# Patient Record
Sex: MALE | Race: WHITE | NOT HISPANIC OR LATINO | URBAN - METROPOLITAN AREA
[De-identification: names, ages, dates, MRNs, and addresses within clinical notes are randomized per-mention and may not be internally consistent; named-entity substitution may affect disease eponyms.]

---

## 2018-05-08 ENCOUNTER — OFFICE VISIT CONVERTED (OUTPATIENT)
Dept: SURGERY | Facility: CLINIC | Age: 58
End: 2018-05-08
Attending: UROLOGY

## 2018-09-19 ENCOUNTER — OFFICE VISIT CONVERTED (OUTPATIENT)
Dept: GASTROENTEROLOGY | Facility: CLINIC | Age: 58
End: 2018-09-19
Attending: INTERNAL MEDICINE

## 2019-02-14 ENCOUNTER — OFFICE VISIT CONVERTED (OUTPATIENT)
Dept: FAMILY MEDICINE CLINIC | Facility: CLINIC | Age: 59
End: 2019-02-14
Attending: FAMILY MEDICINE

## 2019-02-14 ENCOUNTER — CONVERSION ENCOUNTER (OUTPATIENT)
Dept: FAMILY MEDICINE CLINIC | Facility: CLINIC | Age: 59
End: 2019-02-14

## 2019-05-14 ENCOUNTER — OFFICE VISIT CONVERTED (OUTPATIENT)
Dept: FAMILY MEDICINE CLINIC | Facility: CLINIC | Age: 59
End: 2019-05-14
Attending: FAMILY MEDICINE

## 2019-11-14 ENCOUNTER — CONVERSION ENCOUNTER (OUTPATIENT)
Dept: FAMILY MEDICINE CLINIC | Facility: CLINIC | Age: 59
End: 2019-11-14

## 2019-11-14 ENCOUNTER — OFFICE VISIT CONVERTED (OUTPATIENT)
Dept: FAMILY MEDICINE CLINIC | Facility: CLINIC | Age: 59
End: 2019-11-14
Attending: FAMILY MEDICINE

## 2020-05-14 ENCOUNTER — TELEMEDICINE CONVERTED (OUTPATIENT)
Dept: FAMILY MEDICINE CLINIC | Facility: CLINIC | Age: 60
End: 2020-05-14
Attending: FAMILY MEDICINE

## 2020-06-15 ENCOUNTER — HOSPITAL ENCOUNTER (OUTPATIENT)
Dept: OTHER | Facility: HOSPITAL | Age: 60
Discharge: HOME | End: 2020-06-15
Attending: INTERNAL MEDICINE

## 2020-06-15 LAB
ANION GAP SERPL CALC-SCNC: 15 MMOL/L (ref 8–19)
BASOPHILS # BLD AUTO: 0.02 10*3/UL (ref 0–0.2)
BASOPHILS NFR BLD AUTO: 0.3 % (ref 0–3)
BUN SERPL-MCNC: 12 MG/DL (ref 5–25)
BUN/CREAT SERPL: 9 {RATIO} (ref 6–20)
CALCIUM SERPL-MCNC: 9.7 MG/DL (ref 8.7–10.4)
CHLORIDE SERPL-SCNC: 99 MMOL/L (ref 99–111)
CONV ABS IMM GRAN: 0.02 10*3/UL (ref 0–0.2)
CONV CO2: 29 MMOL/L (ref 22–32)
CONV IMMATURE GRAN: 0.3 % (ref 0–1.8)
CREAT UR-MCNC: 1.32 MG/DL (ref 0.7–1.2)
DEPRECATED RDW RBC AUTO: 42.1 FL (ref 35.1–43.9)
EOSINOPHIL # BLD AUTO: 0.22 10*3/UL (ref 0–0.7)
EOSINOPHIL # BLD AUTO: 3.7 % (ref 0–7)
ERYTHROCYTE [DISTWIDTH] IN BLOOD BY AUTOMATED COUNT: 12.8 % (ref 11.6–14.4)
GFR SERPLBLD BASED ON 1.73 SQ M-ARVRAT: 57 ML/MIN/{1.73_M2}
GLUCOSE SERPL-MCNC: 94 MG/DL (ref 70–99)
HCT VFR BLD AUTO: 42.3 % (ref 42–52)
HGB BLD-MCNC: 13.7 G/DL (ref 14–18)
INR PPP: 0.98 (ref 2–3)
LYMPHOCYTES # BLD AUTO: 2.62 10*3/UL (ref 1–5)
LYMPHOCYTES NFR BLD AUTO: 44.3 % (ref 20–45)
MCH RBC QN AUTO: 29.1 PG (ref 27–31)
MCHC RBC AUTO-ENTMCNC: 32.4 G/DL (ref 33–37)
MCV RBC AUTO: 89.8 FL (ref 80–96)
MONOCYTES # BLD AUTO: 0.5 10*3/UL (ref 0.2–1.2)
MONOCYTES NFR BLD AUTO: 8.5 % (ref 3–10)
NEUTROPHILS # BLD AUTO: 2.53 10*3/UL (ref 2–8)
NEUTROPHILS NFR BLD AUTO: 42.9 % (ref 30–85)
NRBC CBCN: 0 % (ref 0–0.7)
OSMOLALITY SERPL CALC.SUM OF ELEC: 290 MOSM/KG (ref 273–304)
PLATELET # BLD AUTO: 251 10*3/UL (ref 130–400)
PMV BLD AUTO: 9.3 FL (ref 9.4–12.4)
POTASSIUM SERPL-SCNC: 3.4 MMOL/L (ref 3.5–5.3)
PROTHROMBIN TIME: 10.6 S (ref 9.4–12)
RBC # BLD AUTO: 4.71 10*6/UL (ref 4.7–6.1)
SODIUM SERPL-SCNC: 140 MMOL/L (ref 135–147)
WBC # BLD AUTO: 5.91 10*3/UL (ref 4.8–10.8)

## 2020-06-16 LAB — SARS-COV-2 RNA SPEC QL NAA+PROBE: NOT DETECTED

## 2020-06-17 ENCOUNTER — HOSPITAL ENCOUNTER (OUTPATIENT)
Dept: INFUSION THERAPY | Facility: HOSPITAL | Age: 60
Discharge: HOME | End: 2020-06-17
Attending: INTERNAL MEDICINE

## 2020-11-12 ENCOUNTER — OFFICE VISIT CONVERTED (OUTPATIENT)
Dept: FAMILY MEDICINE CLINIC | Facility: CLINIC | Age: 60
End: 2020-11-12
Attending: FAMILY MEDICINE

## 2021-04-06 ENCOUNTER — OFFICE VISIT CONVERTED (OUTPATIENT)
Dept: FAMILY MEDICINE CLINIC | Facility: CLINIC | Age: 61
End: 2021-04-06
Attending: FAMILY MEDICINE

## 2021-05-13 NOTE — PROGRESS NOTES
Progress Note      Patient Name: Rico Armstrong   Patient ID: [id number]   Sex: Male   YOB: 1960    Primary Care Provider: Minh Shay DO   Referring Provider: Minh Shay DO    Visit Date: November 12, 2020    Provider: Minh Shay DO   Location: [facility]   Location Address: [address] [place] [address], [address]  [id number]   Location Phone: [phone]          Chief Complaint  · 6 month follow up       History Of Present Illness  Rico Armstrong is a 60 year old /White male who presents for evaluation and treatment of:      Patient presents today for 6-month follow-up.  Last time I saw him was back in May.  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  He will no longer be seeing Dr. Luz Marina Pineda as well as Dr. Jl Escobar.  He will continue to see Dr. Teresita Mak for Botox injections for muscle spasms on the left side of his neck.  His potassium level was previously checked and noted to be low.  I suspect it is due to hydrochlorothiazide.  He did take potassium supplementation, 10 mEq for 2 months.  He is no longer taking it now.  We will need to recheck his potassium as well as his lipid panel.  I will see him back in 6 months.       Past Medical History  Allergies; HLD (hyperlipidemia); HTN (hypertension); Hypothyroid; Need for hepatitis C screening test; CHERRY (obstructive sleep apnea); Prostate cancer screening; Reflux; Throat cancer; Tonsil cancer         Past Surgical History  Appendectomy; Cartilage graft, autogenous, to nose from rib; Colonoscopy; Feeding gastrostomy; Knee surgery; Port Placement; Skin Cancer Excision         Medication List  aspirin 81 mg oral tablet,chewable; cholecalciferol (vitamin D3) 1,000 unit oral capsule; esomeprazole magnesium 40 mg oral capsule,delayed release(DR/EC); fexofenadine 180 mg oral tablet; Fish Oil 1,000 mg (120 mg-180 mg) oral capsule;  "hydrochlorothiazide 25 mg oral tablet; Multivitamin 50 Plus oral tablet; potassium chloride 10 mEq oral tablet extended release; Prevident 5000 Enamel Protect 1.1-5 % dental paste; Synthroid 75 mcg oral tablet; Vitamin B-12 oral; Zocor 20 mg oral tablet         Allergy List  NO KNOWN DRUG ALLERGIES       Allergies Reconciled  Family Medical History  Stroke; Alzheimer's Disease; Heart Disease; Diabetes; No family history of colorectal cancer         Social History  Alcohol (Former); Tobacco (Former)         Immunizations  Name Date Admin   Influenza 11/14/2019         Review of Systems     Gen: Denies any fever, chills, or weight changes  HEENT: Denies any changes in vision or hearing, denies nasal congestion, denies any neck tenderness or lymphadenopathy  Extremities: Denies edema  Psychiatric: Denies any changes in mood or affect  Neurologic: Denies any deficits  Skin: Denies any rashes       Vitals  Date Time BP Position Site L\R Cuff Size HR RR TEMP (F) WT  HT  BMI kg/m2 BSA m2 O2 Sat FR L/min FiO2 HC       11/12/2020 08:54 /78 Sitting    82 - R  98.2 205lbs 4oz 5'  9\" 30.31 2.13 95 %            Physical Examination     General: AAO 3, no acute distress, pleasant  HEENT: Normocephalic, atraumatic  Cardiovascular: Regular rate and rhythm without appreciable murmur  Respiratory: Clear to auscultation bilaterally no RRW  Gastrointestinal: Soft nontender nondistended with bowel sounds present  extremities: No clubbing, cyanosis or edema  Neurologic: CN II through XII grossly intact   Psychiatric: Normal mood and affect           Assessment  · Hypertension     401.9/I10  · Medication monitoring encounter     V58.83/Z51.81  · Hypokalemia     276.8/E87.6  · Tonsillar cancer     146.0/C09.9  · Hypothyroid     244.9/E03.9  · HLD (hyperlipidemia)     272.4/E78.5  Patient overall doing well. Plan to repeat his labs. I will see him back in 6 months or sooner if needed. Patient will call with any questions or " concerns.      Plan  · Orders  o Lipid Panel [facility] ([address]) - 272.4/E78.5 - 11/12/2020  o ACO-39: Current medications updated and reviewed (, 1159F) - - 11/12/2020  o ACO-14: Influenza immunization administered or previously received [facility] () - - 11/12/2020  · Medications  o Medications have been Reconciled  o Transition of Care or Provider Policy  · Instructions  o Patient was educated/instructed on their diagnosis, treatment and medications prior to discharge from the clinic today.  o Patient instructed to seek medical attention urgently for new or worsening symptoms.  o Call the office with any concerns or questions.  · Disposition  o Follow Up in 6 months.            Electronically Signed by: Minh Shay DO - on November 12, 2020 09:29:32 AM

## 2021-05-13 NOTE — PROGRESS NOTES
Quick Note      Patient Name: Rico Armstrong   Patient ID: [id number]   Sex: Male   YOB: 1960    Primary Care Provider: Minh Shay DO    Visit Date: May 14, 2020    Provider: Minh Shay DO   Location: [facility]   Location Address: [address] [place] [address], [address] [place] [address]  [id number]   Location Phone: [phone]          History Of Present Illness  Video Conferencing Visit  Rico Armstrong is a 59 year old /White male who is presenting for evaluation via video conferencing. Verbal consent obtained before beginning visit.   The following staff were present during this visit: provider only      Patient presents for a video conference visit today.  This is done through the [employer] don[employer].  He is accompanied by his wife who is listening in on the conversation.  Patient provides verbal consent.  I spent 15 minutes on medical discussion with patient.  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  There is no need for any more follow-up.  He was seeing Dr. Luz Marina Pineda as well as Dr. Jl Escobar.  He will continue to see Dr. Teresita Mak for Botox injections for muscle spasms on the left side of his neck.  He reports feeling well.  He has not had recent labs done since I last saw him in November.  He has had some issues with hypokalemia however his last potassium check was within normal limits back in November.  I suspect the hypokalemia is caused by hydrochlorothiazide.  He is not requiring supplementation at this time.  Discussed having patient come in and  a copy of the lab orders.  He is requesting to get them done on [place].  He will bring in a copy of the labs for my review.  His LDL is elevated at 139.  He reports being more compliant with Zocor now.  He reports overall feeling well.  He did have scans recently done at [facility] for the [facility]  cancer center.  I will request these.  He is not requesting any medications to be refilled at this time.       Physical Examination     General appearance: AAO x3, pleasant, no acute distress  HEENT: Normocephalic, atraumatic  Respiratory: No dyspnea/respiratory distress           Assessment  · Muscle spasm     728.85/M62.838  · Tonsillar cancer     146.0/C09.9  · HLD (hyperlipidemia)     272.4/E78.5  · Hypertension     401.9/I10  · Hypokalemia     276.8/E87.6  · Hypothyroid     244.9/E03.9  · Medication monitoring encounter     V58.83/Z51.81    Problems Reconciled  Plan  · Orders  o CBC with Auto Diff [facility] ([address]) - 401.9/I10, V58.83/Z51.81 - 05/14/2020  o Thyroid Profile (TSH, FT4) (35366, 43508, THYII) - 401.9/I10, 244.9/E03.9, V58.83/Z51.81 - 05/14/2020  o CMP [facility] (51943) - 401.9/I10, V58.83/Z51.81 - 05/14/2020  o Lipid Panel [facility] ([id number]) - 272.4/E78.5 - 05/14/2020  · Instructions  o Patient overall doing well. Discussed getting routine labs and seeing him back in 6 months. I will request records from [facility] cancer center. Patient to call with any questions or concerns.   · Disposition  o Follow Up in 6 months.            Electronically Signed by: Minh Shay DO - on May 14, 2020 11:01:29 AM

## 2021-05-14 VITALS
HEIGHT: 69 IN | OXYGEN SATURATION: 95 % | DIASTOLIC BLOOD PRESSURE: 78 MMHG | HEART RATE: 82 BPM | WEIGHT: 205.25 LBS | TEMPERATURE: 98.2 F | SYSTOLIC BLOOD PRESSURE: 122 MMHG | BODY MASS INDEX: 30.4 KG/M2

## 2021-05-14 VITALS
OXYGEN SATURATION: 97 % | WEIGHT: 208.31 LBS | SYSTOLIC BLOOD PRESSURE: 158 MMHG | HEART RATE: 78 BPM | DIASTOLIC BLOOD PRESSURE: 88 MMHG | HEIGHT: 69 IN | TEMPERATURE: 97.9 F | BODY MASS INDEX: 30.85 KG/M2

## 2021-05-14 NOTE — PROGRESS NOTES
Progress Note      Patient Name: Rico Armstrong   Patient ID: [id number]   Sex: Male   YOB: 1960    Primary Care Provider: Minh Shay DO   Referring Provider: Minh Shay DO    Visit Date: April 6, 2021    Provider: Minh Shay DO   Location: [facility]   Location Address: [address] [place] [address], [address]  [place], [place]  [id number]   Location Phone: [phone]          Chief Complaint  · requesting a letter      History Of Present Illness  Rico Armstrong is a 60 year old /White male who presents for evaluation and treatment of:      Presents today requesting exemption from jury duty.  He has had tonsillar cancer.  This was in 2015.  He is status post chemo and radiation.  Unfortunately due to this condition he has been left with issues regarding dry mouth and dry throat.  He does drink liquids all the time.  He will not be allowed to do this likely while on the jury stand.  He is requesting a letter today for exemption which I find to be reasonable.  He will also have frequent bathroom breaks which he does urinate frequently due to all the liquids he takes and throughout the day.       Past Medical History  Allergies; HLD (hyperlipidemia); HTN (hypertension); Hypothyroid; Need for hepatitis C screening test; CHERRY (obstructive sleep apnea); Prostate cancer screening; Reflux; Throat cancer; Tonsil cancer         Past Surgical History  Appendectomy; Cartilage graft, autogenous, to nose from rib; Colonoscopy; Feeding gastrostomy; Knee surgery; Port Placement; Skin Cancer Excision         Medication List  aspirin 81 mg oral tablet,chewable; cholecalciferol (vitamin D3) 1,000 unit oral capsule; esomeprazole magnesium 40 mg oral capsule,delayed release(DR/EC); fexofenadine 180 mg oral tablet; Fish Oil 1,000 mg (120 mg-180 mg) oral capsule; hydrochlorothiazide 25 mg oral tablet; Multivitamin 50 Plus oral tablet; potassium chloride 10 mEq oral tablet extended release;  "Prevident 5000 Enamel Protect 1.1-5 % dental paste; Synthroid 75 mcg oral tablet; Vitamin B-12 oral; Zocor 20 mg oral tablet         Allergy List  NO KNOWN DRUG ALLERGIES       Allergies Reconciled  Family Medical History  Stroke; Alzheimer's Disease; Heart Disease; Diabetes; No family history of colorectal cancer         Social History  Alcohol (Former); Tobacco (Former)         Immunizations  Name Date Admin   COVID Moderna 04/05/2021   COVID Moderna 03/08/2021   Influenza 09/07/2020   Influenza 11/14/2019         Review of Systems     Gen: Denies any fever, chills  HEENT: As discussed above       Vitals  Date Time BP Position Site L\R Cuff Size HR RR TEMP (F) WT  HT  BMI kg/m2 BSA m2 O2 Sat FR L/min FiO2 HC       04/06/2021 01:07 /88 Sitting    78 - R  97.9 208lbs 5oz 5'  9\" 30.76 2.14 97 %            Physical Examination     General: AAO 3, no acute distress, pleasant  HEENT: Normocephalic, atraumatic             Assessment  · Tonsillar cancer     146.0/C09.9      Plan  · Orders  o ACO-14: Influenza immunization administered or previously received [facility] () - - 04/06/2021  o ACO-39: Current medications updated and reviewed (1159F, ) - - 04/06/2021  · Instructions  o Patient was educated/instructed on their diagnosis, treatment and medications prior to discharge from the clinic today.  o Patient instructed to seek medical attention urgently for new or worsening symptoms.  o Call the office with any concerns or questions.  o Plan as documented above. Patient instructed to call with any questions or concerns. Plan on seeing him back for his next regular scheduled appointment or sooner if needed.  · Disposition  o Keep next appointment            Electronically Signed by: Minh Shay DO -Author on April 6, 2021 01:25:23 PM  "

## 2021-05-15 VITALS
DIASTOLIC BLOOD PRESSURE: 74 MMHG | BODY MASS INDEX: 31.03 KG/M2 | HEIGHT: 69 IN | SYSTOLIC BLOOD PRESSURE: 124 MMHG | HEART RATE: 77 BPM | TEMPERATURE: 98.2 F | OXYGEN SATURATION: 96 % | WEIGHT: 209.5 LBS

## 2021-05-15 VITALS
HEART RATE: 70 BPM | BODY MASS INDEX: 29.67 KG/M2 | HEIGHT: 69 IN | TEMPERATURE: 98.2 F | SYSTOLIC BLOOD PRESSURE: 140 MMHG | OXYGEN SATURATION: 96 % | DIASTOLIC BLOOD PRESSURE: 76 MMHG | WEIGHT: 200.31 LBS

## 2021-05-15 VITALS
OXYGEN SATURATION: 96 % | BODY MASS INDEX: 29.33 KG/M2 | WEIGHT: 198 LBS | DIASTOLIC BLOOD PRESSURE: 70 MMHG | SYSTOLIC BLOOD PRESSURE: 130 MMHG | HEART RATE: 69 BPM | TEMPERATURE: 97.8 F | HEIGHT: 69 IN

## 2021-05-16 VITALS — BODY MASS INDEX: 29.62 KG/M2 | RESPIRATION RATE: 16 BRPM | HEIGHT: 69 IN | WEIGHT: 200 LBS

## 2021-05-16 VITALS
DIASTOLIC BLOOD PRESSURE: 83 MMHG | WEIGHT: 200 LBS | HEIGHT: 69 IN | SYSTOLIC BLOOD PRESSURE: 154 MMHG | BODY MASS INDEX: 29.62 KG/M2

## 2021-06-22 ENCOUNTER — TELEPHONE (OUTPATIENT)
Dept: FAMILY MEDICINE CLINIC | Facility: CLINIC | Age: 61
End: 2021-06-22

## 2021-06-22 RX ORDER — FLUTICASONE PROPIONATE 50 MCG
2 SPRAY, SUSPENSION (ML) NASAL DAILY
Qty: 16 G | Refills: 3 | Status: SHIPPED | OUTPATIENT
Start: 2021-06-22

## 2021-06-22 NOTE — TELEPHONE ENCOUNTER
Please contact patient.  I am not sure what the request is here is that states new medication but I do not see medication listed.  Please inform patient that prescription has been sent to [facility].  If he continues to have issues please have him make an appointment to be seen.

## 2021-06-22 NOTE — TELEPHONE ENCOUNTER
Caller: Rico Armstrong    Relationship: Self    Best call back number:[phone]    What medication are you requesting: FLONASE IS HAVING BAD ALLERGIES THIS YEAR         If a prescription is needed, what is your preferred pharmacy and phone number:  [facility]

## 2021-08-17 ENCOUNTER — OFFICE VISIT (OUTPATIENT)
Dept: FAMILY MEDICINE CLINIC | Facility: CLINIC | Age: 61
End: 2021-08-17

## 2021-08-17 VITALS
WEIGHT: 198 LBS | SYSTOLIC BLOOD PRESSURE: 126 MMHG | RESPIRATION RATE: 18 BRPM | TEMPERATURE: 98 F | HEART RATE: 63 BPM | BODY MASS INDEX: 29.33 KG/M2 | HEIGHT: 69 IN | DIASTOLIC BLOOD PRESSURE: 76 MMHG | OXYGEN SATURATION: 97 %

## 2021-08-17 DIAGNOSIS — Z85.828 PERSONAL HISTORY OF SKIN CANCER: ICD-10-CM

## 2021-08-17 DIAGNOSIS — Z12.5 SCREENING FOR PROSTATE CANCER: ICD-10-CM

## 2021-08-17 DIAGNOSIS — K21.9 GASTROESOPHAGEAL REFLUX DISEASE, UNSPECIFIED WHETHER ESOPHAGITIS PRESENT: ICD-10-CM

## 2021-08-17 DIAGNOSIS — Z51.81 MEDICATION MONITORING ENCOUNTER: ICD-10-CM

## 2021-08-17 DIAGNOSIS — I10 ESSENTIAL HYPERTENSION: Primary | ICD-10-CM

## 2021-08-17 DIAGNOSIS — C44.90 SKIN CANCER: ICD-10-CM

## 2021-08-17 DIAGNOSIS — C09.9 TONSILLAR CANCER (HCC): ICD-10-CM

## 2021-08-17 DIAGNOSIS — E03.9 HYPOTHYROIDISM, UNSPECIFIED TYPE: ICD-10-CM

## 2021-08-17 DIAGNOSIS — E55.9 VITAMIN D DEFICIENCY: ICD-10-CM

## 2021-08-17 DIAGNOSIS — M62.838 MUSCLE SPASMS OF NECK: ICD-10-CM

## 2021-08-17 PROBLEM — I47.1 PAROXYSMAL SUPRAVENTRICULAR TACHYCARDIA: Status: ACTIVE | Noted: 2018-10-05

## 2021-08-17 PROBLEM — I47.10 PAROXYSMAL SUPRAVENTRICULAR TACHYCARDIA: Status: ACTIVE | Noted: 2018-10-05

## 2021-08-17 PROBLEM — Z12.11 COLON CANCER SCREENING: Status: ACTIVE | Noted: 2021-08-17

## 2021-08-17 PROBLEM — J01.80 OTHER ACUTE SINUSITIS: Status: ACTIVE | Noted: 2021-08-17

## 2021-08-17 PROBLEM — C76.0 MALIGNANT NEOPLASM OF EAR, NOSE, AND THROAT: Status: ACTIVE | Noted: 2021-08-17

## 2021-08-17 PROBLEM — C44.201 MALIGNANT NEOPLASM OF EAR, NOSE, AND THROAT: Status: ACTIVE | Noted: 2021-08-17

## 2021-08-17 PROBLEM — R00.2 PALPITATIONS: Status: ACTIVE | Noted: 2018-10-05

## 2021-08-17 PROBLEM — Z11.59 NEED FOR HEPATITIS C SCREENING TEST: Status: ACTIVE | Noted: 2021-08-17

## 2021-08-17 PROBLEM — E78.5 HLD (HYPERLIPIDEMIA): Status: ACTIVE | Noted: 2021-08-17

## 2021-08-17 PROBLEM — G47.33 OSA (OBSTRUCTIVE SLEEP APNEA): Status: ACTIVE | Noted: 2019-02-14

## 2021-08-17 PROBLEM — C14.0 MALIGNANT NEOPLASM OF EAR, NOSE, AND THROAT: Status: ACTIVE | Noted: 2021-08-17

## 2021-08-17 PROBLEM — R00.0 TACHYCARDIA: Status: ACTIVE | Noted: 2018-10-05

## 2021-08-17 PROCEDURE — 99214 OFFICE O/P EST MOD 30 MIN: CPT | Performed by: FAMILY MEDICINE

## 2021-08-17 RX ORDER — FEXOFENADINE HCL 180 MG/1
TABLET ORAL
COMMUNITY
End: 2021-08-17

## 2021-08-17 RX ORDER — SIMVASTATIN 40 MG
TABLET ORAL
COMMUNITY
Start: 2021-07-03

## 2021-08-17 RX ORDER — SIMVASTATIN 20 MG
TABLET ORAL
COMMUNITY
End: 2021-08-17

## 2021-08-17 RX ORDER — ASPIRIN 81 MG/1
TABLET, COATED ORAL
COMMUNITY
Start: 2021-07-26

## 2021-08-17 RX ORDER — SIMVASTATIN 10 MG
TABLET ORAL
COMMUNITY
End: 2021-08-17

## 2021-08-17 RX ORDER — ERGOCALCIFEROL 1.25 MG/1
CAPSULE ORAL
COMMUNITY
End: 2021-08-17

## 2021-08-17 RX ORDER — LEVOTHYROXINE SODIUM 0.07 MG/1
TABLET ORAL
COMMUNITY

## 2021-08-17 RX ORDER — ESOMEPRAZOLE MAGNESIUM 40 MG/1
CAPSULE, DELAYED RELEASE ORAL
COMMUNITY

## 2021-08-17 RX ORDER — HYDROCHLOROTHIAZIDE 25 MG/1
TABLET ORAL
COMMUNITY
Start: 2021-07-26

## 2021-08-17 RX ORDER — CYCLOBENZAPRINE HCL 10 MG
TABLET ORAL
COMMUNITY
End: 2021-08-17

## 2021-08-17 RX ORDER — CETIRIZINE HYDROCHLORIDE 10 MG/1
TABLET ORAL
COMMUNITY
Start: 2021-07-03

## 2021-08-17 RX ORDER — HYDROCHLOROTHIAZIDE 12.5 MG/1
TABLET ORAL
COMMUNITY
End: 2021-08-17

## 2021-08-17 NOTE — PROGRESS NOTES
"Chief Complaint  Hypertension  Needs dermatology referral for history of skin cancer    Subjective          Rico Armstrong presents to [facility]  History of Present Illness  Patient presents today to follow-up for hypertension.  Blood pressures under good control today.  He is taking hydrochlorothiazide 25 mg.  He has previously had some issues with hypokalemia.  Most recent lab work showed resolution of hypokalemia.  Discussed continue current management at this time getting labs updated when he returns for follow-up.  He does have hyperlipidemia and he is taking simvastatin/Zocor.  He does have acid reflux and takes Nexium.  He reports that acid reflux has been under good control.  He does need a referral to dermatology for history of skin cancer.  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  He needs a renewal of this referral.  He is taking 75 mcg of levothyroxine.  He is still getting Botox injections with Dr. Teresita Mak for neck pain/spasms.  He is not requesting a renewal of referral at this time.  Objective   Vital Signs:   /76   Pulse 63   Temp 98 °F (36.7 °C)   Resp 18   Ht 175.3 cm (69\")   Wt 89.8 kg (198 lb)   SpO2 97%   BMI 29.24 kg/m²     Physical Exam   General: AAO ×3, no acute distress, pleasant  HEENT: Normocephalic, atraumatic  Cardiovascular: Regular rate and rhythm without appreciable murmur  Respiratory: Clear to auscultation bilaterally no RRW  Gastrointestinal: Soft nontender nondistended with bowel sounds present  extremities: No edema  Neurologic: CN II through XII grossly intact   Psychiatric: Normal mood and affect  Result Review :                 Assessment and Plan    Diagnoses and all orders for this visit:    1. Essential hypertension (Primary)  -     CBC & Differential; Future  -     Comprehensive Metabolic Panel; Future    2. Gastroesophageal reflux disease, unspecified whether esophagitis " present    3. Tonsillar cancer (CMS/HCC)    4. Muscle spasms of neck    5. Skin cancer  -     Ambulatory Referral to Dermatology    6. Personal history of skin cancer  -     Ambulatory Referral to Dermatology    7. Medication monitoring encounter  -     PSA Screen; Future  -     CBC & Differential; Future  -     Comprehensive Metabolic Panel; Future  -     Lipid Panel; Future  -     TSH+Free T4; Future  -     Vitamin D 25 Hydroxy; Future    8. Screening for prostate cancer  -     PSA Screen; Future    9. Hypothyroidism, unspecified type  -     TSH+Free T4; Future    10. Vitamin D deficiency  -     Vitamin D 25 Hydroxy; Future    Plan as documented above.  Discussed getting labs repeated when patient returns for follow-up in 6 months.  He is instructed to call with any questions or concerns.  Discussed continue current medical management at this time.  Referral has been made.    Follow Up   Return in about 6 months (around 2/17/2022) for Hypertension.  Patient was given instructions and counseling regarding his condition or for health maintenance advice. Please see specific information pulled into the AVS if appropriate.

## 2021-09-01 ENCOUNTER — TELEPHONE (OUTPATIENT)
Dept: FAMILY MEDICINE CLINIC | Facility: CLINIC | Age: 61
End: 2021-09-01

## 2021-09-01 DIAGNOSIS — Z85.89: ICD-10-CM

## 2021-09-01 DIAGNOSIS — Z85.818 HISTORY OF CANCER TONSIL: Primary | ICD-10-CM

## 2021-09-01 NOTE — TELEPHONE ENCOUNTER
Caller: Rico Armstrong    Relationship: Self    Best call back number:  [phone]    What is the medical concern/diagnosis:  PATIENT HAD HISTORY OF TONSIL AND LYMPH NODE CANCER     What specialty or service is being requested: VASCULAR SURGEON     What is the provider, practice or medical service name: DR. JOSSY CARCAMO    What is the office location: [place] [address]     What is the office phone number: [phone]    Any additional details:RECURRING REFERRING PER PATIENT       APPOINTMENT IS ON THE September 09 AND HE NEEDS IT BEFORE THEN.

## 2021-11-01 ENCOUNTER — OFFICE VISIT (OUTPATIENT)
Dept: FAMILY MEDICINE CLINIC | Facility: CLINIC | Age: 61
End: 2021-11-01

## 2021-11-01 VITALS
SYSTOLIC BLOOD PRESSURE: 152 MMHG | HEART RATE: 74 BPM | WEIGHT: 209.4 LBS | TEMPERATURE: 97.7 F | HEIGHT: 69 IN | OXYGEN SATURATION: 97 % | BODY MASS INDEX: 31.01 KG/M2 | DIASTOLIC BLOOD PRESSURE: 88 MMHG

## 2021-11-01 DIAGNOSIS — M79.674 GREAT TOE PAIN, RIGHT: Primary | ICD-10-CM

## 2021-11-01 LAB
BASOPHILS # BLD AUTO: 0.07 10*3/MM3 (ref 0–0.2)
BASOPHILS NFR BLD AUTO: 0.8 % (ref 0–1.5)
CRP SERPL-MCNC: <0.3 MG/DL (ref 0–0.5)
DEPRECATED RDW RBC AUTO: 43.8 FL (ref 37–54)
EOSINOPHIL # BLD AUTO: 0.41 10*3/MM3 (ref 0–0.4)
EOSINOPHIL NFR BLD AUTO: 4.9 % (ref 0.3–6.2)
ERYTHROCYTE [DISTWIDTH] IN BLOOD BY AUTOMATED COUNT: 13.1 % (ref 12.3–15.4)
HCT VFR BLD AUTO: 46.7 % (ref 37.5–51)
HGB BLD-MCNC: 15.2 G/DL (ref 13–17.7)
IMM GRANULOCYTES # BLD AUTO: 0.02 10*3/MM3 (ref 0–0.05)
IMM GRANULOCYTES NFR BLD AUTO: 0.2 % (ref 0–0.5)
LYMPHOCYTES # BLD AUTO: 3.43 10*3/MM3 (ref 0.7–3.1)
LYMPHOCYTES NFR BLD AUTO: 41 % (ref 19.6–45.3)
MCH RBC QN AUTO: 29.3 PG (ref 26.6–33)
MCHC RBC AUTO-ENTMCNC: 32.5 G/DL (ref 31.5–35.7)
MCV RBC AUTO: 90.2 FL (ref 79–97)
MONOCYTES # BLD AUTO: 0.68 10*3/MM3 (ref 0.1–0.9)
MONOCYTES NFR BLD AUTO: 8.1 % (ref 5–12)
NEUTROPHILS NFR BLD AUTO: 3.75 10*3/MM3 (ref 1.7–7)
NEUTROPHILS NFR BLD AUTO: 45 % (ref 42.7–76)
NRBC BLD AUTO-RTO: 0 /100 WBC (ref 0–0.2)
PLATELET # BLD AUTO: 310 10*3/MM3 (ref 140–450)
PMV BLD AUTO: 10 FL (ref 6–12)
RBC # BLD AUTO: 5.18 10*6/MM3 (ref 4.14–5.8)
URATE SERPL-MCNC: 7.4 MG/DL (ref 3.4–7)
WBC # BLD AUTO: 8.36 10*3/MM3 (ref 3.4–10.8)

## 2021-11-01 PROCEDURE — 90686 IIV4 VACC NO PRSV 0.5 ML IM: CPT | Performed by: NURSE PRACTITIONER

## 2021-11-01 PROCEDURE — 86140 C-REACTIVE PROTEIN: CPT | Performed by: NURSE PRACTITIONER

## 2021-11-01 PROCEDURE — 85025 COMPLETE CBC W/AUTO DIFF WBC: CPT | Performed by: NURSE PRACTITIONER

## 2021-11-01 PROCEDURE — 84550 ASSAY OF BLOOD/URIC ACID: CPT | Performed by: NURSE PRACTITIONER

## 2021-11-01 PROCEDURE — 99213 OFFICE O/P EST LOW 20 MIN: CPT | Performed by: NURSE PRACTITIONER

## 2021-11-01 PROCEDURE — 36415 COLL VENOUS BLD VENIPUNCTURE: CPT | Performed by: NURSE PRACTITIONER

## 2021-11-01 PROCEDURE — 90471 IMMUNIZATION ADMIN: CPT | Performed by: NURSE PRACTITIONER

## 2021-11-01 RX ORDER — DIPHENOXYLATE HYDROCHLORIDE AND ATROPINE SULFATE 2.5; .025 MG/1; MG/1
TABLET ORAL DAILY
COMMUNITY

## 2021-11-01 RX ORDER — CHLORAL HYDRATE 500 MG
CAPSULE ORAL DAILY
COMMUNITY

## 2021-11-01 RX ORDER — MELATONIN
COMMUNITY
Start: 2021-10-26

## 2021-11-01 NOTE — PATIENT INSTRUCTIONS
Gout    Gout is painful swelling of your joints. Gout is a type of arthritis. It is caused by having too much uric acid in your body. Uric acid is a chemical that is made when your body breaks down substances called purines. If your body has too much uric acid, sharp crystals can form and build up in your joints. This causes pain and swelling.  Gout attacks can happen quickly and be very painful (acute gout). Over time, the attacks can affect more joints and happen more often (chronic gout).  What are the causes?  · Too much uric acid in your blood. This can happen because:  ? Your kidneys do not remove enough uric acid from your blood.  ? Your body makes too much uric acid.  ? You eat too many foods that are high in purines. These foods include organ meats, some seafood, and beer.  · Trauma or stress.  What increases the risk?  · Having a family history of gout.  · Being male and middle-aged.  · Being female and having gone through menopause.  · Being very overweight (obese).  · Drinking alcohol, especially beer.  · Not having enough water in the body (being dehydrated).  · Losing weight too quickly.  · Having an organ transplant.  · Having lead poisoning.  · Taking certain medicines.  · Having kidney disease.  · Having a skin condition called psoriasis.  What are the signs or symptoms?  An attack of acute gout usually happens in just one joint. The most common place is the big toe. Attacks often start at night. Other joints that may be affected include joints of the feet, ankle, knee, fingers, wrist, or elbow. Symptoms of an attack may include:  · Very bad pain.  · Warmth.  · Swelling.  · Stiffness.  · Shiny, red, or purple skin.  · Tenderness. The affected joint may be very painful to touch.  · Chills and fever.  Chronic gout may cause symptoms more often. More joints may be involved. You may also have white or yellow lumps (tophi) on your hands or feet or in other areas near your joints.  How is this  treated?  · Treatment for this condition has two phases: treating an acute attack and preventing future attacks.  · Acute gout treatment may include:  ? NSAIDs.  ? Steroids. These are taken by mouth or injected into a joint.  ? Colchicine. This medicine relieves pain and swelling. It can be given by mouth or through an IV tube.  · Preventive treatment may include:  ? Taking small doses of NSAIDs or colchicine daily.  ? Using a medicine that reduces uric acid levels in your blood.  ? Making changes to your diet. You may need to see a food expert (dietitian) about what to eat and drink to prevent gout.  Follow these instructions at home:  During a gout attack    · If told, put ice on the painful area:  ? Put ice in a plastic bag.  ? Place a towel between your skin and the bag.  ? Leave the ice on for 20 minutes, 2-3 times a day.  · Raise (elevate) the painful joint above the level of your heart as often as you can.  · Rest the joint as much as possible. If the joint is in your leg, you may be given crutches.  · Follow instructions from your doctor about what you cannot eat or drink.    Avoiding future gout attacks  · Eat a low-purine diet. Avoid foods and drinks such as:  ? Liver.  ? Kidney.  ? Anchovies.  ? Asparagus.  ? Herring.  ? Mushrooms.  ? Mussels.  ? Beer.  · Stay at a healthy weight. If you want to lose weight, talk with your doctor. Do not lose weight too fast.  · Start or continue an exercise plan as told by your doctor.  Eating and drinking  · Drink enough fluids to keep your pee (urine) pale yellow.  · If you drink alcohol:  ? Limit how much you use to:  § 0-1 drink a day for women.  § 0-2 drinks a day for men.  ? Be aware of how much alcohol is in your drink. In the [place] one drink equals one 12 oz bottle of beer (355 mL), one 5 oz glass of wine (148 mL), or one 1½ oz glass of hard liquor (44 mL).  General instructions  · Take over-the-counter and prescription medicines only as told by your  doctor.  · Do not drive or use heavy machinery while taking prescription pain medicine.  · Return to your normal activities as told by your doctor. Ask your doctor what activities are safe for you.  · Keep all follow-up visits as told by your doctor. This is important.  Contact a doctor if:  · You have another gout attack.  · You still have symptoms of a gout attack after 10 days of treatment.  · You have problems (side effects) because of your medicines.  · You have chills or a fever.  · You have burning pain when you pee (urinate).  · You have pain in your lower back or belly.  Get help right away if:  · You have very bad pain.  · Your pain cannot be controlled.  · You cannot pee.  Summary  · Gout is painful swelling of the joints.  · The most common site of pain is the big toe, but it can affect other joints.  · Medicines and avoiding some foods can help to prevent and treat gout attacks.  This information is not intended to replace advice given to you by your health care provider. Make sure you discuss any questions you have with your health care provider.  Document Revised: 07/10/2019 Document Reviewed: 07/10/2019  [employer] Patient Education © 2021 [employer]    Gout    Gout is a condition that causes painful swelling of the joints. Gout is a type of inflammation of the joints (arthritis). This condition is caused by having too much uric acid in the body. Uric acid is a chemical that forms when the body breaks down substances called purines. Purines are important for building body proteins.  When the body has too much uric acid, sharp crystals can form and build up inside the joints. This causes pain and swelling. Gout attacks can happen quickly and may be very painful (acute gout). Over time, the attacks can affect more joints and become more frequent (chronic gout). Gout can also cause uric acid to build up under the skin and inside the kidneys.  What are the causes?  This condition is caused by too much  uric acid in your blood. This can happen because:  · Your kidneys do not remove enough uric acid from your blood. This is the most common cause.  · Your body makes too much uric acid. This can happen with some cancers and cancer treatments. It can also occur if your body is breaking down too many red blood cells (hemolytic anemia).  · You eat too many foods that are high in purines. These foods include organ meats and some seafood. Alcohol, especially beer, is also high in purines.  A gout attack may be triggered by trauma or stress.  What increases the risk?  You are more likely to develop this condition if you:  · Have a family history of gout.  · Are male and middle-aged.  · Are female and have gone through menopause.  · Are obese.  · Frequently drink alcohol, especially beer.  · Are dehydrated.  · Lose weight too quickly.  · Have an organ transplant.  · Have lead poisoning.  · Take certain medicines, including aspirin, cyclosporine, diuretics, levodopa, and niacin.  · Have kidney disease.  · Have a skin condition called psoriasis.  What are the signs or symptoms?  An attack of acute gout happens quickly. It usually occurs in just one joint. The most common place is the big toe. Attacks often start at night. Other joints that may be affected include joints of the feet, ankle, knee, fingers, wrist, or elbow. Symptoms of this condition may include:  · Severe pain.  · Warmth.  · Swelling.  · Stiffness.  · Tenderness. The affected joint may be very painful to touch.  · Shiny, red, or purple skin.  · Chills and fever.  Chronic gout may cause symptoms more frequently. More joints may be involved. You may also have white or yellow lumps (tophi) on your hands or feet or in other areas near your joints.  How is this diagnosed?  This condition is diagnosed based on your symptoms, medical history, and physical exam. You may have tests, such as:  · Blood tests to measure uric acid levels.  · Removal of joint fluid with a  thin needle (aspiration) to look for uric acid crystals.  · X-rays to look for joint damage.  How is this treated?  Treatment for this condition has two phases: treating an acute attack and preventing future attacks. Acute gout treatment may include medicines to reduce pain and swelling, including:  · NSAIDs.  · Steroids. These are strong anti-inflammatory medicines that can be taken by mouth (orally) or injected into a joint.  · Colchicine. This medicine relieves pain and swelling when it is taken soon after an attack. It can be given by mouth or through an IV.  Preventive treatment may include:  · Daily use of smaller doses of NSAIDs or colchicine.  · Use of a medicine that reduces uric acid levels in your blood.  · Changes to your diet. You may need to see a dietitian about what to eat and drink to prevent gout.  Follow these instructions at home:  During a gout attack    · If directed, put ice on the affected area:  ? Put ice in a plastic bag.  ? Place a towel between your skin and the bag.  ? Leave the ice on for 20 minutes, 2-3 times a day.  · Raise (elevate) the affected joint above the level of your heart as often as possible.  · Rest the joint as much as possible. If the affected joint is in your leg, you may be given crutches to use.  · Follow instructions from your health care provider about eating or drinking restrictions.    Avoiding future gout attacks  · Follow a low-purine diet as told by your dietitian or health care provider. Avoid foods and drinks that are high in purines, including liver, kidney, anchovies, asparagus, herring, mushrooms, mussels, and beer.  · Maintain a healthy weight or lose weight if you are overweight. If you want to lose weight, talk with your health care provider. It is important that you do not lose weight too quickly.  · Start or maintain an exercise program as told by your health care provider.  Eating and drinking  · Drink enough fluids to keep your urine pale  yellow.  · If you drink alcohol:  ? Limit how much you use to:  § 0-1 drink a day for women.  § 0-2 drinks a day for men.  ? Be aware of how much alcohol is in your drink. In the [place] one drink equals one 12 oz bottle of beer (355 mL) one 5 oz glass of wine (148 mL), or one 1½ oz glass of hard liquor (44 mL).  General instructions  · Take over-the-counter and prescription medicines only as told by your health care provider.  · Do not drive or use heavy machinery while taking prescription pain medicine.  · Return to your normal activities as told by your health care provider. Ask your health care provider what activities are safe for you.  · Keep all follow-up visits as told by your health care provider. This is important.  Contact a health care provider if you have:  · Another gout attack.  · Continuing symptoms of a gout attack after 10 days of treatment.  · Side effects from your medicines.  · Chills or a fever.  · Burning pain when you urinate.  · Pain in your lower back or belly.  Get help right away if you:  · Have severe or uncontrolled pain.  · Cannot urinate.  Summary  · Gout is painful swelling of the joints caused by inflammation.  · The most common site of pain is the big toe, but it can affect other joints in the body.  · Medicines and dietary changes can help to prevent and treat gout attacks.  This information is not intended to replace advice given to you by your health care provider. Make sure you discuss any questions you have with your health care provider.  Document Revised: 07/10/2019 Document Reviewed: 07/10/2019  [employer] Patient Education © 2021 [employer]    Gout    Gout is painful swelling of your joints. Gout is a type of arthritis. It is caused by having too much uric acid in your body. Uric acid is a chemical that is made when your body breaks down substances called purines. If your body has too much uric acid, sharp crystals can form and build up in your joints. This causes pain and  swelling.  Gout attacks can happen quickly and be very painful (acute gout). Over time, the attacks can affect more joints and happen more often (chronic gout).  What are the causes?  · Too much uric acid in your blood. This can happen because:  ? Your kidneys do not remove enough uric acid from your blood.  ? Your body makes too much uric acid.  ? You eat too many foods that are high in purines. These foods include organ meats, some seafood, and beer.  · Trauma or stress.  What increases the risk?  · Having a family history of gout.  · Being male and middle-aged.  · Being female and having gone through menopause.  · Being very overweight (obese).  · Drinking alcohol, especially beer.  · Not having enough water in the body (being dehydrated).  · Losing weight too quickly.  · Having an organ transplant.  · Having lead poisoning.  · Taking certain medicines.  · Having kidney disease.  · Having a skin condition called psoriasis.  What are the signs or symptoms?  An attack of acute gout usually happens in just one joint. The most common place is the big toe. Attacks often start at night. Other joints that may be affected include joints of the feet, ankle, knee, fingers, wrist, or elbow. Symptoms of an attack may include:  · Very bad pain.  · Warmth.  · Swelling.  · Stiffness.  · Shiny, red, or purple skin.  · Tenderness. The affected joint may be very painful to touch.  · Chills and fever.  Chronic gout may cause symptoms more often. More joints may be involved. You may also have white or yellow lumps (tophi) on your hands or feet or in other areas near your joints.  How is this treated?  · Treatment for this condition has two phases: treating an acute attack and preventing future attacks.  · Acute gout treatment may include:  ? NSAIDs.  ? Steroids. These are taken by mouth or injected into a joint.  ? Colchicine. This medicine relieves pain and swelling. It can be given by mouth or through an IV tube.  · Preventive  treatment may include:  ? Taking small doses of NSAIDs or colchicine daily.  ? Using a medicine that reduces uric acid levels in your blood.  ? Making changes to your diet. You may need to see a food expert (dietitian) about what to eat and drink to prevent gout.  Follow these instructions at home:  During a gout attack    · If told, put ice on the painful area:  ? Put ice in a plastic bag.  ? Place a towel between your skin and the bag.  ? Leave the ice on for 20 minutes, 2-3 times a day.  · Raise (elevate) the painful joint above the level of your heart as often as you can.  · Rest the joint as much as possible. If the joint is in your leg, you may be given crutches.  · Follow instructions from your doctor about what you cannot eat or drink.    Avoiding future gout attacks  · Eat a low-purine diet. Avoid foods and drinks such as:  ? Liver.  ? Kidney.  ? Anchovies.  ? Asparagus.  ? Herring.  ? Mushrooms.  ? Mussels.  ? Beer.  · Stay at a healthy weight. If you want to lose weight, talk with your doctor. Do not lose weight too fast.  · Start or continue an exercise plan as told by your doctor.  Eating and drinking  · Drink enough fluids to keep your pee (urine) pale yellow.  · If you drink alcohol:  ? Limit how much you use to:  § 0-1 drink a day for women.  § 0-2 drinks a day for men.  ? Be aware of how much alcohol is in your drink. In the [place] one drink equals one 12 oz bottle of beer (355 mL), one 5 oz glass of wine (148 mL), or one 1½ oz glass of hard liquor (44 mL).  General instructions  · Take over-the-counter and prescription medicines only as told by your doctor.  · Do not drive or use heavy machinery while taking prescription pain medicine.  · Return to your normal activities as told by your doctor. Ask your doctor what activities are safe for you.  · Keep all follow-up visits as told by your doctor. This is important.  Contact a doctor if:  · You have another gout attack.  · You still have symptoms of a  gout attack after 10 days of treatment.  · You have problems (side effects) because of your medicines.  · You have chills or a fever.  · You have burning pain when you pee (urinate).  · You have pain in your lower back or belly.  Get help right away if:  · You have very bad pain.  · Your pain cannot be controlled.  · You cannot pee.  Summary  · Gout is painful swelling of the joints.  · The most common site of pain is the big toe, but it can affect other joints.  · Medicines and avoiding some foods can help to prevent and treat gout attacks.  This information is not intended to replace advice given to you by your health care provider. Make sure you discuss any questions you have with your health care provider.  Document Revised: 07/10/2019 Document Reviewed: 07/10/2019  [employer] Patient Education © 2021 [employer]

## 2021-11-01 NOTE — PROGRESS NOTES
"Chief Complaint  gout flare    Subjective          Rico Armstrong presents to [facility]  History of Present Illness  He is here for an acute visit, he is a patient of Dr. Shay.  He is complaining of a possible gout flareup.  He states that last week his right great toe joint was hurting and swollen.  He states he took over-the-counter anti-inflammatories and the pain has subsided now.  He thinks he may have had a gout flare but he has never had this before.  He denies any pain now.  Objective   Vital Signs:   /88   Pulse 74   Temp 97.7 °F (36.5 °C)   Ht 175.3 cm (69\")   Wt 95 kg (209 lb 6.4 oz)   SpO2 97%   BMI 30.92 kg/m²     Physical Exam  Vitals reviewed.   Constitutional:       Appearance: Normal appearance. He is well-developed.   Cardiovascular:      Rate and Rhythm: Normal rate and regular rhythm.      Heart sounds: No murmur heard.  No friction rub. No gallop.    Pulmonary:      Effort: Pulmonary effort is normal.      Breath sounds: Normal breath sounds. No wheezing or rhonchi.   Musculoskeletal:      Right foot: No swelling, tenderness or bony tenderness.   Skin:     General: Skin is warm and dry.   Neurological:      Mental Status: He is alert and oriented to person, place, and time.      Cranial Nerves: No cranial nerve deficit.   Psychiatric:         Mood and Affect: Mood and affect normal.         Behavior: Behavior normal.         Thought Content: Thought content normal. Thought content does not include homicidal or suicidal ideation.         Judgment: Judgment normal.        Result Review :                 Assessment and Plan    Diagnoses and all orders for this visit:    1. Great toe pain, right (Primary)  -     CBC Auto Differential  -     Uric Acid  -     C-reactive Protein    Other orders  -     FluLaval/Fluarix/Fluzone >6 Months ([id number])    We discussed signs and symptoms of gout and prevention.  We will check some labs today to " include a uric acid, CBC and a C-reactive protein.  We discussed taking preventative medicine if needed, but I also advised him that he may not have another gout flare for a long time.    Follow Up   Return if symptoms worsen or fail to improve.  Patient was given instructions and counseling regarding his condition or for health maintenance advice. Please see specific information pulled into the AVS if appropriate.

## 2021-11-02 ENCOUNTER — TELEPHONE (OUTPATIENT)
Dept: FAMILY MEDICINE CLINIC | Facility: CLINIC | Age: 61
End: 2021-11-02

## 2021-11-02 DIAGNOSIS — M54.2 NECK PAIN: Primary | ICD-10-CM

## 2021-11-02 DIAGNOSIS — M62.838 MUSCLE SPASMS OF NECK: ICD-10-CM

## 2021-11-02 NOTE — TELEPHONE ENCOUNTER
Caller: Rico Armstrong    Relationship: Self    Best call back number: [phone]     What specialty or service is being requested: REHABILITATION    What is the provider, practice or medical service name: DR RAHUL GUEVRAA    Any additional details: PATIENT STATES THAT DR CHRISTINA OFFICE SUBMITTED A REFERRAL TO [facility] FOR THERE OFFICE AND IT WAS DENIED. PATIENT STATES HE NEEDS A REFERRAL TO DR GUEVARA FROM DR. SWARTZ TO BE SENT OVER TO [facility].

## 2022-02-14 ENCOUNTER — LAB (OUTPATIENT)
Dept: FAMILY MEDICINE CLINIC | Facility: CLINIC | Age: 62
End: 2022-02-14

## 2022-02-14 DIAGNOSIS — I10 ESSENTIAL HYPERTENSION: ICD-10-CM

## 2022-02-14 DIAGNOSIS — Z12.5 SCREENING FOR PROSTATE CANCER: ICD-10-CM

## 2022-02-14 DIAGNOSIS — E03.9 HYPOTHYROIDISM, UNSPECIFIED TYPE: ICD-10-CM

## 2022-02-14 DIAGNOSIS — Z51.81 MEDICATION MONITORING ENCOUNTER: ICD-10-CM

## 2022-02-14 DIAGNOSIS — E55.9 VITAMIN D DEFICIENCY: ICD-10-CM

## 2022-02-14 LAB
25(OH)D3 SERPL-MCNC: 48.3 NG/ML (ref 30–100)
BASOPHILS # BLD AUTO: 0.07 10*3/MM3 (ref 0–0.2)
BASOPHILS NFR BLD AUTO: 1.1 % (ref 0–1.5)
DEPRECATED RDW RBC AUTO: 41 FL (ref 37–54)
EOSINOPHIL # BLD AUTO: 0.22 10*3/MM3 (ref 0–0.4)
EOSINOPHIL NFR BLD AUTO: 3.5 % (ref 0.3–6.2)
ERYTHROCYTE [DISTWIDTH] IN BLOOD BY AUTOMATED COUNT: 12.9 % (ref 12.3–15.4)
HCT VFR BLD AUTO: 47.9 % (ref 37.5–51)
HGB BLD-MCNC: 16 G/DL (ref 13–17.7)
IMM GRANULOCYTES # BLD AUTO: 0.02 10*3/MM3 (ref 0–0.05)
IMM GRANULOCYTES NFR BLD AUTO: 0.3 % (ref 0–0.5)
LYMPHOCYTES # BLD AUTO: 3.11 10*3/MM3 (ref 0.7–3.1)
LYMPHOCYTES NFR BLD AUTO: 49.2 % (ref 19.6–45.3)
MCH RBC QN AUTO: 29.6 PG (ref 26.6–33)
MCHC RBC AUTO-ENTMCNC: 33.4 G/DL (ref 31.5–35.7)
MCV RBC AUTO: 88.5 FL (ref 79–97)
MONOCYTES # BLD AUTO: 0.53 10*3/MM3 (ref 0.1–0.9)
MONOCYTES NFR BLD AUTO: 8.4 % (ref 5–12)
NEUTROPHILS NFR BLD AUTO: 2.37 10*3/MM3 (ref 1.7–7)
NEUTROPHILS NFR BLD AUTO: 37.5 % (ref 42.7–76)
NRBC BLD AUTO-RTO: 0 /100 WBC (ref 0–0.2)
PLATELET # BLD AUTO: 260 10*3/MM3 (ref 140–450)
PMV BLD AUTO: 10.5 FL (ref 6–12)
RBC # BLD AUTO: 5.41 10*6/MM3 (ref 4.14–5.8)
WBC NRBC COR # BLD: 6.32 10*3/MM3 (ref 3.4–10.8)

## 2022-02-14 PROCEDURE — 85025 COMPLETE CBC W/AUTO DIFF WBC: CPT | Performed by: FAMILY MEDICINE

## 2022-02-14 PROCEDURE — 36415 COLL VENOUS BLD VENIPUNCTURE: CPT | Performed by: FAMILY MEDICINE

## 2022-02-14 PROCEDURE — 82306 VITAMIN D 25 HYDROXY: CPT | Performed by: FAMILY MEDICINE

## 2022-02-17 ENCOUNTER — OFFICE VISIT (OUTPATIENT)
Dept: FAMILY MEDICINE CLINIC | Facility: CLINIC | Age: 62
End: 2022-02-17

## 2022-02-17 VITALS
HEIGHT: 69 IN | HEART RATE: 75 BPM | OXYGEN SATURATION: 97 % | TEMPERATURE: 97.9 F | BODY MASS INDEX: 29.03 KG/M2 | WEIGHT: 196 LBS | DIASTOLIC BLOOD PRESSURE: 80 MMHG | SYSTOLIC BLOOD PRESSURE: 126 MMHG

## 2022-02-17 DIAGNOSIS — R20.2 TINGLING OF FACE: ICD-10-CM

## 2022-02-17 DIAGNOSIS — E78.5 HYPERLIPIDEMIA, UNSPECIFIED HYPERLIPIDEMIA TYPE: ICD-10-CM

## 2022-02-17 DIAGNOSIS — I10 ESSENTIAL HYPERTENSION: Primary | ICD-10-CM

## 2022-02-17 DIAGNOSIS — M10.9 GOUT, UNSPECIFIED CAUSE, UNSPECIFIED CHRONICITY, UNSPECIFIED SITE: ICD-10-CM

## 2022-02-17 DIAGNOSIS — Z85.828 PERSONAL HISTORY OF SKIN CANCER: ICD-10-CM

## 2022-02-17 DIAGNOSIS — E03.9 HYPOTHYROIDISM, UNSPECIFIED TYPE: ICD-10-CM

## 2022-02-17 PROCEDURE — 99214 OFFICE O/P EST MOD 30 MIN: CPT | Performed by: FAMILY MEDICINE

## 2022-02-17 NOTE — PROGRESS NOTES
"Chief Complaint  Vitamin D deficiency  Hypothyroidism  Hypertension  Hyperlipidemia    Subjective          Rico Armstrong presents to [facility]  History of Present Illness  Patient presents today for follow-up for hypertension, hyperlipidemia, hypothyroidism and vitamin D deficiency.  Unfortunately there was a difficult time getting labs from him recently.  We did get his CBC which overall look good.  Normal white blood cell count, hemoglobin and platelets.  His vitamin D level is adequate taking at 1000 international units once daily.  He is still due to have the rest of his labs done and states that he will come back in about a week or so to get them done.  I encouraged him to stay well-hydrated.  Blood pressure has been adequately controlled taking hydrochlorothiazide 25 mg daily.  He is taking Zocor 40 mg for hyperlipidemia.  Plan to repeat lipid panel at his earliest convenience.  He is still taking levothyroxine 75 mcg once daily.  He reports occasionally having some tingling on the left side of his face.  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  He was seen by Janis Emanuel recently with concern about gout.  He has not had any recurrence.  Objective   Vital Signs:   /80   Pulse 75   Temp 97.9 °F (36.6 °C)   Ht 175.3 cm (69\")   Wt 88.9 kg (196 lb)   SpO2 97%   BMI 28.94 kg/m²     Physical Exam   General: AAO ×3, no acute distress, pleasant  HEENT: Normocephalic, atraumatic  Cardiovascular: Regular rate and rhythm without appreciable murmur  Respiratory: Clear to auscultation bilaterally no RRW  Gastrointestinal: Soft nontender nondistended with bowel sounds present  extremities: No edema  Neurologic: CN II through XII grossly intact   Psychiatric: Normal mood and affect  Result Review :                 Assessment and Plan    Diagnoses and all orders for this visit:    1. Essential hypertension (Primary)    2. Gout, unspecified " cause, unspecified chronicity, unspecified site    3. Hypothyroidism, unspecified type    4. Personal history of skin cancer    5. Tingling of face, left    6. Hyperlipidemia, unspecified hyperlipidemia type    Patient states that the symptoms of tingling on the left side of his face have been mild.  He is not interested in any further evaluation or treatment at this time.  Should symptoms worsen he is instructed to call return.  We discussed continue current management of hypertension, hypothyroidism and hyperlipidemia.  Further medication changes to be made once his labs been completed.  Patient verbalized understanding and is in agreement with treatment and management plan.    Follow Up   Return in about 6 months (around 8/17/2022) for hld/hypothyroidism.  Patient was given instructions and counseling regarding his condition or for health maintenance advice. Please see specific information pulled into the AVS if appropriate.

## 2022-08-17 ENCOUNTER — OFFICE VISIT (OUTPATIENT)
Dept: FAMILY MEDICINE CLINIC | Facility: CLINIC | Age: 62
End: 2022-08-17

## 2022-08-17 VITALS
TEMPERATURE: 98.4 F | BODY MASS INDEX: 29.3 KG/M2 | HEART RATE: 67 BPM | SYSTOLIC BLOOD PRESSURE: 132 MMHG | WEIGHT: 197.8 LBS | DIASTOLIC BLOOD PRESSURE: 80 MMHG | HEIGHT: 69 IN | OXYGEN SATURATION: 100 %

## 2022-08-17 DIAGNOSIS — E78.5 HYPERLIPIDEMIA, UNSPECIFIED HYPERLIPIDEMIA TYPE: ICD-10-CM

## 2022-08-17 DIAGNOSIS — I10 ESSENTIAL HYPERTENSION: Primary | ICD-10-CM

## 2022-08-17 DIAGNOSIS — Z12.5 SCREENING FOR PROSTATE CANCER: ICD-10-CM

## 2022-08-17 DIAGNOSIS — E03.9 HYPOTHYROIDISM, UNSPECIFIED TYPE: ICD-10-CM

## 2022-08-17 DIAGNOSIS — H53.9 VISION CHANGES: ICD-10-CM

## 2022-08-17 DIAGNOSIS — H43.391 VITREOUS FLOATERS OF RIGHT EYE: ICD-10-CM

## 2022-08-17 LAB
ALBUMIN SERPL-MCNC: 4.2 G/DL (ref 3.5–5.2)
ALBUMIN/GLOB SERPL: 1.7 G/DL
ALP SERPL-CCNC: 53 U/L (ref 39–117)
ALT SERPL W P-5'-P-CCNC: 15 U/L (ref 1–41)
ANION GAP SERPL CALCULATED.3IONS-SCNC: 8.9 MMOL/L (ref 5–15)
AST SERPL-CCNC: 23 U/L (ref 1–40)
BASOPHILS # BLD AUTO: 0.04 10*3/MM3 (ref 0–0.2)
BASOPHILS NFR BLD AUTO: 0.7 % (ref 0–1.5)
BILIRUB SERPL-MCNC: 0.5 MG/DL (ref 0–1.2)
BUN SERPL-MCNC: 12 MG/DL (ref 8–23)
BUN/CREAT SERPL: 14.3 (ref 7–25)
CALCIUM SPEC-SCNC: 9.4 MG/DL (ref 8.6–10.5)
CHLORIDE SERPL-SCNC: 101 MMOL/L (ref 98–107)
CHOLEST SERPL-MCNC: 171 MG/DL (ref 0–200)
CO2 SERPL-SCNC: 28.1 MMOL/L (ref 22–29)
CREAT SERPL-MCNC: 0.84 MG/DL (ref 0.76–1.27)
DEPRECATED RDW RBC AUTO: 37.4 FL (ref 37–54)
EGFRCR SERPLBLD CKD-EPI 2021: 97.4 ML/MIN/1.73
EOSINOPHIL # BLD AUTO: 0.35 10*3/MM3 (ref 0–0.4)
EOSINOPHIL NFR BLD AUTO: 5.9 % (ref 0.3–6.2)
ERYTHROCYTE [DISTWIDTH] IN BLOOD BY AUTOMATED COUNT: 12.6 % (ref 12.3–15.4)
GLOBULIN UR ELPH-MCNC: 2.5 GM/DL
GLUCOSE SERPL-MCNC: 103 MG/DL (ref 65–99)
HCT VFR BLD AUTO: 43.5 % (ref 37.5–51)
HDLC SERPL-MCNC: 47 MG/DL (ref 40–60)
HGB BLD-MCNC: 14.8 G/DL (ref 13–17.7)
IMM GRANULOCYTES # BLD AUTO: 0.02 10*3/MM3 (ref 0–0.05)
IMM GRANULOCYTES NFR BLD AUTO: 0.3 % (ref 0–0.5)
LDLC SERPL CALC-MCNC: 101 MG/DL (ref 0–100)
LDLC/HDLC SERPL: 2.09 {RATIO}
LYMPHOCYTES # BLD AUTO: 2.45 10*3/MM3 (ref 0.7–3.1)
LYMPHOCYTES NFR BLD AUTO: 41.4 % (ref 19.6–45.3)
MCH RBC QN AUTO: 28.5 PG (ref 26.6–33)
MCHC RBC AUTO-ENTMCNC: 34 G/DL (ref 31.5–35.7)
MCV RBC AUTO: 83.7 FL (ref 79–97)
MONOCYTES # BLD AUTO: 0.49 10*3/MM3 (ref 0.1–0.9)
MONOCYTES NFR BLD AUTO: 8.3 % (ref 5–12)
NEUTROPHILS NFR BLD AUTO: 2.57 10*3/MM3 (ref 1.7–7)
NEUTROPHILS NFR BLD AUTO: 43.4 % (ref 42.7–76)
NRBC BLD AUTO-RTO: 0 /100 WBC (ref 0–0.2)
PLATELET # BLD AUTO: 281 10*3/MM3 (ref 140–450)
PMV BLD AUTO: 10 FL (ref 6–12)
POTASSIUM SERPL-SCNC: 3.9 MMOL/L (ref 3.5–5.2)
PROT SERPL-MCNC: 6.7 G/DL (ref 6–8.5)
PSA SERPL-MCNC: 0.34 NG/ML (ref 0–4)
RBC # BLD AUTO: 5.2 10*6/MM3 (ref 4.14–5.8)
SODIUM SERPL-SCNC: 138 MMOL/L (ref 136–145)
T4 FREE SERPL-MCNC: 1.13 NG/DL (ref 0.93–1.7)
TRIGL SERPL-MCNC: 128 MG/DL (ref 0–150)
TSH SERPL DL<=0.05 MIU/L-ACNC: 3.73 UIU/ML (ref 0.27–4.2)
VLDLC SERPL-MCNC: 23 MG/DL (ref 5–40)
WBC NRBC COR # BLD: 5.92 10*3/MM3 (ref 3.4–10.8)

## 2022-08-17 PROCEDURE — G0103 PSA SCREENING: HCPCS | Performed by: FAMILY MEDICINE

## 2022-08-17 PROCEDURE — 99214 OFFICE O/P EST MOD 30 MIN: CPT | Performed by: FAMILY MEDICINE

## 2022-08-17 PROCEDURE — 84439 ASSAY OF FREE THYROXINE: CPT | Performed by: FAMILY MEDICINE

## 2022-08-17 PROCEDURE — 80061 LIPID PANEL: CPT | Performed by: FAMILY MEDICINE

## 2022-08-17 PROCEDURE — 84443 ASSAY THYROID STIM HORMONE: CPT | Performed by: FAMILY MEDICINE

## 2022-08-17 PROCEDURE — 80053 COMPREHEN METABOLIC PANEL: CPT | Performed by: FAMILY MEDICINE

## 2022-08-17 PROCEDURE — 36415 COLL VENOUS BLD VENIPUNCTURE: CPT | Performed by: FAMILY MEDICINE

## 2022-08-17 PROCEDURE — 85025 COMPLETE CBC W/AUTO DIFF WBC: CPT | Performed by: FAMILY MEDICINE

## 2022-08-17 NOTE — PROGRESS NOTES
"Chief Complaint  Hypothyroidism  Right eye problem  Hypertension  HLD    Subjective        Rico Armstrong presents to [facility]  History of Present Illness  Patient presents today to follow-up for hypothyroidism, hypertension and hyperlipidemia.  He is due to have labs done so we discussed getting these.  He is also due to have a screening PSA done.  His last lipid panel showed his LDL at 93.  He is currently taking simvastatin 40 mg.  For hypertension he takes hydrochlorothiazide 25 mg.  He does not check his blood pressure at home regularly.  Blood pressure is adequately controlled in office today.  He also takes levothyroxine 75 mcg once daily.  He is due to have a TSH done.  Patient reports for the past month or so having a dark spot in his peripheral vision.  He does not always have it.  He denies having it today.  Objective   Vital Signs:  /80   Pulse 67   Temp 98.4 °F (36.9 °C)   Ht 175.3 cm (69\")   Wt 89.7 kg (197 lb 12.8 oz)   SpO2 100%   BMI 29.21 kg/m²   Estimated body mass index is 29.21 kg/m² as calculated from the following:    Height as of this encounter: 175.3 cm (69\").    Weight as of this encounter: 89.7 kg (197 lb 12.8 oz).          Physical Exam   General: AAO ×3, no acute distress, pleasant  HEENT: Normocephalic, atraumatic, no discharge in the eyes, no abnormalities appreciated on funduscopic exam.  No discharge from the nose, no oropharyngeal erythema or exudates, and TMs intact bilaterally with no erythema, no cervical tenderness or lymphadenopathy  Cardiovascular: Regular rate and rhythm without appreciable murmur  Respiratory: Clear to auscultation bilaterally no RRW  Gastrointestinal: Soft nontender nondistended with bowel sounds present  extremities: No edema  Neurologic: CN II through XII grossly intact   Psychiatric: Normal mood and affect  Result Review :                Assessment and Plan   Diagnoses and all orders for this " visit:    1. Essential hypertension (Primary)  -     CBC & Differential  -     Comprehensive Metabolic Panel    2. Hypothyroidism, unspecified type  -     TSH+Free T4    3. Hyperlipidemia, unspecified hyperlipidemia type  -     Lipid Panel    4. Screening for prostate cancer  -     PSA Screen    5. Vision changes  -     Ambulatory Referral to Ophthalmology    6. Vitreous floaters of right eye  -     Ambulatory Referral to Ophthalmology    I discussed with patient continue current management for hypertension, hypothyroidism and hyperlipidemia.  Plan to check a screening PSA.  As far as his right eye is concerned I suspect he may have floaters.  I discussed with him referral to ophthalmology for further evaluation.  Plan to see patient back in 6 months or sooner if needed.         Follow Up   Return in about 6 months (around 2/17/2023) for hypertension.  Patient was given instructions and counseling regarding his condition or for health maintenance advice. Please see specific information pulled into the AVS if appropriate.

## 2022-08-31 ENCOUNTER — TELEPHONE (OUTPATIENT)
Dept: FAMILY MEDICINE CLINIC | Facility: CLINIC | Age: 62
End: 2022-08-31

## 2022-08-31 NOTE — TELEPHONE ENCOUNTER
Phone call placed to patient in regards to his status. Feliz reported that his O2 sats was running 92 to 94% even with his Cpap heart 100 when it normally is in the 60's, stated that he just dont feel well, had chills, no appetite, and his chest hurts when he takes a deep breath. Patent denies a fever or congestion. Appointment made for 9/1/22 at 11:15 am to see the provider. Instructed patient if his O2 saturation drops below 90 to go to the ER with voiced understanding.

## 2022-09-01 ENCOUNTER — HOSPITAL ENCOUNTER (OUTPATIENT)
Dept: GENERAL RADIOLOGY | Facility: HOSPITAL | Age: 62
Discharge: HOME | End: 2022-09-01
Admitting: FAMILY MEDICINE

## 2022-09-01 ENCOUNTER — OFFICE VISIT (OUTPATIENT)
Dept: FAMILY MEDICINE CLINIC | Facility: CLINIC | Age: 62
End: 2022-09-01

## 2022-09-01 VITALS
WEIGHT: 201.1 LBS | OXYGEN SATURATION: 96 % | BODY MASS INDEX: 29.79 KG/M2 | TEMPERATURE: 97.8 F | DIASTOLIC BLOOD PRESSURE: 76 MMHG | SYSTOLIC BLOOD PRESSURE: 142 MMHG | HEART RATE: 76 BPM | HEIGHT: 69 IN

## 2022-09-01 DIAGNOSIS — R06.02 SHORTNESS OF BREATH: Primary | ICD-10-CM

## 2022-09-01 DIAGNOSIS — I10 ESSENTIAL HYPERTENSION: ICD-10-CM

## 2022-09-01 DIAGNOSIS — R06.02 SHORTNESS OF BREATH: ICD-10-CM

## 2022-09-01 PROCEDURE — 99213 OFFICE O/P EST LOW 20 MIN: CPT | Performed by: FAMILY MEDICINE

## 2022-09-01 PROCEDURE — 71046 X-RAY EXAM CHEST 2 VIEWS: CPT

## 2022-09-01 RX ORDER — AZITHROMYCIN 250 MG/1
TABLET, FILM COATED ORAL
Qty: 6 TABLET | Refills: 0 | Status: SHIPPED | OUTPATIENT
Start: 2022-09-01

## 2022-09-01 NOTE — PROGRESS NOTES
"Chief Complaint  Low Oxygen    Subjective        Rico Armstrong presents to [facility]  History of Present Illness  Patient presents today for an acute visit complaining of concerns of low oxygen.  He reports that on Monday he was having some chills and he checked his temperature which was normal but he did notice that his oxygen level was running around 91 through 94.  He denies any cough outside of some typical congestion he will have in the morning when he wakes up.  He reports overall feeling better today but is concerned and presents for further evaluation.  He did have COVID 19 at the end of July.  He did a recent home COVID test which was negative.  He presents today for further evaluation.  Objective   Vital Signs:  /76   Pulse 76   Temp 97.8 °F (36.6 °C)   Ht 175.3 cm (69\")   Wt 91.2 kg (201 lb 1.6 oz)   SpO2 96%   BMI 29.70 kg/m²   Estimated body mass index is 29.7 kg/m² as calculated from the following:    Height as of this encounter: 175.3 cm (69\").    Weight as of this encounter: 91.2 kg (201 lb 1.6 oz).          Physical Exam   General: AAO ×3, no acute distress, pleasant  HEENT: Normocephalic, atraumatic, no discharge in the eyes, no discharge from the nose, no oropharyngeal erythema or exudates, and TMs intact bilaterally with no erythema, no cervical tenderness or lymphadenopathy  Cardiovascular: Regular rate and rhythm without appreciable murmur  Respiratory: Clear to auscultation bilaterally no RRW  Gastrointestinal: Soft nontender nondistended with bowel sounds present  extremities: No edema  Neurologic: CN II through XII grossly intact   Psychiatric: Normal mood and affect  Result Review :                Assessment and Plan   Diagnoses and all orders for this visit:    1. Shortness of breath (Primary)  -     XR Chest PA & Lateral; Future    2. Essential hypertension    Other orders  -     azithromycin (Zithromax Z-Fuad) 250 MG tablet; Take 2 " tablets by mouth on day 1, then 1 tablet daily on days 2-5  Dispense: 6 tablet; Refill: 0      Patient's lungs are clear to auscultation.  I did discuss with him getting a chest x-ray.  I will empirically place him on azithromycin for bronchitis.  Patient overall is improving.  He is instructed to call return should there be any worsening symptoms or no improvement.  Oxygen levels on exam today are 96 and up to 97%.  Blood pressure was elevated today however he reports levels are better at home and the lowest systolic 130s.       Follow Up   Return if symptoms worsen or fail to improve.  Patient was given instructions and counseling regarding his condition or for health maintenance advice. Please see specific information pulled into the AVS if appropriate.

## 2022-09-08 ENCOUNTER — TELEPHONE (OUTPATIENT)
Dept: FAMILY MEDICINE CLINIC | Facility: CLINIC | Age: 62
End: 2022-09-08

## 2022-09-08 DIAGNOSIS — Z92.21 HISTORY OF CHEMOTHERAPY: Primary | ICD-10-CM

## 2022-09-08 NOTE — TELEPHONE ENCOUNTER
Patient stated that he gets his veins in his neck checked due to cancer. That he always gets it done. Looked in [facility] and he did go on 4/24/2019. Please advise.

## 2022-09-08 NOTE — TELEPHONE ENCOUNTER
Caller: Rico Armstrong    Relationship: Self    Best call back number:[phone]    What is the medical concern/diagnosis: VAINS IN NECK    What specialty or service is being requested:     What is the provider, practice or medical service name: DR [facility]    What is the office location:[address] [place] [address]    What is the office phone number: [phone]    Any additional details: PLEASE CALL PATIENT WHEN REFERRAL IS DONE.

## 2022-10-17 ENCOUNTER — CLINICAL SUPPORT (OUTPATIENT)
Dept: FAMILY MEDICINE CLINIC | Facility: CLINIC | Age: 62
End: 2022-10-17

## 2022-10-17 DIAGNOSIS — Z23 NEED FOR INFLUENZA VACCINATION: Primary | ICD-10-CM

## 2022-10-17 PROCEDURE — 90471 IMMUNIZATION ADMIN: CPT | Performed by: FAMILY MEDICINE

## 2022-10-17 PROCEDURE — 90686 IIV4 VACC NO PRSV 0.5 ML IM: CPT | Performed by: FAMILY MEDICINE

## 2023-01-03 ENCOUNTER — TELEPHONE (OUTPATIENT)
Dept: FAMILY MEDICINE CLINIC | Facility: CLINIC | Age: 63
End: 2023-01-03

## 2023-01-03 NOTE — TELEPHONE ENCOUNTER
Caller: Rico Armstrong    Relationship: Self    Best call back number: [phone]    What is the medical concern/diagnosis: TEAR IN RIGHT KNEE    What specialty or service is being requested:ORTHOPEDIC     Any additional details: PLEASE CALL PATIENT WHEN THE REFERRAL IS READY

## 2023-01-10 ENCOUNTER — TELEPHONE (OUTPATIENT)
Dept: FAMILY MEDICINE CLINIC | Facility: CLINIC | Age: 63
End: 2023-01-10

## 2023-01-10 DIAGNOSIS — M25.561 RIGHT KNEE PAIN, UNSPECIFIED CHRONICITY: Primary | ICD-10-CM

## 2023-01-10 NOTE — TELEPHONE ENCOUNTER
Caller: Rico Armstrong    Relationship: Self    Best call back number: [phone]    What is the medical concern/diagnosis: TEAR IN RIGHT KNEE    What specialty or service is being requested: ORTHOPEDICS    What is the provider, practice or medical service name: [facility]    What is the office location: [address] [place] [address]    What is the office phone number: [phone]    Any additional details: PATIENT CALLED AND STATED THAT HE PLACED A REFERRAL AND NEVER HEARD ANYTHING BACK. PATIENT'S REFERRAL WAS SENT TO THE WRONG OFFICE AND NEEDS TO BE PLACED AS SOON AS POSSIBLE.

## 2023-01-10 NOTE — TELEPHONE ENCOUNTER
Phone call placed to patient with follow up on 1/3/23 request to the wrong office and not routed to [facility]. Patient states that his right knee is hurting with the same type of pain he had before with a tear. Patient requested Misbah Stone MD for evaluation of right knee pain. Referral sent to provider for approval.

## 2023-03-08 ENCOUNTER — OFFICE VISIT (OUTPATIENT)
Dept: ORTHOPEDIC SURGERY | Facility: CLINIC | Age: 63
End: 2023-03-08
Payer: OTHER GOVERNMENT

## 2023-03-08 VITALS — HEIGHT: 69 IN | OXYGEN SATURATION: 97 % | WEIGHT: 202 LBS | BODY MASS INDEX: 29.92 KG/M2 | HEART RATE: 77 BPM

## 2023-03-08 DIAGNOSIS — S83.241A ACUTE MEDIAL MENISCUS TEAR OF RIGHT KNEE, INITIAL ENCOUNTER: ICD-10-CM

## 2023-03-08 DIAGNOSIS — M25.561 RIGHT KNEE PAIN, UNSPECIFIED CHRONICITY: Primary | ICD-10-CM

## 2023-03-08 DIAGNOSIS — M17.11 PRIMARY OSTEOARTHRITIS OF RIGHT KNEE: ICD-10-CM

## 2023-03-08 PROCEDURE — 99203 OFFICE O/P NEW LOW 30 MIN: CPT | Performed by: ORTHOPAEDIC SURGERY

## 2023-03-08 NOTE — PROGRESS NOTES
"Chief Complaint  Initial Evaluation and Pain of the Right Knee     Subjective      Rico Armstrong presents to [facility] for evaluation of the right knee. The patient reports in  he had a left knee scope for a meniscus tear.  He reports some instability to his knee. He denies injury or trauma. He has pain with twisting motions. He was in the  for 35 years    No Known Allergies     Social History     Socioeconomic History   • Marital status:    Tobacco Use   • Smoking status: Former     Packs/day: 2.00     Years: 5.00     Pack years: 10.00     Types: Cigarettes     Quit date:      Years since quittin.2   • Smokeless tobacco: Never   • Tobacco comments:     AGE [age]   Vaping Use   • Vaping Use: Never used   Substance and Sexual Activity   • Alcohol use: Not Currently   • Drug use: Defer   • Sexual activity: Defer        Review of Systems     Objective   Vital Signs:   Pulse 77   Ht 175.3 cm (69\")   Wt 91.6 kg (202 lb)   SpO2 97%   BMI 29.83 kg/m²       Physical Exam  Constitutional:       Appearance: Normal appearance. Patient is well-developed and normal weight.   HENT:      Head: Normocephalic.      Right Ear: Hearing and external ear normal.      Left Ear: Hearing and external ear normal.      Nose: Nose normal.   Eyes:      Conjunctiva/sclera: Conjunctivae normal.   Cardiovascular:      Rate and Rhythm: Normal rate.   Pulmonary:      Effort: Pulmonary effort is normal.      Breath sounds: No wheezing or rales.   Abdominal:      Palpations: Abdomen is soft.      Tenderness: There is no abdominal tenderness.   Musculoskeletal:      Cervical back: Normal range of motion.   Skin:     Findings: No rash.   Neurological:      Mental Status: Patient is alert and oriented to person, place, and time.   Psychiatric:         Mood and Affect: Mood and affect normal.         Judgment: Judgment normal.       Ortho Exam      Right knee- Stable to varus/valgus " stress. Stable to anterior/posterior drawer. Positive EHL, FHL, GS and TA. Sensation intact to all 5 nerves of the foot. Positive pulses. Negative Lachman's. Neurovascularly intact. Non-tender. No pain with hip motion. Positive Rosa's.        Procedures      Imaging Results (Most Recent)     Procedure Component Value Units Date/Time    XR Knee 3 View Right [[id number]] Resulted: 03/08/23 1421     Updated: 03/08/23 1421           Result Review :     X-Ray Report:  Right knee X-Ray  Indication: Evaluation of right knee pain  AP/Lateral, Standing and Sunrise view(s)  Findings: mild to moderate degenerative changes. Mild medial joint space narrowing   Prior studies available for comparison: no         No results found.           Assessment and Plan     Diagnoses and all orders for this visit:    1. Right knee pain, unspecified chronicity (Primary)  -     XR Knee 3 View Right    2. Primary osteoarthritis of right knee    3. Acute medial meniscus tear of right knee, initial encounter        Discussed the treatment plan with the patient.  I reviewed the x-rays that were obtained today with the patient. Plan for MRI of the right knee to evaluate the meniscus. The patient expressed understanding and wished to proceed. Home exercises given today.     Call or return if worsening symptoms.    Follow Up     MRI results      Patient was given instructions and counseling regarding his condition or for health maintenance advice. Please see specific information pulled into the AVS if appropriate.     Scribed for Misbah Stone MD by Janeth Ahn.  03/08/23   14:21 EST    I have personally performed the services described in this document as scribed by the above individual and it is both accurate and complete. Misbah Stone MD 03/08/23

## 2023-04-05 ENCOUNTER — HOSPITAL ENCOUNTER (OUTPATIENT)
Dept: MRI IMAGING | Facility: HOSPITAL | Age: 63
Discharge: HOME | End: 2023-04-05
Admitting: ORTHOPAEDIC SURGERY
Payer: OTHER GOVERNMENT

## 2023-04-05 DIAGNOSIS — S83.241A ACUTE MEDIAL MENISCUS TEAR OF RIGHT KNEE, INITIAL ENCOUNTER: ICD-10-CM

## 2023-04-05 DIAGNOSIS — M25.561 RIGHT KNEE PAIN, UNSPECIFIED CHRONICITY: ICD-10-CM

## 2023-04-05 DIAGNOSIS — M17.11 PRIMARY OSTEOARTHRITIS OF RIGHT KNEE: ICD-10-CM

## 2023-04-05 PROCEDURE — 73721 MRI JNT OF LWR EXTRE W/O DYE: CPT

## 2023-04-25 ENCOUNTER — OFFICE VISIT (OUTPATIENT)
Dept: ORTHOPEDIC SURGERY | Facility: CLINIC | Age: 63
End: 2023-04-25
Payer: OTHER GOVERNMENT

## 2023-04-25 VITALS — WEIGHT: 202 LBS | HEIGHT: 69 IN | BODY MASS INDEX: 29.92 KG/M2

## 2023-04-25 DIAGNOSIS — M17.11 PRIMARY OSTEOARTHRITIS OF RIGHT KNEE: Primary | ICD-10-CM

## 2023-04-25 DIAGNOSIS — S83.241A ACUTE MEDIAL MENISCUS TEAR OF RIGHT KNEE, INITIAL ENCOUNTER: ICD-10-CM

## 2023-04-25 NOTE — PROGRESS NOTES
"Chief Complaint  Pain and Follow-up of the Right Knee     Subjective      Rico Armstrong presents to [facility] for follow up of the right knee. He is here today for MRI results. His worse symptom is with transition from sit to stand and wait a minute before movement and with twisting. To review The patient reports in  he had a left knee scope for a meniscus tear.  He reports some instability to his knee. He denies injury or trauma. He has pain with twisting motions. He was in the  for 35 years    No Known Allergies     Social History     Socioeconomic History   • Marital status:    Tobacco Use   • Smoking status: Former     Packs/day: 2.00     Years: 5.00     Pack years: 10.00     Types: Cigarettes     Quit date:      Years since quittin.3   • Smokeless tobacco: Never   • Tobacco comments:     AGE [age]   Vaping Use   • Vaping Use: Never used   Substance and Sexual Activity   • Alcohol use: Not Currently   • Drug use: Defer   • Sexual activity: Defer        Review of Systems     Objective   Vital Signs:   Ht 175.3 cm (69\")   Wt 91.6 kg (202 lb)   BMI 29.83 kg/m²       Physical Exam  Constitutional:       Appearance: Normal appearance. Patient is well-developed and normal weight.   HENT:      Head: Normocephalic.      Right Ear: Hearing and external ear normal.      Left Ear: Hearing and external ear normal.      Nose: Nose normal.   Eyes:      Conjunctiva/sclera: Conjunctivae normal.   Cardiovascular:      Rate and Rhythm: Normal rate.   Pulmonary:      Effort: Pulmonary effort is normal.      Breath sounds: No wheezing or rales.   Abdominal:      Palpations: Abdomen is soft.      Tenderness: There is no abdominal tenderness.   Musculoskeletal:      Cervical back: Normal range of motion.   Skin:     Findings: No rash.   Neurological:      Mental Status: Patient is alert and oriented to person, place, and time.   Psychiatric:         Mood and Affect: " Mood and affect normal.         Judgment: Judgment normal.       Ortho Exam      RIGHT KNEE Flexion 130. Extension 0. Stable to varus/valgus stress. Stable to anterior/posterior drawer. Neurovascularly intact. Positive Rosa. Negative Lachman. Positive EHL, FHL, HS and TA. Sensation intact to light touch all 5 nerves of the foot. Ambulates with Antalgic gait. Patella is well tracking. Calf supple, non-tender. Positive tenderness to the medial joint line. Negative tenderness to the lateral joint line. Positive Crepitus. Good strength to hamstrings, quadriceps, dorsiflexors, and plantar flexors.  Knee Extensor Mechanism intact      Procedures      Imaging Results (Most Recent)     None           Result Review :       MRI Knee Right Without Contrast    Result Date: 4/5/2023  Narrative: PROCEDURE: MRI KNEE RIGHT  WO CONTRAST  COMPARISON: None  INDICATIONS: RIGHT POSTERIOR AND LATERAL KNEE PAIN, PAIN WORSE WHEN GOING FROM SITTING TO STANDING POSITION      TECHNIQUE: A complete multi-planar MRI was performed.   FINDINGS:  The cruciate ligaments, collateral ligaments, and extensor mechanism of the knee are intact.  There is thickening of the distal portion of the iliotibial band.  There is a small joint effusion.  There is a vertical tear of the posterior horn of the medial meniscus.  The lateral meniscus appears intact.  Mild degenerative changes are present within the medial and lateral compartments with surface fibrillation and partial thickness fissures.  Partial thickness fissures also present within the patellofemoral compartment.  No full-thickness defects identified.  No significant intra-articular body.  No Baker's cyst.. No significant focal abnormal bone marrow signal is identified. The cortical margins are intact. No significant abnormal focal fluid collection is observed within the surrounding soft tissues.      Impression:   1. Vertical tear of the posterior horn of the medial meniscus. 2. Mild  tricompartmental osteoarthritis. 3. Thickening of the distal portion of the iliotibial band without evidence of surrounding edema consistent with tendinosis..     SARIAH HERNANDEZ MD       Electronically Signed and Approved By: SARIAH HERNANDEZ MD on 4/05/2023 at 15:17                      Assessment and Plan     Diagnoses and all orders for this visit:    1. Primary osteoarthritis of right knee (Primary)    2. Acute medial meniscus tear of right knee, initial encounter        Discussed the treatment plan with the patient. I reviewed the MRI results with the patient. Discussed the treatment options with the patient, operative vs non-operative. The patient expressed understanding and wished to proceed with conservative measures.  Modify activity as needed.  HEP exercises.         Call or return if worsening symptoms.    Follow Up     PRN      Patient was given instructions and counseling regarding his condition or for health maintenance advice. Please see specific information pulled into the AVS if appropriate.     Scribed for Misbah Stone MD by Susana Goel MA.  04/25/23   15:24 EDT    I have personally performed the services described in this document as scribed by the above individual and it is both accurate and complete. Misbah Stone MD 04/26/23

## 2023-05-17 ENCOUNTER — TELEPHONE (OUTPATIENT)
Dept: FAMILY MEDICINE CLINIC | Facility: CLINIC | Age: 63
End: 2023-05-17
Payer: OTHER GOVERNMENT

## 2023-05-17 NOTE — TELEPHONE ENCOUNTER
Caller: CORRY LONG    Relationship: Emergency Contact    Best call back number: [phone]    What orders are you requesting (i.e. lab or imaging): ASYMPTOMATIC COVID TEST    In what timeframe would the patient need to come in: ASAP    Where will you receive your lab/imaging services: IN OFFICE    Additional notes: THE PATIENT'S WIFE WOULD LIKE A CALL BACK TO CONFIRM PATIENT CAN COME IN FOR COVID TEST. PATIENT IS NOT HAVING SYMPTOMS, BUT HAS BEEN EXPOSED TO COVID.

## 2023-05-18 NOTE — TELEPHONE ENCOUNTER
Spoke to patient's wife. Explained that we do not do the walk in Covid tests. Advised him to go to either Urgent Care or call a pharmacy to see if they will test him. Offered appointment, if patient was having symptoms. Wife stated he is not. He was just exposed.

## 2023-06-26 ENCOUNTER — TELEPHONE (OUTPATIENT)
Dept: FAMILY MEDICINE CLINIC | Facility: CLINIC | Age: 63
End: 2023-06-26

## 2023-06-26 NOTE — TELEPHONE ENCOUNTER
Caller: Rico Armstrong    Relationship: Self    Best call back number: [phone]     What is the medical concern/diagnosis: DERMATOLOGY CONTINUATION OF REFERRAL/RENEWAL    What specialty or service is being requested: UPDATED REFERRAL    What is the provider, practice or medical service name: [facility], [facility]OHNSON

## 2023-11-14 ENCOUNTER — CLINICAL SUPPORT (OUTPATIENT)
Dept: FAMILY MEDICINE CLINIC | Facility: CLINIC | Age: 63
End: 2023-11-14
Payer: MEDICARE

## 2023-11-14 DIAGNOSIS — Z23 NEED FOR INFLUENZA VACCINATION: Primary | ICD-10-CM

## 2023-11-14 PROCEDURE — 90662 IIV NO PRSV INCREASED AG IM: CPT | Performed by: FAMILY MEDICINE

## 2023-11-14 PROCEDURE — 90471 IMMUNIZATION ADMIN: CPT | Performed by: FAMILY MEDICINE

## 2024-01-17 ENCOUNTER — CLINICAL SUPPORT (OUTPATIENT)
Dept: FAMILY MEDICINE CLINIC | Facility: CLINIC | Age: 64
End: 2024-01-17
Payer: MEDICARE

## 2024-01-17 DIAGNOSIS — Z51.81 MEDICATION MONITORING ENCOUNTER: ICD-10-CM

## 2024-01-17 DIAGNOSIS — E03.9 HYPOTHYROIDISM, UNSPECIFIED TYPE: ICD-10-CM

## 2024-01-17 DIAGNOSIS — E55.9 VITAMIN D DEFICIENCY: ICD-10-CM

## 2024-01-17 DIAGNOSIS — Z11.59 NEED FOR HEPATITIS C SCREENING TEST: ICD-10-CM

## 2024-01-17 DIAGNOSIS — Z12.5 SCREENING FOR PROSTATE CANCER: ICD-10-CM

## 2024-01-17 DIAGNOSIS — R73.09 ABNORMAL GLUCOSE: ICD-10-CM

## 2024-01-17 DIAGNOSIS — I10 ESSENTIAL HYPERTENSION: ICD-10-CM

## 2024-01-17 LAB
25(OH)D3 SERPL-MCNC: 55.4 NG/ML (ref 30–100)
ALBUMIN SERPL-MCNC: 4.8 G/DL (ref 3.5–5.2)
ALBUMIN/GLOB SERPL: 1.7 G/DL
ALP SERPL-CCNC: 58 U/L (ref 39–117)
ALT SERPL W P-5'-P-CCNC: 17 U/L (ref 1–41)
ANION GAP SERPL CALCULATED.3IONS-SCNC: 12.8 MMOL/L (ref 5–15)
AST SERPL-CCNC: 27 U/L (ref 1–40)
BASOPHILS # BLD AUTO: 0.07 10*3/MM3 (ref 0–0.2)
BASOPHILS NFR BLD AUTO: 1 % (ref 0–1.5)
BILIRUB SERPL-MCNC: 0.4 MG/DL (ref 0–1.2)
BUN SERPL-MCNC: 16 MG/DL (ref 8–23)
BUN/CREAT SERPL: 13.6 (ref 7–25)
CALCIUM SPEC-SCNC: 9.8 MG/DL (ref 8.6–10.5)
CHLORIDE SERPL-SCNC: 97 MMOL/L (ref 98–107)
CO2 SERPL-SCNC: 29.2 MMOL/L (ref 22–29)
CREAT SERPL-MCNC: 1.18 MG/DL (ref 0.76–1.27)
DEPRECATED RDW RBC AUTO: 39.2 FL (ref 37–54)
EGFRCR SERPLBLD CKD-EPI 2021: 68.5 ML/MIN/1.73
EOSINOPHIL # BLD AUTO: 0.54 10*3/MM3 (ref 0–0.4)
EOSINOPHIL NFR BLD AUTO: 7.7 % (ref 0.3–6.2)
ERYTHROCYTE [DISTWIDTH] IN BLOOD BY AUTOMATED COUNT: 12.8 % (ref 12.3–15.4)
GLOBULIN UR ELPH-MCNC: 2.8 GM/DL
GLUCOSE SERPL-MCNC: 69 MG/DL (ref 65–99)
HBA1C MFR BLD: 5.9 % (ref 4.8–5.6)
HCT VFR BLD AUTO: 46.6 % (ref 37.5–51)
HCV AB SER DONR QL: NORMAL
HGB BLD-MCNC: 15.3 G/DL (ref 13–17.7)
IMM GRANULOCYTES # BLD AUTO: 0.01 10*3/MM3 (ref 0–0.05)
IMM GRANULOCYTES NFR BLD AUTO: 0.1 % (ref 0–0.5)
LYMPHOCYTES # BLD AUTO: 2.89 10*3/MM3 (ref 0.7–3.1)
LYMPHOCYTES NFR BLD AUTO: 41.1 % (ref 19.6–45.3)
MCH RBC QN AUTO: 28.1 PG (ref 26.6–33)
MCHC RBC AUTO-ENTMCNC: 32.8 G/DL (ref 31.5–35.7)
MCV RBC AUTO: 85.7 FL (ref 79–97)
MONOCYTES # BLD AUTO: 0.57 10*3/MM3 (ref 0.1–0.9)
MONOCYTES NFR BLD AUTO: 8.1 % (ref 5–12)
NEUTROPHILS NFR BLD AUTO: 2.95 10*3/MM3 (ref 1.7–7)
NEUTROPHILS NFR BLD AUTO: 42 % (ref 42.7–76)
NRBC BLD AUTO-RTO: 0 /100 WBC (ref 0–0.2)
PLATELET # BLD AUTO: 302 10*3/MM3 (ref 140–450)
PMV BLD AUTO: 10 FL (ref 6–12)
POTASSIUM SERPL-SCNC: 3.7 MMOL/L (ref 3.5–5.2)
PROT SERPL-MCNC: 7.6 G/DL (ref 6–8.5)
PSA SERPL-MCNC: 0.33 NG/ML (ref 0–4)
RBC # BLD AUTO: 5.44 10*6/MM3 (ref 4.14–5.8)
SODIUM SERPL-SCNC: 139 MMOL/L (ref 136–145)
T4 FREE SERPL-MCNC: 1.15 NG/DL (ref 0.93–1.7)
TSH SERPL DL<=0.05 MIU/L-ACNC: 5.97 UIU/ML (ref 0.27–4.2)
WBC NRBC COR # BLD AUTO: 7.03 10*3/MM3 (ref 3.4–10.8)

## 2024-01-17 PROCEDURE — 84443 ASSAY THYROID STIM HORMONE: CPT | Performed by: FAMILY MEDICINE

## 2024-01-17 PROCEDURE — 83036 HEMOGLOBIN GLYCOSYLATED A1C: CPT | Performed by: FAMILY MEDICINE

## 2024-01-17 PROCEDURE — 82306 VITAMIN D 25 HYDROXY: CPT | Performed by: FAMILY MEDICINE

## 2024-01-17 PROCEDURE — G0103 PSA SCREENING: HCPCS | Performed by: FAMILY MEDICINE

## 2024-01-17 PROCEDURE — 85025 COMPLETE CBC W/AUTO DIFF WBC: CPT | Performed by: FAMILY MEDICINE

## 2024-01-17 PROCEDURE — 84439 ASSAY OF FREE THYROXINE: CPT | Performed by: FAMILY MEDICINE

## 2024-01-17 PROCEDURE — 86803 HEPATITIS C AB TEST: CPT | Performed by: FAMILY MEDICINE

## 2024-01-17 PROCEDURE — 80053 COMPREHEN METABOLIC PANEL: CPT | Performed by: FAMILY MEDICINE

## 2024-01-22 ENCOUNTER — OFFICE VISIT (OUTPATIENT)
Dept: FAMILY MEDICINE CLINIC | Facility: CLINIC | Age: 64
End: 2024-01-22
Payer: MEDICARE

## 2024-01-22 VITALS
DIASTOLIC BLOOD PRESSURE: 82 MMHG | WEIGHT: 216.5 LBS | HEIGHT: 69 IN | HEART RATE: 70 BPM | TEMPERATURE: 97.9 F | OXYGEN SATURATION: 97 % | BODY MASS INDEX: 32.07 KG/M2 | SYSTOLIC BLOOD PRESSURE: 140 MMHG

## 2024-01-22 DIAGNOSIS — Z51.81 MEDICATION MONITORING ENCOUNTER: ICD-10-CM

## 2024-01-22 DIAGNOSIS — E55.9 VITAMIN D DEFICIENCY: ICD-10-CM

## 2024-01-22 DIAGNOSIS — R73.03 PREDIABETES: ICD-10-CM

## 2024-01-22 DIAGNOSIS — Z13.6 SCREENING FOR AAA (ABDOMINAL AORTIC ANEURYSM): ICD-10-CM

## 2024-01-22 DIAGNOSIS — Z82.49 FAMILY HISTORY OF CORONARY ARTERY DISEASE: ICD-10-CM

## 2024-01-22 DIAGNOSIS — Z23 NEED FOR PNEUMOCOCCAL VACCINATION: ICD-10-CM

## 2024-01-22 DIAGNOSIS — I70.90 ATHEROSCLEROSIS: ICD-10-CM

## 2024-01-22 DIAGNOSIS — E03.9 HYPOTHYROIDISM, UNSPECIFIED TYPE: ICD-10-CM

## 2024-01-22 DIAGNOSIS — I10 ESSENTIAL HYPERTENSION: Primary | ICD-10-CM

## 2024-01-22 DIAGNOSIS — E78.00 PURE HYPERCHOLESTEROLEMIA: ICD-10-CM

## 2024-01-22 DIAGNOSIS — E53.8 B12 DEFICIENCY: ICD-10-CM

## 2024-01-22 PROCEDURE — 99214 OFFICE O/P EST MOD 30 MIN: CPT | Performed by: FAMILY MEDICINE

## 2024-01-22 PROCEDURE — 3079F DIAST BP 80-89 MM HG: CPT | Performed by: FAMILY MEDICINE

## 2024-01-22 PROCEDURE — 3077F SYST BP >= 140 MM HG: CPT | Performed by: FAMILY MEDICINE

## 2024-01-22 PROCEDURE — G0009 ADMIN PNEUMOCOCCAL VACCINE: HCPCS | Performed by: FAMILY MEDICINE

## 2024-01-22 PROCEDURE — 90677 PCV20 VACCINE IM: CPT | Performed by: FAMILY MEDICINE

## 2024-01-22 NOTE — PROGRESS NOTES
Chief Complaint  Hypertension      Subjective        Rico Armstrong presents to [facility]  History of Present Illness  Patient presents today to follow-up for hypertension and hypothyroidism.  He is taking hydrochlorothiazide 25 mg daily for hypertension.  He just took the medication about an hour before coming in.  Blood pressure is 140/82 today.  He is encouraged to take his blood pressure medication regularly as he does admit to missing doses at times.  He is taking his levothyroxine 75 mcg with the rest of his medication we did discuss that this may cause absorption issues.  He did have labs done prior to the appointment so we reviewed these.  He tested negative for hepatitis C.  Vitamin D level was normal.  CMP showed normal renal function with normal LFTs.  CBC showed no anemia with normal white blood cell count and platelets.  A1c was 5.9% which places him in the prediabetic range.  We did discuss working on lifestyle changes including diet and exercise.  PSA level was normal at 0.330.  TSH level was elevated at 5.970.  I suspect that this is an issue with absorption and we did discuss taking the levothyroxine on empty stomach 30 to 60 minutes before eating.  I will keep him on the 75 mcg daily at this time.  He does continue to take simvastatin 40 mg daily.  His last lipid panel showed his LDL at 101.  We discussed having labs updated again when he returns for follow-up.  Plan also check a B12 level and vitamin D level again.  He is due for screening for abdominal aortic aneurysm.  He quit smoking 30 years ago but has smoked more than 100 cigarettes in his lifetime.  We also discussed evaluation for atherosclerosis.  I do not see any previously reported on prior CTs however he does have a family history of members having bypass surgery.  We did discuss getting a cardiac CT done.  Chest CT previously in 2019 showed that he did have minimal atherosclerotic  "calcifications noted in the aortic arch.  He is already taking aspirin as well as simvastatin.  He is due for pneumococcal 20 vaccination so we discussed getting this done.  Objective   Vital Signs:  /82 (BP Location: Left arm, Patient Position: Sitting)   Pulse 70   Temp 97.9 °F (36.6 °C) (Oral)   Ht 175.3 cm (69\")   Wt 98.2 kg (216 lb 8 oz)   SpO2 97%   BMI 31.97 kg/m²   Estimated body mass index is 31.97 kg/m² as calculated from the following:    Height as of this encounter: 175.3 cm (69\").    Weight as of this encounter: 98.2 kg (216 lb 8 oz).       BMI is >= 30 and <35. (Class 1 Obesity). The following options were offered after discussion;: exercise counseling/recommendations and nutrition counseling/recommendations      Physical Exam  Vitals reviewed.   Constitutional:       Appearance: Normal appearance.   HENT:      Head: Normocephalic and atraumatic.      Right Ear: External ear normal.      Left Ear: External ear normal.      Mouth/Throat:      Pharynx: No oropharyngeal exudate.   Eyes:      Conjunctiva/sclera: Conjunctivae normal.   Cardiovascular:      Rate and Rhythm: Normal rate and regular rhythm.      Heart sounds: No murmur heard.     No friction rub. No gallop.   Pulmonary:      Effort: Pulmonary effort is normal.      Breath sounds: Normal breath sounds. No wheezing or rhonchi.   Abdominal:      General: Bowel sounds are normal. There is no distension.      Palpations: Abdomen is soft.      Tenderness: There is no abdominal tenderness.   Skin:     General: Skin is warm and dry.   Neurological:      Mental Status: He is alert and oriented to person, place, and time.      Cranial Nerves: No cranial nerve deficit.   Psychiatric:         Mood and Affect: Mood and affect normal.         Behavior: Behavior normal.         Thought Content: Thought content normal.         Judgment: Judgment normal.        Result Review :                     Assessment and Plan     Diagnoses and all orders for " this visit:    1. Essential hypertension (Primary)  -     CBC & Differential; Future  -     Comprehensive Metabolic Panel; Future    2. Hypothyroidism, unspecified type  -     TSH+Free T4; Future    3. Medication monitoring encounter  -     CBC & Differential; Future  -     Comprehensive Metabolic Panel; Future    4. Pure hypercholesterolemia  -     Lipid Panel; Future  -     CT Cardiac Calcium Score Without Dye; Future    5. B12 deficiency  -     Vitamin B12; Future    6. Prediabetes    7. Screening for AAA (abdominal aortic aneurysm)  -     US AAA Screen Limited; Future    8. Atherosclerosis  -     CT Cardiac Calcium Score Without Dye; Future    9. Family history of coronary artery disease  -     CT Cardiac Calcium Score Without Dye; Future    10. Need for pneumococcal vaccination  -     Pneumococcal Conjugate Vaccine 20-Valent (PCV20)    11. Vitamin D deficiency  -     Vitamin D,25-Hydroxy; Future    Plan to continue current management for hypertension and hypothyroidism as well as hyperlipidemia at this time.  Plan as documented above to have further evaluation including a cardiac CT as well as getting a screening for abdominal aortic aneurysm.  I plan to see patient back in 6 months or sooner if needed.  Patient instructed to call with any questions or concerns.         Follow Up     Return in about 6 months (around 7/22/2024) for Medicare Wellness, Hypertension.  Patient was given instructions and counseling regarding his condition or for health maintenance advice. Please see specific information pulled into the AVS if appropriate.

## 2024-01-26 ENCOUNTER — TELEPHONE (OUTPATIENT)
Dept: FAMILY MEDICINE CLINIC | Facility: CLINIC | Age: 64
End: 2024-01-26
Payer: OTHER GOVERNMENT

## 2024-01-26 NOTE — TELEPHONE ENCOUNTER
Caller: Rico Armstrong    Relationship: Self    Best call back number: [phone]     What was the call regarding: PATIENT STATES WE NEED TO ORDER HIS CT CARDIAC CALCIUM SCORE AS A WELCOME TO MEDICARE SO THAT HIS INSURANCE WILL PAY INSTEAD OF HIM HAVING TO PAY OUT OF POCKET.

## 2024-01-29 NOTE — TELEPHONE ENCOUNTER
Please contact the patient to find out more information.  What he is asking for does not make sense.  A cardiac CT was ordered.  Please check with scheduling.  This should not be different depending on if this is coming from a Medicare visit.  If this is the case then he actually needs to have an appointment and I cannot order it otherwise.

## 2024-02-16 ENCOUNTER — OFFICE VISIT (OUTPATIENT)
Dept: FAMILY MEDICINE CLINIC | Facility: CLINIC | Age: 64
End: 2024-02-16
Payer: MEDICARE

## 2024-02-16 ENCOUNTER — HOSPITAL ENCOUNTER (OUTPATIENT)
Dept: GENERAL RADIOLOGY | Facility: HOSPITAL | Age: 64
Discharge: HOME | End: 2024-02-16
Payer: MEDICARE

## 2024-02-16 VITALS
TEMPERATURE: 97.6 F | WEIGHT: 212 LBS | SYSTOLIC BLOOD PRESSURE: 128 MMHG | HEART RATE: 86 BPM | BODY MASS INDEX: 31.4 KG/M2 | HEIGHT: 69 IN | OXYGEN SATURATION: 99 % | DIASTOLIC BLOOD PRESSURE: 80 MMHG

## 2024-02-16 DIAGNOSIS — R05.3 CHRONIC COUGH: Primary | ICD-10-CM

## 2024-02-16 DIAGNOSIS — R05.3 CHRONIC COUGH: ICD-10-CM

## 2024-02-16 LAB
EXPIRATION DATE: NORMAL
FLUAV AG UPPER RESP QL IA.RAPID: NOT DETECTED
FLUBV AG UPPER RESP QL IA.RAPID: NOT DETECTED
INTERNAL CONTROL: NORMAL
Lab: NORMAL
SARS-COV-2 AG UPPER RESP QL IA.RAPID: NOT DETECTED

## 2024-02-16 PROCEDURE — 3079F DIAST BP 80-89 MM HG: CPT

## 2024-02-16 PROCEDURE — 71046 X-RAY EXAM CHEST 2 VIEWS: CPT

## 2024-02-16 PROCEDURE — 3074F SYST BP LT 130 MM HG: CPT

## 2024-02-16 PROCEDURE — 99214 OFFICE O/P EST MOD 30 MIN: CPT

## 2024-02-16 PROCEDURE — 87428 SARSCOV & INF VIR A&B AG IA: CPT

## 2024-02-16 RX ORDER — DEXTROMETHORPHAN HYDROBROMIDE AND PROMETHAZINE HYDROCHLORIDE 15; 6.25 MG/5ML; MG/5ML
5 SYRUP ORAL 4 TIMES DAILY PRN
Qty: 180 ML | Refills: 0 | Status: SHIPPED | OUTPATIENT
Start: 2024-02-16 | End: 2024-02-23

## 2024-02-16 RX ORDER — BENZONATATE 100 MG/1
200 CAPSULE ORAL 3 TIMES DAILY PRN
Qty: 42 CAPSULE | Refills: 0 | Status: SHIPPED | OUTPATIENT
Start: 2024-02-16 | End: 2024-02-23

## 2024-02-16 RX ORDER — METHYLPREDNISOLONE 4 MG/1
TABLET ORAL
Qty: 21 TABLET | Refills: 0 | Status: SHIPPED | OUTPATIENT
Start: 2024-02-16

## 2024-02-21 ENCOUNTER — TELEPHONE (OUTPATIENT)
Dept: FAMILY MEDICINE CLINIC | Facility: CLINIC | Age: 64
End: 2024-02-21
Payer: OTHER GOVERNMENT

## 2024-02-21 NOTE — TELEPHONE ENCOUNTER
Phone call placed by referral for followup on Cardiac Calcium Score referral where patient's insurance does not cover this procedure. Patient was asked by the provider regarding a referral for cardiology with refusal for referral at this time.

## 2024-02-22 ENCOUNTER — TELEPHONE (OUTPATIENT)
Dept: FAMILY MEDICINE CLINIC | Facility: CLINIC | Age: 64
End: 2024-02-22
Payer: OTHER GOVERNMENT

## 2024-02-22 NOTE — TELEPHONE ENCOUNTER
Caller: Rico Armstrong    Relationship: Self    Best call back number: [phone]     What is the best time to reach you: ANYTIME     Who are you requesting to speak with (clinical staff, provider,  specific staff member): CLINICAL       What was the call regarding: PATIENT IS CALLING REQUESTING TO SPEAK WITH PCP, ABOUT A SCOPE TESTING TO BE DONE, OR ORDER PLACED.

## 2024-02-27 NOTE — TELEPHONE ENCOUNTER
Please let patient know that I will try to attend to him at my earliest availability.  Can you please get some information as to what he is requesting.

## 2024-02-29 ENCOUNTER — TELEPHONE (OUTPATIENT)
Dept: FAMILY MEDICINE CLINIC | Facility: CLINIC | Age: 64
End: 2024-02-29
Payer: OTHER GOVERNMENT

## 2024-02-29 NOTE — TELEPHONE ENCOUNTER
PT HAS A HIGH HERNIA, AND HIS ACID REFLUX HAS BEEN GIVING HIM FITS LATELY. PT IS WANTING A UPPER SCOPE BUT DOESN'T KNOW WHETHER TO GO TO GASTRO TO GET THAT DONE. IS NOT ON ANY OMEPERZOLE OR PANTOPRAZOLE AND IS TAKING NEXIUM SINCE 2008. SCHEDULED HIM FOR [place] ON MARCH 29TH. HE WANTED THE EARLIEST APPOINTMENT TO GET THIS STARTED.

## 2024-03-07 ENCOUNTER — HOSPITAL ENCOUNTER (OUTPATIENT)
Dept: ULTRASOUND IMAGING | Facility: HOSPITAL | Age: 64
Discharge: HOME | End: 2024-03-07
Admitting: FAMILY MEDICINE
Payer: MEDICARE

## 2024-03-07 DIAGNOSIS — Z13.6 SCREENING FOR AAA (ABDOMINAL AORTIC ANEURYSM): ICD-10-CM

## 2024-03-07 PROCEDURE — 76706 US ABDL AORTA SCREEN AAA: CPT

## 2024-03-29 ENCOUNTER — OFFICE VISIT (OUTPATIENT)
Dept: FAMILY MEDICINE CLINIC | Facility: CLINIC | Age: 64
End: 2024-03-29
Payer: MEDICARE

## 2024-03-29 VITALS
BODY MASS INDEX: 32.61 KG/M2 | HEART RATE: 71 BPM | SYSTOLIC BLOOD PRESSURE: 146 MMHG | DIASTOLIC BLOOD PRESSURE: 78 MMHG | WEIGHT: 220.2 LBS | TEMPERATURE: 97.9 F | OXYGEN SATURATION: 94 % | HEIGHT: 69 IN

## 2024-03-29 DIAGNOSIS — H57.9 EYE LESION: ICD-10-CM

## 2024-03-29 DIAGNOSIS — K21.9 GASTROESOPHAGEAL REFLUX DISEASE, UNSPECIFIED WHETHER ESOPHAGITIS PRESENT: ICD-10-CM

## 2024-03-29 DIAGNOSIS — K44.9 HIATAL HERNIA: Primary | ICD-10-CM

## 2024-03-29 PROCEDURE — 99214 OFFICE O/P EST MOD 30 MIN: CPT

## 2024-03-29 PROCEDURE — 3078F DIAST BP <80 MM HG: CPT

## 2024-03-29 PROCEDURE — 3077F SYST BP >= 140 MM HG: CPT

## 2024-03-29 PROCEDURE — 1160F RVW MEDS BY RX/DR IN RCRD: CPT

## 2024-03-29 PROCEDURE — 1159F MED LIST DOCD IN RCRD: CPT

## 2024-03-29 RX ORDER — FLUTICASONE PROPIONATE 50 MCG
2 SPRAY, SUSPENSION (ML) NASAL DAILY
Qty: 16 G | Refills: 3 | Status: SHIPPED | OUTPATIENT
Start: 2024-03-29

## 2024-03-29 RX ORDER — FAMOTIDINE 20 MG/1
20 TABLET, FILM COATED ORAL 2 TIMES DAILY
Qty: 180 TABLET | Refills: 1 | Status: SHIPPED | OUTPATIENT
Start: 2024-03-29

## 2024-03-29 NOTE — PROGRESS NOTES
Chief Complaint  Chief Complaint   Patient presents with    Heartburn     Pt has acid reflux, been taking nexium since 2008. Pt states he has a hiatal hernia and has been having increased symptoms of burning in esophagus and throat.       Subjective      Rico Armstrong presents to [facility]  The patient is a 63-year-old male who comes in today with complaints of GERD and hiatal hernia.    He has a known hiatal hernia and his reflux has been getting worse. He has been on Nexium for a long time. It was working initially, but over the last year, it would flare up usually at night when he was asleep. It would wake him up in the middle of the night with a burning sensation in his throat. He would get up and go downstairs and take a Nexium and wait until it calmed down. About a month ago, it flared up and he experienced burning. He had a colonoscopy and EGD and was told that he had a hiatal hernia. He was told that if that is the case, it may not be allowing the sphincter to close off all the way and contributing to his symptoms. His last scope was in 07/2018 at [facility] with Dr. Woods. When he retired in 2017 or 2018, they tried to change him from Nexium to Pepcid. He tried several different medications for 6 weeks with no benefit. He has reduced red tomato-based foods like spaghetti sauces, ketchup, chili, and carbonated beverages.    He had neck cancer several years ago with radiation.  He was told that he had a hiatal hernia and narrowing of the esophagus. At one point, the ENT stretched out the top of the esophagus because of narrowing suspected to be caused from radiation treatments. He has to chew up well because he can easily choke.     His wife noticed a spot on his right eye. It is not painful, but it is sensitive. He does not remember when he had his last eye examination.   It is not itchy, and he denies pressure. He finds himself rubbing his eyes a little bit more when  the allergy season first starts. He uses Flonase.    Testing  Endoscopy from 2018 was reviewed with the patient.    Objective     Medical History:  Past Medical History:   Diagnosis Date    Allergies     Esophageal reflux     HLD (hyperlipidemia)     HTN (hypertension)     Hypothyroid     CHERRY (obstructive sleep apnea) 2019    Throat cancer     Tonsil cancer     SEE DR ELY WILKS AT [facility]. DR MCKINLEY RIGGS AND DR RAHUL GUEVARA FOR CANCER REHAB SQUAMOUS CELL CARCINOMA     Past Surgical History:   Procedure Laterality Date    APPENDECTOMY      COLONOSCOPY  2018    GASTROSTOMY FEEDING TUBE INSERTION      KNEE SURGERY      NOSE SURGERY      CARTLIAGE GRAFT AUTOGENOUS, TO NOSE FROM RIB    PORTACATH PLACEMENT      SKIN CANCER EXCISION        Social History     Tobacco Use    Smoking status: Former     Current packs/day: 0.00     Average packs/day: 2.0 packs/day for 5.0 years (10.0 ttl pk-yrs)     Types: Cigarettes     Start date:      Quit date:      Years since quittin.2    Smokeless tobacco: Never    Tobacco comments:     AGE [age]   Vaping Use    Vaping status: Never Used   Substance Use Topics    Alcohol use: Not Currently    Drug use: Defer     Family History   Problem Relation Age of Onset    Stroke Other     Alzheimer's disease Other     Heart disease Other     Diabetes Other        Medications:  Prior to Admission medications    Medication Sig Start Date End Date Taking? Authorizing Provider   Aspirin Low Dose 81 MG EC tablet  21  Yes ProviderShravan MD   cetirizine (zyrTEC) 10 MG tablet  7/3/21  Yes ProviderShravan MD   cholecalciferol (VITAMIN D3) 25 MCG (1000 UT) tablet  10/26/21  Yes ProviderShravan MD   Cyanocobalamin ER 1000 MCG tablet controlled-release Take  by mouth Daily.   Yes ProviderShravna MD   esomeprazole (nexIUM) 40 MG capsule Nexium 40 mg oral capsule,delayed release(DR/EC) take 1 capsule (40 mg) by oral route once daily    "Suspended   Yes Shravan Barahona MD   fluticasone (Flonase) 50 MCG/ACT nasal spray 2 sprays into the nostril(s) as directed by provider Daily. 6/22/21  Yes Minh Shay DO   hydroCHLOROthiazide (HYDRODIURIL) 25 MG tablet  7/26/21  Yes Shravan Barahona MD   levothyroxine (SYNTHROID, LEVOTHROID) 75 MCG tablet Synthroid 75 mcg oral tablet take 1 tablet (75 mcg) by oral route once daily   Active   Yes Shravan Barahona MD   multivitamin (THERAGRAN) tablet tablet Take  by mouth Daily.   Yes Shravan Barahona MD   Omega-3 Fatty Acids (fish oil) 1000 MG capsule capsule Take  by mouth Daily.   Yes Shravan Barahona MD   simvastatin (ZOCOR) 40 MG tablet  7/3/21  Yes Shravan Barahona MD   Sod Fluoride-Potassium Nitrate (PreviDent 5000 Sensitive) 1.1-5 % paste Prevident 5000 Enamel Protect 1.1-5 % dental paste Brush before bed and do not rinse   Active   Yes Shravan Barahona MD   methylPREDNISolone (MEDROL) 4 MG dose pack Take as directed on package instructions. 2/16/24   Korin Villatoro APRN        Allergies:   Patient has no known allergies.    Health Maintenance Due   Topic Date Due    ANNUAL WELLNESS VISIT  Never done    LIPID PANEL  08/17/2023         Vital Signs:   /78 (BP Location: Left arm, Patient Position: Sitting, Cuff Size: Adult)   Pulse 71   Temp 97.9 °F (36.6 °C) (Oral)   Ht 175.3 cm (69\")   Wt 99.9 kg (220 lb 3.2 oz)   SpO2 94%   BMI 32.52 kg/m²     Wt Readings from Last 3 Encounters:   03/29/24 99.9 kg (220 lb 3.2 oz)   02/16/24 96.2 kg (212 lb)   01/22/24 98.2 kg (216 lb 8 oz)     BP Readings from Last 3 Encounters:   03/29/24 146/78   02/16/24 128/80   01/22/24 140/82       Physical Exam  Vitals reviewed.   Constitutional:       Appearance: Normal appearance. He is well-developed.   HENT:      Head: Normocephalic and atraumatic.   Eyes:      General: Lids are normal. No visual field deficit.        Right eye: No foreign body or discharge.      Extraocular " Movements: Extraocular movements intact.      Right eye: Normal extraocular motion.      Conjunctiva/sclera: Conjunctivae normal.      Right eye: Right conjunctiva is not injected. No chemosis.     Pupils: Pupils are equal, round, and reactive to light.        Comments: White colored deposit/lesion to right eye; no pain, discharge, erythema.   Cardiovascular:      Rate and Rhythm: Normal rate and regular rhythm.      Heart sounds: No murmur heard.     No friction rub. No gallop.   Pulmonary:      Effort: Pulmonary effort is normal.      Breath sounds: Normal breath sounds. No wheezing or rhonchi.   Abdominal:      General: There is no distension.      Tenderness: There is no abdominal tenderness.   Skin:     General: Skin is warm and dry.   Neurological:      Mental Status: He is alert and oriented to person, place, and time.   Psychiatric:         Mood and Affect: Affect normal.           Result Review :    The following data was reviewed by Milly Sauceda on 03/29/24 at 08:57 EDT:                             Assessment and Plan    Diagnoses and all orders for this visit:    1. Hiatal hernia (Primary)  -     famotidine (Pepcid) 20 MG tablet; Take 1 tablet by mouth 2 (Two) Times a Day.  Dispense: 180 tablet; Refill: 1  -     Ambulatory Referral to Gastroenterology    2. Gastroesophageal reflux disease, unspecified whether esophagitis present  -     famotidine (Pepcid) 20 MG tablet; Take 1 tablet by mouth 2 (Two) Times a Day.  Dispense: 180 tablet; Refill: 1  -     Ambulatory Referral to Gastroenterology    3. Eye lesion    Other orders  -     fluticasone (Flonase) 50 MCG/ACT nasal spray; 2 sprays into the nostril(s) as directed by provider Daily.  Dispense: 16 g; Refill: 3       GERD and hiatal hernia  I will refer him back to Dr. Woods for a repeat upper endoscopy. In the meantime, I will start him on Pepcid twice a day. He will continue taking Nexium.    Right eye spot  It could be a fatty or cholesterol deposit in  his eye. I will refer him to Dr. Mitzy Gomez. I will refill his Flonase.          Follow Up   Return if symptoms worsen or fail to improve.  Patient was given instructions and counseling regarding his condition or for health maintenance advice. Please see specific information pulled into the AVS if appropriate.     Please note that portions of this note were completed with a voice recognition program.    Transcribed from ambient dictation for BARRERA Krause by Milly Sauceda.  03/29/24   08:57 EDT    Patient or patient representative verbalized consent to the visit recording.  I have personally performed the services described in this document as transcribed by the above individual, and it is both accurate and complete.

## 2024-04-08 ENCOUNTER — TELEPHONE (OUTPATIENT)
Dept: GASTROENTEROLOGY | Facility: CLINIC | Age: 64
End: 2024-04-08

## 2024-04-08 ENCOUNTER — OFFICE VISIT (OUTPATIENT)
Dept: GASTROENTEROLOGY | Facility: CLINIC | Age: 64
End: 2024-04-08
Payer: MEDICARE

## 2024-04-08 VITALS
SYSTOLIC BLOOD PRESSURE: 157 MMHG | OXYGEN SATURATION: 97 % | HEART RATE: 65 BPM | DIASTOLIC BLOOD PRESSURE: 65 MMHG | HEIGHT: 69 IN | BODY MASS INDEX: 31.4 KG/M2 | WEIGHT: 212 LBS

## 2024-04-08 DIAGNOSIS — K44.9 HIATAL HERNIA: ICD-10-CM

## 2024-04-08 DIAGNOSIS — K20.0 ESOPHAGITIS, EOSINOPHILIC: ICD-10-CM

## 2024-04-08 DIAGNOSIS — R12 HEARTBURN: Primary | ICD-10-CM

## 2024-04-08 DIAGNOSIS — R13.12 OROPHARYNGEAL DYSPHAGIA: ICD-10-CM

## 2024-04-08 PROCEDURE — 3077F SYST BP >= 140 MM HG: CPT | Performed by: NURSE PRACTITIONER

## 2024-04-08 PROCEDURE — 1160F RVW MEDS BY RX/DR IN RCRD: CPT | Performed by: NURSE PRACTITIONER

## 2024-04-08 PROCEDURE — 99214 OFFICE O/P EST MOD 30 MIN: CPT | Performed by: NURSE PRACTITIONER

## 2024-04-08 PROCEDURE — 1159F MED LIST DOCD IN RCRD: CPT | Performed by: NURSE PRACTITIONER

## 2024-04-08 PROCEDURE — 3078F DIAST BP <80 MM HG: CPT | Performed by: NURSE PRACTITIONER

## 2024-04-08 NOTE — TELEPHONE ENCOUNTER
Called patient back to R/s scope. Procedure was moved to may 29th per patient request. Arrival time given 9:00AM

## 2024-04-08 NOTE — PROGRESS NOTES
Patient Name: Rico Armstrong   Visit Date: 04/08/2024   Patient ID: [id number]  Provider: BARRERA Mohamud    Sex: male  Location:  Location Address:  Location Phone: [address] [place] [address] [phone]    YOB: 1960  Age: 63 y.o.   Primary Care Provider Minh Shay DO      Referring Provider: Aria Iyer, *        Chief Complaint  Heartburn (History of hiatal hernia; Worsening symptoms even with Nexium treatment)    History of Present Illness  New (old) pt states he always has to watch when he eats and swallows, states if he doesn't chew well, he feels like he's choking , occurs about every 3 months. Large pills are a problem.   He states he made appt d/t HB worsening. Takes Nexium daily. Occasionally at night he awakes with burning at night and gets up and takes a Nexium at night also. Pepcid recently added to his regimen.   Cut back on caffeine and a little improvement.   Hx throat and tonsilar cancer 2015, had radiation and chemo treatment  Regular BM's, no diarrhea or blood in stool     EGD 7/2018 by Dr. Woods: Small size hiatal hernia, esophagitis possibly eosinophilic-eosinophilic esophagitis noted, 11 mm polyp in the stomach body removed-fundic gland polyp  EGD/colonoscopy May 2018 by Dr. Woods: Good prep, hiatal hernia, Schatzki's ring with dilation performed from 15 to 18 mm, biopsy showed increased eosinophils up to 35 per high-power field, 4 small polyps in the stomach removed-fundic gland, normal duodenum, diverticula noted in the sigmoid otherwise negative  Past Medical History:   Diagnosis Date    Allergies     Esophageal reflux     HLD (hyperlipidemia)     HTN (hypertension)     Hypothyroid     CHERRY (obstructive sleep apnea) 02/14/2019    Throat cancer     Tonsil cancer     SEE DR ELY WILKS AT [facility]. DR MCKINLEY RIGGS AND DR RAHUL GUEVARA FOR CANCER REHAB SQUAMOUS CELL CARCINOMA       Past Surgical History:   Procedure  "Laterality Date    APPENDECTOMY      COLONOSCOPY  2018    GASTROSTOMY FEEDING TUBE INSERTION      KNEE SURGERY      NOSE SURGERY      CARTLIAGE GRAFT AUTOGENOUS, TO NOSE FROM RIB    PORTACATH PLACEMENT      SKIN CANCER EXCISION      LEFT EAR    UPPER GASTROINTESTINAL ENDOSCOPY         No Known Allergies    Family History   Problem Relation Age of Onset    Stroke Other     Alzheimer's disease Other     Heart disease Other     Diabetes Other     Colon cancer Neg Hx         Social History     Tobacco Use    Smoking status: Former     Current packs/day: 0.00     Average packs/day: 2.0 packs/day for 5.0 years (10.0 ttl pk-yrs)     Types: Cigarettes     Start date:      Quit date:      Years since quittin.2     Passive exposure: Past    Smokeless tobacco: Never    Tobacco comments:     AGE [age]   Vaping Use    Vaping status: Never Used   Substance Use Topics    Alcohol use: Not Currently     Comment: RARE SOCIALLY    Drug use: Defer       Objective     Vital Signs:   /65 (BP Location: Right arm, Patient Position: Sitting, Cuff Size: Adult)   Pulse 65   Ht 175.3 cm (69\")   Wt 96.2 kg (212 lb)   SpO2 97%   BMI 31.31 kg/m²       Physical Exam  Constitutional:       General: The patient is not in acute distress.     Appearance: Normal appearance.   HENT:      Head: Normocephalic and atraumatic.      Nose: Nose normal.   Pulmonary:      Effort: Pulmonary effort is normal. No respiratory distress.   Abdominal:      General: Abdomen is flat.      Palpations: Abdomen is soft. There is no mass.      Tenderness: There is no abdominal tenderness. There is no guarding.   Musculoskeletal:      Cervical back: Neck supple.      Right lower leg: No edema.      Left lower leg: No edema.   Skin:     General: Skin is warm and dry.   Neurological:      General: No focal deficit present.      Mental Status: The patient is alert and oriented to person, place, and time.      Gait: Gait normal.   Psychiatric:       "   Mood and Affect: Mood normal.         Speech: Speech normal.         Behavior: Behavior normal.         Thought Content: Thought content normal.     Result Review :   The following data was reviewed by: BARRERA Mohamud on 04/08/2024:    CBC w/diff          1/17/2024    11:30   CBC w/Diff   WBC 7.03    RBC 5.44    Hemoglobin 15.3    Hematocrit 46.6    MCV 85.7    MCH 28.1    MCHC 32.8    RDW 12.8    Platelets 302    Neutrophil Rel % 42.0    Immature Granulocyte Rel % 0.1    Lymphocyte Rel % 41.1    Monocyte Rel % 8.1    Eosinophil Rel % 7.7    Basophil Rel % 1.0      CMP          1/17/2024    11:30   CMP   Glucose 69    BUN 16    Creatinine 1.18    EGFR 68.5    Sodium 139    Potassium 3.7    Chloride 97    Calcium 9.8    Total Protein 7.6    Albumin 4.8    Globulin 2.8    Total Bilirubin 0.4    Alkaline Phosphatase 58    AST (SGOT) 27    ALT (SGPT) 17    Albumin/Globulin Ratio 1.7    BUN/Creatinine Ratio 13.6    Anion Gap 12.8                    Assessment and Plan    Diagnoses and all orders for this visit:    1. Heartburn (Primary)  -     Case Request; Standing  -     Case Request    2. Esophagitis, eosinophilic  -     Case Request; Standing  -     Case Request    3. Hiatal hernia  -     Case Request; Standing  -     Case Request    4. Oropharyngeal dysphagia  -     FL Video Swallow With Speech Single Contrast; Future  -     FL ESOPHAGRAM DOUBLE CONTRAST; Future    Other orders  -     Follow Anesthesia Guidelines / Protocol; Standing  -     Follow Anesthesia Guidelines / Protocol; Future  -     Obtain Informed Consent; Future  -     Verify NPO; Standing  -     Obtain Informed Consent; Standing              Follow Up   Return if symptoms worsen or fail to improve.  EGD Surgical Risk and Benefits: Possible risks/complications, benefits, and alternatives to surgical or invasive procedure have been explained to patient and/or legal guardian; risks include bleeding, infection, and perforation. Patient  has been evaluated and can tolerate anesthesia and/or sedation. Risks, benefits, and alternatives to anesthesia and sedation have been explained to patient and/or legal guardian.   Modified barium swallow /esophagram  Pt preferred to continue Nexium and did not want to change regimens at this time     Patient was given instructions and counseling regarding his condition or for health maintenance advice. Please see specific information pulled into the AVS if appropriate.

## 2024-04-08 NOTE — TELEPHONE ENCOUNTER
Hub staff attempted to follow warm transfer process and was unsuccessful     Caller: KERRI ETHAN    Relationship to patient: SELF    Best call back number: [phone]     Patient is needing: MR. LONG WAS SEEN BY RODRICK AZAR ON 4/8/24 AND SCHEDULED A PROCEDURE. HE NEEDS TO RESCHEDULE THAT PROCEDURE. PLEASE REVIEW AND ADVISE.    OK TO CALL BACK ANYTIME. OK TO LEAVE .

## 2024-04-11 ENCOUNTER — PATIENT ROUNDING (BHMG ONLY) (OUTPATIENT)
Dept: GASTROENTEROLOGY | Facility: CLINIC | Age: 64
End: 2024-04-11
Payer: OTHER GOVERNMENT

## 2024-04-11 NOTE — PROGRESS NOTES
"4/11/2024      Hello, may I speak with Rico Armstrong     My name is Philippe. I am calling from [facility]. I show that you had a recent visit with BARRERA Newton.    Before we get started may I verify your date of birth? 1960    I am calling to officially welcome you to our practice and ask about your recent visit. Is this a good time to talk? Yes    Tell me about your visit with us. What things went well? \"I had no issues with my visit with Palmira.\"     We strive to ensure that we protect your safety and privacy. Is there anything we could have done to improve this during your visit?   No     We're always looking for ways to make our patients' experiences even better. Do you have recommendations on ways we may improve? No    Overall were you satisfied with your first visit to our practice? Yes    I appreciate you taking the time to speak with me today. Is there anything else I can do for you? No    I am glad to hear that you had a very good visit and I appreciate you taking the time to provide feedback on this call. We would greatly appreciate you filling out a survey if you receive one in the mail, email or text. This is a great opportunity to provide any additional feedback that you may think of after this call as well.       Thank you, and have a great day.    "
Gen: Alert, NAD, well appearing  Head: NC, AT, PERRL, EOMI, normal lids/conjunctiva  ENT: B TM WNL, normal hearing, patent oropharynx without erythema/exudate, uvula midline  Neck: +supple, no tenderness/meningismus/JVD, +Trachea midline  Pulm: Bilateral BS, normal resp effort, no wheeze/stridor/retractions  CV: RRR, no M/R/G, +dist pulses  Abd: soft, NT/ND, +BS, no hepatosplenomegaly  Mskel: no erythema/cyanosis, ttp R lateral malleolus with mild swelling, able to wiggle all toes, sensations intact, str 5/5,  Skin: no rash or open wound  Neuro: AAOx3, no sensory/motor deficits, CN 2-12 intact

## 2024-05-16 ENCOUNTER — HOSPITAL ENCOUNTER (OUTPATIENT)
Dept: GENERAL RADIOLOGY | Facility: HOSPITAL | Age: 64
Discharge: HOME | End: 2024-05-16
Payer: MEDICARE

## 2024-05-16 DIAGNOSIS — R13.12 OROPHARYNGEAL DYSPHAGIA: ICD-10-CM

## 2024-05-16 PROCEDURE — 74230 X-RAY XM SWLNG FUNCJ C+: CPT

## 2024-05-16 PROCEDURE — 92611 MOTION FLUOROSCOPY/SWALLOW: CPT

## 2024-05-16 RX ADMIN — BARIUM SULFATE 355 ML: 0.6 SUSPENSION ORAL at 09:04

## 2024-05-16 RX ADMIN — BARIUM SULFATE 1 TEASPOON(S): 0.6 CREAM ORAL at 09:05

## 2024-05-16 RX ADMIN — BARIUM SULFATE 135 ML: 980 POWDER, FOR SUSPENSION ORAL at 09:04

## 2024-05-16 RX ADMIN — BARIUM SULFATE 55 ML: 0.81 POWDER, FOR SUSPENSION ORAL at 09:04

## 2024-05-16 RX ADMIN — BARIUM SULFATE 50 ML: 400 SUSPENSION ORAL at 09:04

## 2024-05-16 RX ADMIN — ANTACID/ANTIFLATULENT 1 PACKET: 380; 550; 10; 10 GRANULE, EFFERVESCENT ORAL at 09:05

## 2024-05-16 RX ADMIN — BARIUM SULFATE 700 MG: 700 TABLET ORAL at 09:04

## 2024-05-16 NOTE — MBS/VFSS/FEES
outpatient  - Speech Language Pathology   Swallow  modified barium swallow study  MATEO Hahn     Patient Name: Rico Armstrong  : 1960  MRN: [id number]  Today's Date: 2024               Admit Date: 2024    Visit Dx:     ICD-10-CM ICD-9-CM   1. Oropharyngeal dysphagia  R13.12 787.22     Patient Active Problem List   Diagnosis    Colon cancer screening    Esophageal reflux    HLD (hyperlipidemia)    HTN (hypertension)    Hypertension, benign    Hypothyroid    Malignant neoplasm of ear, nose, and throat    Need for hepatitis C screening test    CHERRY (obstructive sleep apnea)    Other acute sinusitis    Palpitations    Paroxysmal supraventricular tachycardia    Tachycardia    Tonsil cancer    Heartburn    Esophagitis, eosinophilic    Hiatal hernia     Past Medical History:   Diagnosis Date    Allergies     Esophageal reflux     HLD (hyperlipidemia)     HTN (hypertension)     Hypothyroid     CHERRY (obstructive sleep apnea) 2019    Throat cancer     Tonsil cancer     SEE DR ELY WILKS AT [facility]. DR MCKINLEY RIGGS AND DR RAHUL GUEVARA FOR CANCER REHAB SQUAMOUS CELL CARCINOMA     Past Surgical History:   Procedure Laterality Date    APPENDECTOMY      COLONOSCOPY  2018    GASTROSTOMY FEEDING TUBE INSERTION      KNEE SURGERY      NOSE SURGERY      CARTLIAGE GRAFT AUTOGENOUS, TO NOSE FROM RIB    PORTACATH PLACEMENT      SKIN CANCER EXCISION      LEFT EAR    UPPER GASTROINTESTINAL ENDOSCOPY         SLP Recommendation and Plan         MODIFIED BARIUM SWALLOW STUDY: SPEECH PATHOLOGY REPORT        DATE OF SERVICE: 2024    PERTINENT INFORMATION:  Mr. Armstrong is a 63 year old male with complaint of dysphagia.    He was referred for an MBSS by BARRERA Newton to rule out aspiration as well as to determine appropriate treatment plan for this patient.      PROCEDURE:    Mr. Armstrong was alert and cooperative.  The patient was viewed in lateral plane.  The following Ba  consistencies were administered: Thin barium, nectar thick barium, barium paste, barium mixed applesauce, barium mixed with cracker.  The following compensatory swallowing strategies were performed: Bolus modification, cyclic ingestion      RESULTS:    1.  Nectar thick liquid by cup.  Swallow completed.  Residue noted in the vallecula.  2.  Thin liquid by cup.  Swallow completed.  Some residue noted in the vallecula.  3.  Purée by spoon.  Swallow completed.  Residue noted in the vallecula.  4.  Pudding/paste by spoon.  Lingual pumping.  Swallow completed.  5.  Thin liquid via straw.  Swallow completed.  Liquid wash assist with vallecular residue clearance.  6.  Solid.  Chewing with lingual pumping.  Swallow completed.  Residue in the vallecula.  Double swallow partially clears.  Liquid wash assist of thin liquid via straw with clearance of residue.      IMPRESSIONS:    Mr. Armstrong demonstrated oral pharyngeal swallow grossly within functional limits.  No aspiration or penetration observed during this study.  Occasional residue noted in the vallecula of thicker and solid consistencies however liquid wash assist with clearance.    FUNCTIONAL DEFICIT: Patient scored level 7 of 7 on Functional Communication Measures for swallowing indicating a 0% limitation in function for current status, goal status, and discharge status.      RECOMMENDATIONS:   1.  Diet: Regular solids, thin liquids  2.  Positioning: Fully upright for all p.o. intake, 30 minutes following  3.  Compensatory strategies: Alternate solids and liquids, double swallow with solids.          Yes, patient/responsible party agrees with the plan of care and has been informed of all alternatives, risks and benefits.    Thank you for this referral.                                                                                 EDUCATION  The patient has been educated in the following areas:   Dysphagia (Swallowing Impairment).                Time Calculation:        Therapy Charges for Today       Code Description Service Date Service Provider Modifiers Qty    [id number] HC ST MOTION FLUORO EVAL SWALLOW 6 5/16/2024 Christine Villegas, MS-CCC/SLP, CNT GN 1                 NATHALIE Lopes/SLP, GENET  5/16/2024

## 2024-05-22 NOTE — PRE-PROCEDURE INSTRUCTIONS
"Instructed on date and arrival time of 0830. Come to entrance \"C\".  Must have  over age 18 to drive home.  May have two visitors; however, children under 12 must stay in waiting room.  Discussed diet/NPO.  May take medications as usual except for blood thinners, diabetic medications, or weight loss medications.  Verbalized understanding of instructions given.  Instructed to call for questions or concerns.  "

## 2024-05-28 ENCOUNTER — ANESTHESIA EVENT (OUTPATIENT)
Dept: GASTROENTEROLOGY | Facility: HOSPITAL | Age: 64
End: 2024-05-28
Payer: MEDICARE

## 2024-05-28 NOTE — ANESTHESIA PREPROCEDURE EVALUATION
Anesthesia Evaluation     Patient summary reviewed and Nursing notes reviewed   NPO Solid Status: > 8 hours  NPO Liquid Status: > 8 hours           Airway   Mallampati: II  TM distance: >3 FB  Neck ROM: full  Possible difficult intubation  Comment: Previous nasal fx     Previous tonsil , throat CA - s/p radiation in 2015   Dental - normal exam     Pulmonary     breath sounds clear to auscultation  (+) a smoker Former,sleep apnea  Cardiovascular - normal exam  Exercise tolerance: good (4-7 METS)    ECG reviewed  Rhythm: regular  Rate: normal    (+) hypertension well controlled, hyperlipidemia      Neuro/Psych  GI/Hepatic/Renal/Endo    (+) hiatal hernia, GERD well controlled, thyroid problem hypothyroidism    Musculoskeletal     Abdominal    Substance History      OB/GYN          Other      history of cancer (TONSIL/THROAT CANCER s/p radiation  2015)    ROS/Med Hx Other: EKG 06/17/20: HR 71, SR                  Anesthesia Plan    ASA 3     general   total IV anesthesia  (Total IV Anesthesia    Patient understands anesthesia not responsible for dental damage.  )  intravenous induction     Anesthetic plan, risks, benefits, and alternatives have been provided, discussed and informed consent has been obtained with: patient and spouse/significant other.  Pre-procedure education provided  Plan discussed with CRNA.    CODE STATUS:

## 2024-05-29 ENCOUNTER — HOSPITAL ENCOUNTER (OUTPATIENT)
Facility: HOSPITAL | Age: 64
Setting detail: HOSPITAL OUTPATIENT SURGERY
Discharge: HOME | End: 2024-05-29
Attending: INTERNAL MEDICINE | Admitting: INTERNAL MEDICINE
Payer: MEDICARE

## 2024-05-29 ENCOUNTER — ANESTHESIA (OUTPATIENT)
Dept: GASTROENTEROLOGY | Facility: HOSPITAL | Age: 64
End: 2024-05-29
Payer: MEDICARE

## 2024-05-29 VITALS
TEMPERATURE: 97.2 F | WEIGHT: 216.93 LBS | DIASTOLIC BLOOD PRESSURE: 72 MMHG | HEART RATE: 55 BPM | RESPIRATION RATE: 19 BRPM | OXYGEN SATURATION: 95 % | BODY MASS INDEX: 32.04 KG/M2 | SYSTOLIC BLOOD PRESSURE: 115 MMHG

## 2024-05-29 DIAGNOSIS — R12 HEARTBURN: ICD-10-CM

## 2024-05-29 DIAGNOSIS — K44.9 HIATAL HERNIA: ICD-10-CM

## 2024-05-29 DIAGNOSIS — K20.0 ESOPHAGITIS, EOSINOPHILIC: ICD-10-CM

## 2024-05-29 PROCEDURE — 25010000002 PROPOFOL 10 MG/ML EMULSION: Performed by: NURSE ANESTHETIST, CERTIFIED REGISTERED

## 2024-05-29 PROCEDURE — 25810000003 LACTATED RINGERS PER 1000 ML: Performed by: NURSE ANESTHETIST, CERTIFIED REGISTERED

## 2024-05-29 PROCEDURE — 43239 EGD BIOPSY SINGLE/MULTIPLE: CPT | Performed by: INTERNAL MEDICINE

## 2024-05-29 PROCEDURE — 88305 TISSUE EXAM BY PATHOLOGIST: CPT | Performed by: INTERNAL MEDICINE

## 2024-05-29 RX ORDER — PROPOFOL 10 MG/ML
VIAL (ML) INTRAVENOUS CONTINUOUS PRN
Status: DISCONTINUED | OUTPATIENT
Start: 2024-05-29 | End: 2024-05-29 | Stop reason: SURG

## 2024-05-29 RX ORDER — SODIUM CHLORIDE, SODIUM LACTATE, POTASSIUM CHLORIDE, CALCIUM CHLORIDE 600; 310; 30; 20 MG/100ML; MG/100ML; MG/100ML; MG/100ML
INJECTION, SOLUTION INTRAVENOUS CONTINUOUS PRN
Status: DISCONTINUED | OUTPATIENT
Start: 2024-05-29 | End: 2024-05-29 | Stop reason: SURG

## 2024-05-29 RX ORDER — SODIUM CHLORIDE, SODIUM LACTATE, POTASSIUM CHLORIDE, CALCIUM CHLORIDE 600; 310; 30; 20 MG/100ML; MG/100ML; MG/100ML; MG/100ML
30 INJECTION, SOLUTION INTRAVENOUS CONTINUOUS
Status: DISCONTINUED | OUTPATIENT
Start: 2024-05-29 | End: 2024-05-29 | Stop reason: HOSPADM

## 2024-05-29 RX ORDER — PANTOPRAZOLE SODIUM 40 MG/1
40 TABLET, DELAYED RELEASE ORAL DAILY
Qty: 30 TABLET | Refills: 5 | Status: SHIPPED | OUTPATIENT
Start: 2024-05-29

## 2024-05-29 RX ORDER — LIDOCAINE HYDROCHLORIDE 20 MG/ML
INJECTION, SOLUTION EPIDURAL; INFILTRATION; INTRACAUDAL; PERINEURAL AS NEEDED
Status: DISCONTINUED | OUTPATIENT
Start: 2024-05-29 | End: 2024-05-29 | Stop reason: SURG

## 2024-05-29 RX ADMIN — SODIUM CHLORIDE, POTASSIUM CHLORIDE, SODIUM LACTATE AND CALCIUM CHLORIDE: 600; 310; 30; 20 INJECTION, SOLUTION INTRAVENOUS at 10:25

## 2024-05-29 RX ADMIN — SODIUM CHLORIDE, POTASSIUM CHLORIDE, SODIUM LACTATE AND CALCIUM CHLORIDE 30 ML/HR: 600; 310; 30; 20 INJECTION, SOLUTION INTRAVENOUS at 09:41

## 2024-05-29 RX ADMIN — LIDOCAINE HYDROCHLORIDE 80 MG: 20 INJECTION, SOLUTION EPIDURAL; INFILTRATION; INTRACAUDAL; PERINEURAL at 10:26

## 2024-05-29 RX ADMIN — PROPOFOL 185 MCG/KG/MIN: 10 INJECTION, EMULSION INTRAVENOUS at 10:26

## 2024-05-29 NOTE — ANESTHESIA POSTPROCEDURE EVALUATION
Patient: Rico Armstrong    Procedure Summary       Date: 05/29/24 Room / Location: [facility] ENDOSCOPY 4 / [facility] ENDOSCOPY    Anesthesia Start: 1022 Anesthesia Stop: 1040    Procedure: ESOPHAGOGASTRODUODENOSCOPY WITH BIOPSIES Diagnosis:       Heartburn      Esophagitis, eosinophilic      Hiatal hernia      (Heartburn [R12])      (Esophagitis, eosinophilic [K20.0])      (Hiatal hernia [K44.9])    Surgeons: Ryan Woods MD Provider: Jamie Ramos CRNA    Anesthesia Type: general ASA Status: 3            Anesthesia Type: general    Vitals  Vitals Value Taken Time   /72 05/29/24 1055   Temp 36.2 °C (97.2 °F) 05/29/24 1054   Pulse 52 05/29/24 1056   Resp 19 05/29/24 1054   SpO2 95 % 05/29/24 1056   Vitals shown include unfiled device data.        Post Anesthesia Care and Evaluation    Patient location during evaluation: bedside  Patient participation: complete - patient participated  Level of consciousness: awake  Pain management: adequate    Airway patency: patent  Anesthetic complications: No anesthetic complications  PONV Status: controlled  Cardiovascular status: acceptable and stable  Respiratory status: acceptable

## 2024-05-29 NOTE — H&P
Pre Procedure History & Physical    Chief Complaint:   Persistent heartburn    Subjective     HPI:   Heartburn    Past Medical History:   Past Medical History:   Diagnosis Date    Allergies     Esophageal reflux     HLD (hyperlipidemia)     HTN (hypertension)     Hypothyroid     CHERRY (obstructive sleep apnea) 02/14/2019    Throat cancer     Tonsil cancer     SEE DR ELY WILKS AT [facility]. DR MCKINLEY RIGGS AND DR RAHUL GUEVARA FOR CANCER REHAB SQUAMOUS CELL CARCINOMA       Past Surgical History:  Past Surgical History:   Procedure Laterality Date    APPENDECTOMY      COLONOSCOPY  05/02/2018    GASTROSTOMY FEEDING TUBE INSERTION      KNEE SURGERY      NOSE SURGERY      CARTLIAGE GRAFT AUTOGENOUS, TO NOSE FROM RIB    PORTACATH PLACEMENT      SKIN CANCER EXCISION      LEFT EAR    UPPER GASTROINTESTINAL ENDOSCOPY         Family History:  Family History   Problem Relation Age of Onset    Stroke Other     Alzheimer's disease Other     Heart disease Other     Diabetes Other     Colon cancer Neg Hx        Social History:   reports that he quit smoking about 31 years ago. His smoking use included cigarettes. He started smoking about 36 years ago. He has a 10 pack-year smoking history. He has been exposed to tobacco smoke. He has never used smokeless tobacco. He reports that he does not currently use alcohol. Drug use questions deferred to the physician.    Medications:   Medications Prior to Admission   Medication Sig Dispense Refill Last Dose    Aspirin Low Dose 81 MG EC tablet        cetirizine (zyrTEC) 10 MG tablet        cholecalciferol (VITAMIN D3) 25 MCG (1000 UT) tablet        Cyanocobalamin ER 1000 MCG tablet controlled-release Take  by mouth Daily.       esomeprazole (nexIUM) 40 MG capsule Nexium 40 mg oral capsule,delayed release(DR/EC) take 1 capsule (40 mg) by oral route once daily   Suspended       famotidine (Pepcid) 20 MG tablet Take 1 tablet by mouth 2 (Two) Times a Day. 180 tablet 1     fluticasone  (Flonase) 50 MCG/ACT nasal spray 2 sprays into the nostril(s) as directed by provider Daily. 16 g 3     hydroCHLOROthiazide (HYDRODIURIL) 25 MG tablet        levothyroxine (SYNTHROID, LEVOTHROID) 75 MCG tablet Synthroid 75 mcg oral tablet take 1 tablet (75 mcg) by oral route once daily   Active       multivitamin (THERAGRAN) tablet tablet Take  by mouth Daily.       Omega-3 Fatty Acids (fish oil) 1000 MG capsule capsule Take  by mouth Daily.       simvastatin (ZOCOR) 40 MG tablet        Sod Fluoride-Potassium Nitrate (PreviDent 5000 Sensitive) 1.1-5 % paste Prevident 5000 Enamel Protect 1.1-5 % dental paste Brush before bed and do not rinse   Active          Allergies:  Patient has no known allergies.        Objective     Blood pressure 147/70, pulse 59, temperature 97.3 °F (36.3 °C), temperature source Temporal, resp. rate 14, weight 98.4 kg (216 lb 14.9 oz), SpO2 96%.    Physical Exam   Constitutional: Pt is oriented to person, place, and time and well-developed, well-nourished, and in no distress.   Mouth/Throat: Oropharynx is clear and moist.   Neck: Normal range of motion.   Cardiovascular: Normal rate, regular rhythm and normal heart sounds.    Pulmonary/Chest: Effort normal and breath sounds normal.   Abdominal: Soft. Nontender  Skin: Skin is warm and dry.   Psychiatric: Mood, memory, affect and judgment normal.     Assessment & Plan     Diagnosis:  Persistent heartburn    Anticipated Surgical Procedure:  EGD    The risks, benefits, and alternatives of this procedure have been discussed with the patient or the responsible party- the patient understands and agrees to proceed.

## 2024-05-30 LAB
CYTO UR: NORMAL
LAB AP CASE REPORT: NORMAL
LAB AP CLINICAL INFORMATION: NORMAL
PATH REPORT.FINAL DX SPEC: NORMAL
PATH REPORT.GROSS SPEC: NORMAL

## 2024-07-05 ENCOUNTER — APPOINTMENT (OUTPATIENT)
Dept: GENERAL RADIOLOGY | Facility: HOSPITAL | Age: 64
End: 2024-07-05
Payer: MEDICARE

## 2024-07-05 ENCOUNTER — HOSPITAL ENCOUNTER (EMERGENCY)
Facility: HOSPITAL | Age: 64
Discharge: HOME | End: 2024-07-05
Attending: EMERGENCY MEDICINE
Payer: MEDICARE

## 2024-07-05 VITALS
HEIGHT: 69 IN | WEIGHT: 204.37 LBS | SYSTOLIC BLOOD PRESSURE: 161 MMHG | HEART RATE: 70 BPM | TEMPERATURE: 98.5 F | OXYGEN SATURATION: 96 % | RESPIRATION RATE: 16 BRPM | BODY MASS INDEX: 30.27 KG/M2 | DIASTOLIC BLOOD PRESSURE: 81 MMHG

## 2024-07-05 DIAGNOSIS — S93.314A CLOSED TRAUMATIC DISLOCATION OF RIGHT SUBTALAR JOINT, INITIAL ENCOUNTER: Primary | ICD-10-CM

## 2024-07-05 PROCEDURE — 25810000003 SODIUM CHLORIDE 0.9 % SOLUTION: Performed by: EMERGENCY MEDICINE

## 2024-07-05 PROCEDURE — 73610 X-RAY EXAM OF ANKLE: CPT

## 2024-07-05 PROCEDURE — 73630 X-RAY EXAM OF FOOT: CPT

## 2024-07-05 PROCEDURE — 96374 THER/PROPH/DIAG INJ IV PUSH: CPT

## 2024-07-05 PROCEDURE — 25010000002 HYDROMORPHONE 1 MG/ML SOLUTION: Performed by: EMERGENCY MEDICINE

## 2024-07-05 PROCEDURE — 25010000002 ONDANSETRON PER 1 MG: Performed by: EMERGENCY MEDICINE

## 2024-07-05 PROCEDURE — 96375 TX/PRO/DX INJ NEW DRUG ADDON: CPT

## 2024-07-05 PROCEDURE — 99285 EMERGENCY DEPT VISIT HI MDM: CPT

## 2024-07-05 PROCEDURE — 25010000002 PROPOFOL 10 MG/ML EMULSION: Performed by: EMERGENCY MEDICINE

## 2024-07-05 PROCEDURE — 71045 X-RAY EXAM CHEST 1 VIEW: CPT

## 2024-07-05 RX ORDER — SODIUM CHLORIDE 0.9 % (FLUSH) 0.9 %
10 SYRINGE (ML) INJECTION AS NEEDED
Status: DISCONTINUED | OUTPATIENT
Start: 2024-07-05 | End: 2024-07-05 | Stop reason: HOSPADM

## 2024-07-05 RX ORDER — HYDROCODONE BITARTRATE AND ACETAMINOPHEN 5; 325 MG/1; MG/1
1 TABLET ORAL ONCE
Status: COMPLETED | OUTPATIENT
Start: 2024-07-05 | End: 2024-07-05

## 2024-07-05 RX ORDER — PROPOFOL 10 MG/ML
100 VIAL (ML) INTRAVENOUS ONCE
Status: COMPLETED | OUTPATIENT
Start: 2024-07-05 | End: 2024-07-05

## 2024-07-05 RX ORDER — HYDROCODONE BITARTRATE AND ACETAMINOPHEN 5; 325 MG/1; MG/1
1 TABLET ORAL EVERY 8 HOURS PRN
Qty: 9 TABLET | Refills: 0 | Status: SHIPPED | OUTPATIENT
Start: 2024-07-05

## 2024-07-05 RX ORDER — ONDANSETRON 2 MG/ML
4 INJECTION INTRAMUSCULAR; INTRAVENOUS ONCE
Status: COMPLETED | OUTPATIENT
Start: 2024-07-05 | End: 2024-07-05

## 2024-07-05 RX ADMIN — SODIUM CHLORIDE 500 ML: 9 INJECTION, SOLUTION INTRAVENOUS at 19:12

## 2024-07-05 RX ADMIN — PROPOFOL 80 MG: 10 INJECTION, EMULSION INTRAVENOUS at 19:11

## 2024-07-05 RX ADMIN — HYDROMORPHONE HYDROCHLORIDE 0.5 MG: 1 INJECTION, SOLUTION INTRAMUSCULAR; INTRAVENOUS; SUBCUTANEOUS at 18:40

## 2024-07-05 RX ADMIN — ONDANSETRON 4 MG: 2 INJECTION INTRAMUSCULAR; INTRAVENOUS at 18:40

## 2024-07-05 RX ADMIN — HYDROCODONE BITARTRATE AND ACETAMINOPHEN 1 TABLET: 5; 325 TABLET ORAL at 19:51

## 2024-07-05 NOTE — ED PROVIDER NOTES
Time: 6:35 PM EDT  Date of encounter:  7/5/2024  Independent Historian/Clinical History and Information was obtained by:   Patient    History is limited by: N/A    Chief Complaint: Fall, right foot and ankle pain      History of Present Illness:  Patient is a 63 y.o. year old male who presents to the emergency department for evaluation of fall, right foot and ankle pain.  Patient states he was up at the top of the ladder painting near his home's roof.  The ladder slipped out from under him and he landed on his right foot/ankle.  Denies any other injury.  Has no neck pain, rib flank chest or abdominal pain.  Denies hitting his head.  No headache, LOC.  No neck pain.  Patient consistently states his only concern is his right foot/ankle.  Denies numbness tingling and states it feels as though his Achilles tendon is still functional.    HPI    Patient Care Team  Primary Care Provider: Minh Shay DO    Past Medical History:     No Known Allergies  Past Medical History:   Diagnosis Date    Allergies     Esophageal reflux     HLD (hyperlipidemia)     HTN (hypertension)     Hypothyroid     CHERRY (obstructive sleep apnea) 02/14/2019    Throat cancer     Tonsil cancer     SEE DR ELY WILKS AT [facility]. DR MCKINLEY RIGGS AND DR RAHUL GUEVARA FOR CANCER REHAB SQUAMOUS CELL CARCINOMA     Past Surgical History:   Procedure Laterality Date    APPENDECTOMY      COLONOSCOPY  05/02/2018    ENDOSCOPY N/A 5/29/2024    Procedure: ESOPHAGOGASTRODUODENOSCOPY WITH BIOPSIES;  Surgeon: Ryan Woods MD;  Location: [facility] ENDOSCOPY;  Service: Gastroenterology;  Laterality: N/A;  GASTRIC POLYP/HIATAL HERNIA    GASTROSTOMY FEEDING TUBE INSERTION      KNEE SURGERY      NOSE SURGERY      CARTLIAGE GRAFT AUTOGENOUS, TO NOSE FROM RIB    PORTACATH PLACEMENT      SKIN CANCER EXCISION      LEFT EAR    UPPER GASTROINTESTINAL ENDOSCOPY       Family History   Problem Relation Age of Onset    Stroke Other     Alzheimer's disease  Other     Heart disease Other     Diabetes Other     Colon cancer Neg Hx        Home Medications:  Prior to Admission medications    Medication Sig Start Date End Date Taking? Authorizing Provider   Aspirin Low Dose 81 MG EC tablet  7/26/21   Shravan Barahona MD   cetirizine (zyrTEC) 10 MG tablet  7/3/21   Shravan Barahona MD   cholecalciferol (VITAMIN D3) 25 MCG (1000 UT) tablet  10/26/21   Shravan Barahona MD   Cyanocobalamin ER 1000 MCG tablet controlled-release Take  by mouth Daily.    Shravan Barahona MD   fluticasone (Flonase) 50 MCG/ACT nasal spray 2 sprays into the nostril(s) as directed by provider Daily. 3/29/24   Aria Iyer APRN   hydroCHLOROthiazide (HYDRODIURIL) 25 MG tablet  7/26/21   Shravan Barahona MD   levothyroxine (SYNTHROID, LEVOTHROID) 75 MCG tablet Synthroid 75 mcg oral tablet take 1 tablet (75 mcg) by oral route once daily   Active    Shravan Barahona MD   multivitamin (THERAGRAN) tablet tablet Take  by mouth Daily.    Shravan Barahona MD   Omega-3 Fatty Acids (fish oil) 1000 MG capsule capsule Take  by mouth Daily.    Shravan Barahona MD   oseltamivir (TAMIFLU) 75 MG capsule Take 1 capsule by mouth 2 (Two) Times a Day for 5 days. 7/2/24 7/7/24  Gina Wilkinson APRN   pantoprazole (PROTONIX) 40 MG EC tablet Take 1 tablet by mouth Daily. 5/29/24   Ryan Woods MD   promethazine-dextromethorphan (PROMETHAZINE-DM) 6.25-15 MG/5ML syrup Take 5 mL by mouth 4 (Four) Times a Day As Needed for Cough. 7/2/24   Gina Wilkinson APRN   simvastatin (ZOCOR) 40 MG tablet  7/3/21   Shravan Barahona MD   Sod Fluoride-Potassium Nitrate (PreviDent 5000 Sensitive) 1.1-5 % paste Prevident 5000 Enamel Protect 1.1-5 % dental paste Brush before bed and do not rinse   Active    Shravan Barahona MD        Social History:   Social History     Tobacco Use    Smoking status: Former     Current packs/day: 0.00     Average packs/day: 2.0  "packs/day for 5.0 years (10.0 ttl pk-yrs)     Types: Cigarettes     Start date:      Quit date:      Years since quittin.5     Passive exposure: Past    Smokeless tobacco: Never    Tobacco comments:     AGE [age]   Vaping Use    Vaping status: Never Used   Substance Use Topics    Alcohol use: Not Currently     Comment: RARE SOCIALLY    Drug use: Defer         Review of Systems:  Review of Systems   Constitutional:  Negative for chills and fever.   HENT:  Negative for congestion, rhinorrhea and sore throat.    Eyes:  Negative for photophobia.   Respiratory:  Negative for apnea, cough, chest tightness and shortness of breath.    Cardiovascular:  Negative for chest pain and palpitations.   Gastrointestinal:  Negative for abdominal pain, diarrhea, nausea and vomiting.   Endocrine: Negative.    Genitourinary:  Negative for difficulty urinating and dysuria.   Musculoskeletal:  Positive for arthralgias. Negative for back pain, joint swelling and myalgias.   Skin:  Negative for color change and wound.   Allergic/Immunologic: Negative.    Neurological:  Negative for seizures and headaches.   Psychiatric/Behavioral: Negative.     All other systems reviewed and are negative.       Physical Exam:  BP (!) 124/102   Pulse 85   Temp 98.5 °F (36.9 °C) (Oral)   Resp 16   Ht 175.3 cm (69\")   Wt 92.7 kg (204 lb 5.9 oz)   SpO2 91%   BMI 30.18 kg/m²     Physical Exam  Vitals and nursing note reviewed.   Constitutional:       General: He is awake.      Appearance: Normal appearance.   HENT:      Head: Normocephalic and atraumatic.      Nose: Nose normal.      Mouth/Throat:      Mouth: Mucous membranes are moist.   Eyes:      Extraocular Movements: Extraocular movements intact.      Pupils: Pupils are equal, round, and reactive to light.   Cardiovascular:      Rate and Rhythm: Normal rate and regular rhythm.      Heart sounds: Normal heart sounds.   Pulmonary:      Effort: Pulmonary effort is normal. No respiratory " "distress.      Breath sounds: Normal breath sounds. No wheezing, rhonchi or rales.   Abdominal:      General: Bowel sounds are normal.      Palpations: Abdomen is soft.      Tenderness: There is no abdominal tenderness. There is no guarding or rebound.      Comments: No rigidity   Musculoskeletal:         General: Swelling, tenderness and deformity present.      Cervical back: Normal range of motion and neck supple.      Comments: Right foot/ankle obvious deformity with lateral dislocation.  Patient is neurovascularly intact with intact Achilles function.  No laceration.   Skin:     General: Skin is warm and dry.      Coloration: Skin is not jaundiced.   Neurological:      General: No focal deficit present.      Mental Status: He is alert. Mental status is at baseline.   Psychiatric:         Mood and Affect: Mood normal.                  Procedures:  Lower Extremity Dislocation    Date/Time: 7/5/2024 7:10 PM    Performed by: Roby Ruiz MD  Authorized by: Roby Ruiz MD  Consent: Verbal consent obtained. Written consent obtained.  Consent given by: patient  Patient understanding: patient states understanding of the procedure being performed  Patient consent: the patient's understanding of the procedure matches consent given  Procedure consent: procedure consent matches procedure scheduled  Test results: test results available and properly labeled  Site marked: the operative site was marked  Imaging studies: imaging studies available  Patient identity confirmed: verbally with patient  Time out: Immediately prior to procedure a \"time out\" was called to verify the correct patient, procedure, equipment, support staff and site/side marked as required.  Injury location: foot  Location details: right foot  Injury type: dislocation  Dislocation type: tarsometatarsal  Pre-procedure neurovascular assessment: neurovascularly intact  Pre-procedure distal perfusion: normal  Pre-procedure neurological function: " normal  Pre-procedure range of motion: reduced    Anesthesia:  Local anesthesia used: no    Sedation:  Patient sedated: yes  Sedation type: moderate (conscious) sedation  Sedatives: propofol  Analgesia: Hydromorphone.  Vitals: Vital signs were monitored during sedation.    Manipulation performed: yes  Reduction method: direct traction and traction and counter traction  Reduction successful: yes  X-ray confirmed reduction: yes  Immobilization: splint and crutches  Splint type: ankle stirrup  Supplies used: Ortho-Glass  Post-procedure neurovascular assessment: post-procedure neurovascularly intact  Post-procedure distal perfusion: normal  Post-procedure neurological function: normal  Post-procedure range of motion: improved  Patient tolerance: patient tolerated the procedure well with no immediate complications            Medical Decision Making:      Comorbidities that affect care:    Sleep apnea, hyperlipidemia, hypertension, esophageal cancer    External Notes reviewed:    Previous Operation Note: Surgery 5/29/2024.  Description: Dr. Woods, gastroenterology, EGD with biopsies.      The following orders were placed and all results were independently analyzed by me:  Orders Placed This Encounter   Procedures    Orthopedic Injury Treatment    [facility] 11.  Crutches; Prevents Accomplishing MRADLs; Able to Safely Use Equipment; Mobility Deficit Can Be Sufficiently Resolved By Use of Equipment    XR Foot 3+ View Right    XR Ankle 3+ View Right    XR Chest 1 View    XR Ankle 3+ View Right    Ambulatory Referral to Podiatry    Set up for procedural sedation.  Hold propofol for sedation  Nursing Communication    Continuous Pulse Oximetry    Crutches (fit & training)    Oxygen Therapy- Nasal Cannula; Titrate 1-6 LPM Per SpO2; 90 - 95%    Insert Peripheral IV       Medications Given in the Emergency Department:  Medications   sodium chloride 0.9 % flush 10 mL (has no administration in time range)   sodium  chloride 0.9 % bolus 500 mL (0 mL Intravenous Stopped 7/5/24 1952)   HYDROmorphone (DILAUDID) injection 0.5 mg (0.5 mg Intravenous Given 7/5/24 1840)   ondansetron (ZOFRAN) injection 4 mg (4 mg Intravenous Given 7/5/24 1840)   Propofol (DIPRIVAN) injection 100 mg (80 mg Intravenous Given 7/5/24 1911)   HYDROcodone-acetaminophen (NORCO) 5-325 MG per tablet 1 tablet (1 tablet Oral Given 7/5/24 1951)        ED Course:         Labs:    Lab Results (last 24 hours)       ** No results found for the last 24 hours. **             Imaging:    XR Ankle 3+ View Right    Result Date: 7/5/2024  XR ANKLE 3+ VW RIGHT Date of Exam: 7/5/2024 7:09 PM EDT Indication: Postreduction, pain Comparison: Earlier right foot and ankle x-rays today. Findings: Splint artifact obscures evaluation of fine bony detail. There is improved, now near-anatomic alignment of the previously seen subtalar dislocation following reduction. No definite fractures are identified. There is associated soft tissue swelling.     Impression: Improved alignment status post reduction. Electronically Signed: Karin Ramey  7/5/2024 7:29 PM EDT  Workstation ID: [id number]    XR Foot 3+ View Right    Result Date: 7/5/2024  XR FOOT 3+ VW RIGHT, XR ANKLE 3+ VW RIGHT Date of Exam: 7/5/2024 6:21 PM EDT Indication: visible deformity Comparison: None available. Findings: There is soft tissue swelling and deformity at the ankle. No soft tissue gas or radiopaque foreign body. There is medial subtalar dislocation. A couple questionable tiny avulsion fracture fragments are identified at the dislocated talonavicular joint. The ankle mortise joint space is symmetric and preserved. Incidental note of a 1 mm linear metallic foreign body in the dorsal soft tissues of the tip of the great toe.     Impression: Medial subtalar dislocation. Electronically Signed: Kiet Daly MD  7/5/2024 6:34 PM EDT  Workstation ID: [id number]    XR Ankle 3+ View Right    Result Date: 7/5/2024  XR FOOT 3+  VW RIGHT, XR ANKLE 3+ VW RIGHT Date of Exam: 7/5/2024 6:21 PM EDT Indication: visible deformity Comparison: None available. Findings: There is soft tissue swelling and deformity at the ankle. No soft tissue gas or radiopaque foreign body. There is medial subtalar dislocation. A couple questionable tiny avulsion fracture fragments are identified at the dislocated talonavicular joint. The ankle mortise joint space is symmetric and preserved. Incidental note of a 1 mm linear metallic foreign body in the dorsal soft tissues of the tip of the great toe.     Impression: Medial subtalar dislocation. Electronically Signed: Kiet Daly MD  7/5/2024 6:34 PM EDT  Workstation ID: [id number]    XR Chest 1 View    Result Date: 7/5/2024  XR CHEST 1 VW Date of Exam: 7/5/2024 6:22 PM EDT Indication: fall Comparison: Two-view chest x-ray 2/16/2024 Findings: Lungs are well expanded and appear clear. No pneumothorax or large pleural effusion is seen. Cardiomediastinal contours appear stable.     Impression: No acute cardiopulmonary abnormality is identified. Electronically Signed: Karin Ramey  7/5/2024 6:29 PM EDT  Workstation ID: [id number]       Differential Diagnosis and Discussion:    Extremity Pain: Differential diagnosis includes but is not limited to soft tissue sprain, tendonitis, tendon injury, dislocation, fracture, deep vein thrombosis, arterial insufficiency, osteoarthritis, bursitis, and ligamentous damage.    All X-rays impressions were independently interpreted by me.    [facility]               Patient Care Considerations:    LABS: I considered ordering labs, however patient has no systemic complaints.  He has no trunk trauma to warrant trauma labs.      Consultants/Shared Management Plan:    Consultant: I have discussed the case with orthopedics on-call, Dr. Stone who states patient can follow-up outpatient.    Social Determinants of Health:    Patient is independent, reliable, and has access to care.       Disposition and  Care Coordination:    Discharged: The patient is suitable and stable for discharge with no need for consideration of admission.    I have explained the patient´s condition, diagnoses and treatment plan based on the information available to me at this time. I have answered questions and addressed any concerns. The patient has a good  understanding of the patient´s diagnosis, condition, and treatment plan as can be expected at this point. The vital signs have been stable. The patient´s condition is stable and appropriate for discharge from the emergency department.      The patient will pursue further outpatient evaluation with the primary care physician or other designated or consulting physician as outlined in the discharge instructions. They are agreeable to this plan of care and follow-up instructions have been explained in detail. The patient has received these instructions in written format and has expressed an understanding of the discharge instructions. The patient is aware that any significant change in condition or worsening of symptoms should prompt an immediate return to this or the closest emergency department or call to 911.    Final diagnoses:   Closed traumatic dislocation of right subtalar joint, initial encounter        ED Disposition       ED Disposition   Discharge    Condition   Stable    Comment   --               This medical record created using voice recognition software.             Roby Ruiz MD  07/05/24 2016

## 2024-07-06 NOTE — DISCHARGE INSTRUCTIONS
Do not bear any weight on your right leg until cleared by either orthopedic surgery or podiatry.  You may choose who to follow-up with.  I did place an ambulatory referral to podiatry so you could receive a call in the next 2-3 business days to schedule an appointment.  Otherwise, you can follow-up with your orthopedic surgeon if you prefer.

## 2024-07-08 ENCOUNTER — TELEPHONE (OUTPATIENT)
Dept: ORTHOPEDIC SURGERY | Facility: CLINIC | Age: 64
End: 2024-07-08
Payer: OTHER GOVERNMENT

## 2024-07-09 ENCOUNTER — OFFICE VISIT (OUTPATIENT)
Dept: ORTHOPEDIC SURGERY | Facility: CLINIC | Age: 64
End: 2024-07-09
Payer: MEDICARE

## 2024-07-09 VITALS
WEIGHT: 212 LBS | DIASTOLIC BLOOD PRESSURE: 91 MMHG | HEIGHT: 69 IN | HEART RATE: 106 BPM | BODY MASS INDEX: 31.4 KG/M2 | SYSTOLIC BLOOD PRESSURE: 161 MMHG | OXYGEN SATURATION: 95 %

## 2024-07-09 DIAGNOSIS — S93.314A: Primary | ICD-10-CM

## 2024-07-09 PROCEDURE — 3080F DIAST BP >= 90 MM HG: CPT | Performed by: ORTHOPAEDIC SURGERY

## 2024-07-09 PROCEDURE — 3077F SYST BP >= 140 MM HG: CPT | Performed by: ORTHOPAEDIC SURGERY

## 2024-07-09 PROCEDURE — 99213 OFFICE O/P EST LOW 20 MIN: CPT | Performed by: ORTHOPAEDIC SURGERY

## 2024-07-09 PROCEDURE — 27842 TREAT ANKLE DISLOCATION: CPT | Performed by: ORTHOPAEDIC SURGERY

## 2024-07-09 RX ORDER — HYDROCODONE BITARTRATE AND ACETAMINOPHEN 5; 325 MG/1; MG/1
1 TABLET ORAL EVERY 6 HOURS PRN
Qty: 21 TABLET | Refills: 0 | Status: SHIPPED | OUTPATIENT
Start: 2024-07-09

## 2024-07-09 NOTE — PROGRESS NOTES
"Chief Complaint  Initial Evaluation and Pain of the Right Ankle     Subjective      Rico Armstrong presents to [facility] for initial evaluation of the right ankle.  Patient states he was up at the top of the ladder painting near his home's roof. The ladder slipped out from under him and he landed on his right foot/ankle. He went to the ED on 24 and had X rays and placed in short leg splint. He was reduced at the hospital. He is here to review.      No Known Allergies     Social History     Socioeconomic History    Marital status:    Tobacco Use    Smoking status: Former     Current packs/day: 0.00     Average packs/day: 2.0 packs/day for 5.0 years (10.0 ttl pk-yrs)     Types: Cigarettes     Start date:      Quit date:      Years since quittin.5     Passive exposure: Past    Smokeless tobacco: Never    Tobacco comments:     [age]   Vaping Use    Vaping status: Never Used   Substance and Sexual Activity    Alcohol use: Not Currently     Comment: RARE SOCIALLY    Drug use: Defer    Sexual activity: Defer        I reviewed the patient's chief complaint, history of present illness, review of systems, past medical history, surgical history, family history, social history, medications, and allergy list.     Review of Systems     Constitutional: Denies fevers, chills, weight loss  Cardiovascular: Denies chest pain, shortness of breath  Skin: Denies rashes, acute skin changes  Neurologic: Denies headache, loss of consciousness        Vital Signs:   /91   Pulse 106   Ht 175.3 cm (69\")   Wt 96.2 kg (212 lb)   SpO2 95%   BMI 31.31 kg/m²          Physical Exam  General: Alert. No acute distress    Ortho Exam        RIGHT ANKLE He is in a splint. Positive EHL, FHL, GS and TA. Sensation intact to all 5 nerves of the foot. Positive pulses. Neurovascularly intact. Calf soft, Non-tender.  Intact flexion and extension of toes.  When splint removed a big " fracture blister on the top of the foot.  Adaptic was applied to the fracture blister.       Orthopedic Injury Treatment    Date/Time: 7/9/2024 9:02 AM    Performed by: Kelechi Zaidi  Authorized by: Misbah Stone MD  Injury location: ankle  Location details: right ankle  Pre-procedure neurovascular assessment: neurovascularly intact    Anesthesia:  Local anesthesia used: no    Sedation:  Patient sedated: no    Immobilization: cast  Splint type: short leg  Supplies used: cotton padding (FIBERGLASS)  Post-procedure neurovascular assessment: post-procedure neurovascularly intact  Patient tolerance: patient tolerated the procedure well with no immediate complications  Comments: Closed treatment was obtained and fiberglass cast was applied.  The patient tolerated the procedure without any complications.          Imaging Results (Most Recent)       None             Result Review :         XR Ankle 3+ View Right    Result Date: 7/5/2024  Narrative: XR ANKLE 3+ VW RIGHT Date of Exam: 7/5/2024 7:09 PM EDT Indication: Postreduction, pain Comparison: Earlier right foot and ankle x-rays today. Findings: Splint artifact obscures evaluation of fine bony detail. There is improved, now near-anatomic alignment of the previously seen subtalar dislocation following reduction. No definite fractures are identified. There is associated soft tissue swelling.     Impression: Impression: Improved alignment status post reduction. Electronically Signed: Karin Ramey  7/5/2024 7:29 PM EDT  Workstation ID: [id number]    XR Foot 3+ View Right    Result Date: 7/5/2024  Narrative: XR FOOT 3+ VW RIGHT, XR ANKLE 3+ VW RIGHT Date of Exam: 7/5/2024 6:21 PM EDT Indication: visible deformity Comparison: None available. Findings: There is soft tissue swelling and deformity at the ankle. No soft tissue gas or radiopaque foreign body. There is medial subtalar dislocation. A couple questionable tiny avulsion fracture fragments are identified at the dislocated  talonavicular joint. The ankle mortise joint space is symmetric and preserved. Incidental note of a 1 mm linear metallic foreign body in the dorsal soft tissues of the tip of the great toe.     Impression: Impression: Medial subtalar dislocation. Electronically Signed: Kiet Daly MD  7/5/2024 6:34 PM EDT  Workstation ID: [id number]    XR Ankle 3+ View Right    Result Date: 7/5/2024  Narrative: XR FOOT 3+ VW RIGHT, XR ANKLE 3+ VW RIGHT Date of Exam: 7/5/2024 6:21 PM EDT Indication: visible deformity Comparison: None available. Findings: There is soft tissue swelling and deformity at the ankle. No soft tissue gas or radiopaque foreign body. There is medial subtalar dislocation. A couple questionable tiny avulsion fracture fragments are identified at the dislocated talonavicular joint. The ankle mortise joint space is symmetric and preserved. Incidental note of a 1 mm linear metallic foreign body in the dorsal soft tissues of the tip of the great toe.     Impression: Impression: Medial subtalar dislocation. Electronically Signed: Kiet Daly MD  7/5/2024 6:34 PM EDT  Workstation ID: [id number]    XR Chest 1 View    Result Date: 7/5/2024  Narrative: XR CHEST 1 VW Date of Exam: 7/5/2024 6:22 PM EDT Indication: fall Comparison: Two-view chest x-ray 2/16/2024 Findings: Lungs are well expanded and appear clear. No pneumothorax or large pleural effusion is seen. Cardiomediastinal contours appear stable.     Impression: Impression: No acute cardiopulmonary abnormality is identified. Electronically Signed: Karin Ramey  7/5/2024 6:29 PM EDT  Workstation ID: [id number]            Assessment and Plan     Diagnoses and all orders for this visit:    1. Closed traumatic medial dislocation of right subtalar joint, initial encounter (Primary)    Other orders  -     Orthopedic Injury Treatment        Discussed the treatment plan with the patient. I reviewed the X-rays that were obtained 7/5/24 with the patient.     Fracture  blister on the top of the foot so put back into a splint.  Keep elevated.      NWB Refilled pain medication.  Prescribed knee scooter.     Will obtain X-Rays of the right ankle at next visit.    Call or return if worsening symptoms.    Follow Up     7-10 days.         Patient was given instructions and counseling regarding his condition or for health maintenance advice. Please see specific information pulled into the AVS if appropriate.     Scribed for Misbah Stone MD by Susana Goel MA.  07/09/24   08:34 EDT    I have personally performed the services described in this document as scribed by the above individual and it is both accurate and complete. Misbah Stone MD 07/09/24

## 2024-07-10 ENCOUNTER — HOSPITAL ENCOUNTER (OUTPATIENT)
Dept: CT IMAGING | Facility: HOSPITAL | Age: 64
Discharge: HOME | End: 2024-07-10
Admitting: FAMILY MEDICINE

## 2024-07-10 DIAGNOSIS — I25.10 CORONARY ARTERY DISEASE INVOLVING NATIVE CORONARY ARTERY OF NATIVE HEART WITHOUT ANGINA PECTORIS: ICD-10-CM

## 2024-07-10 DIAGNOSIS — Z82.49 FAMILY HISTORY OF CORONARY ARTERY DISEASE: ICD-10-CM

## 2024-07-10 DIAGNOSIS — Z82.49 FAMILY HISTORY OF CORONARY ARTERY DISEASE: Primary | ICD-10-CM

## 2024-07-10 DIAGNOSIS — I70.90 ATHEROSCLEROSIS: ICD-10-CM

## 2024-07-10 DIAGNOSIS — E78.00 PURE HYPERCHOLESTEROLEMIA: ICD-10-CM

## 2024-07-10 PROCEDURE — 75571 CT HRT W/O DYE W/CA TEST: CPT

## 2024-07-19 ENCOUNTER — OFFICE VISIT (OUTPATIENT)
Dept: ORTHOPEDIC SURGERY | Facility: CLINIC | Age: 64
End: 2024-07-19
Payer: MEDICARE

## 2024-07-19 DIAGNOSIS — S93.314D: Primary | ICD-10-CM

## 2024-07-19 PROCEDURE — 99024 POSTOP FOLLOW-UP VISIT: CPT | Performed by: ORTHOPAEDIC SURGERY

## 2024-07-19 PROCEDURE — 3079F DIAST BP 80-89 MM HG: CPT | Performed by: ORTHOPAEDIC SURGERY

## 2024-07-19 PROCEDURE — 3077F SYST BP >= 140 MM HG: CPT | Performed by: ORTHOPAEDIC SURGERY

## 2024-07-20 VITALS
BODY MASS INDEX: 31.4 KG/M2 | SYSTOLIC BLOOD PRESSURE: 152 MMHG | WEIGHT: 212 LBS | OXYGEN SATURATION: 95 % | HEART RATE: 96 BPM | HEIGHT: 69 IN | DIASTOLIC BLOOD PRESSURE: 80 MMHG

## 2024-07-22 NOTE — PROGRESS NOTES
"Chief Complaint  Follow-up of the Right Ankle     Subjective      Rico Armstrong presents to [facility] for follow up of the right ankle. He had an injury and slipped down the ladder on 24.  He had a right ankle dislocation.  His splint was removed today and has some swelling and is bruised.      No Known Allergies     Social History     Socioeconomic History    Marital status:    Tobacco Use    Smoking status: Former     Current packs/day: 0.00     Average packs/day: 2.0 packs/day for 5.0 years (10.0 ttl pk-yrs)     Types: Cigarettes     Start date:      Quit date:      Years since quittin.5     Passive exposure: Past    Smokeless tobacco: Never    Tobacco comments:     [age]   Vaping Use    Vaping status: Never Used   Substance and Sexual Activity    Alcohol use: Not Currently     Comment: RARE SOCIALLY    Drug use: Defer    Sexual activity: Defer        I reviewed the patient's chief complaint, history of present illness, review of systems, past medical history, surgical history, family history, social history, medications, and allergy list.     Review of Systems     Constitutional: Denies fevers, chills, weight loss  Cardiovascular: Denies chest pain, shortness of breath  Skin: Denies rashes, acute skin changes  Neurologic: Denies headache, loss of consciousness        Vital Signs:   /80   Pulse 96   Ht 175.3 cm (69\")   Wt 96.2 kg (212 lb)   SpO2 95%   BMI 31.31 kg/m²          Physical Exam  General: Alert. No acute distress    Ortho Exam      RIGHT ANKLE He is in a splint.Splint removed today.  Positive EHL, FHL, GS and TA. Sensation intact to all 5 nerves of the foot. Positive pulses. Neurovascularly intact. Calf soft, Non-tender.  Intact flexion and extension of toes.  Mild swelling and fracture blister noted.      Procedures      Imaging Results (Most Recent)       None             Result Review :         X-Ray Report:  Right " ankle(s) X-Ray  Indication: Evaluation of the right ankle  AP/Lateral view(s)  Findings: Routine alignment of the subtalar  joint.   Prior studies available for comparison: yes              Assessment and Plan     Diagnoses and all orders for this visit:    1. Closed traumatic medial dislocation of right subtalar joint, subsequent encounter (Primary)  -     XR Ankle 3+ View Right        Discussed the treatment plan with the patient. I reviewed the X-rays that were obtained today with the patient.    CAM boot with partial weight bearing with crutches.      Will Xray the right foot at the next visit.       Call or return if worsening symptoms.    Follow Up     3-4 weeks with new X ray.        Patient was given instructions and counseling regarding his condition or for health maintenance advice. Please see specific information pulled into the AVS if appropriate.     Scribed for Misbah Stone MD by Susana Goel MA.  07/22/24   06:33 EDT    I have personally performed the services described in this document as scribed by the above individual and it is both accurate and complete. Misbah Stone MD 07/22/24

## 2024-07-25 ENCOUNTER — TELEPHONE (OUTPATIENT)
Dept: ORTHOPEDIC SURGERY | Facility: CLINIC | Age: 64
End: 2024-07-25
Payer: OTHER GOVERNMENT

## 2024-07-25 NOTE — TELEPHONE ENCOUNTER
Hub staff attempted to follow warm transfer process and was unsuccessful     Caller: Rico Armstrong    Relationship to patient: Self    Best call back number: [phone]    Patient is needing: PATIENT'S FRACTURE BLISTER POPPED AND HE HAS BEEN PUTTING NEOSPORIN ON IT AND WOULD LIKE TO KNOW IF THAT IS THE BEST THING TO DO - PLEASE REACH OUT AND ADVISE

## 2024-07-25 NOTE — TELEPHONE ENCOUNTER
Called and left message for patient that per Dr. Stone he can use the Neosporin for the first few days and then after that point go to nothing so that it will dry up and heal.  If patient calls back okay to relay message.

## 2024-07-25 NOTE — TELEPHONE ENCOUNTER
Provider: SHADE    Caller:  KERRI LONG    Relationship to Patient: PATIENT    Pharmacy: NA    Phone Number: [phone] (home)       Reason for Call: PATIENT WAS TO MAKE A 3 WEEK FOLLOW UP WITH DR LAGUERRE (FROM 7.19.24) AND HUB FIRST AVAILABLE IS 8.13.24  PATIENT HAS BEEN SCHEDULED FIRST AVAILABLE    When was the patient last seen: 7.19.24

## 2024-08-01 ENCOUNTER — CLINICAL SUPPORT (OUTPATIENT)
Dept: FAMILY MEDICINE CLINIC | Facility: CLINIC | Age: 64
End: 2024-08-01
Payer: MEDICARE

## 2024-08-01 DIAGNOSIS — E78.00 PURE HYPERCHOLESTEROLEMIA: ICD-10-CM

## 2024-08-01 DIAGNOSIS — E55.9 VITAMIN D DEFICIENCY: ICD-10-CM

## 2024-08-01 DIAGNOSIS — E03.9 HYPOTHYROIDISM, UNSPECIFIED TYPE: ICD-10-CM

## 2024-08-01 DIAGNOSIS — Z51.81 MEDICATION MONITORING ENCOUNTER: ICD-10-CM

## 2024-08-01 DIAGNOSIS — E53.8 B12 DEFICIENCY: ICD-10-CM

## 2024-08-01 DIAGNOSIS — I10 ESSENTIAL HYPERTENSION: ICD-10-CM

## 2024-08-01 LAB
25(OH)D3 SERPL-MCNC: 59.4 NG/ML (ref 30–100)
ALBUMIN SERPL-MCNC: 4.5 G/DL (ref 3.5–5.2)
ALBUMIN/GLOB SERPL: 1.5 G/DL
ALP SERPL-CCNC: 62 U/L (ref 39–117)
ALT SERPL W P-5'-P-CCNC: 16 U/L (ref 1–41)
ANION GAP SERPL CALCULATED.3IONS-SCNC: 14.3 MMOL/L (ref 5–15)
AST SERPL-CCNC: 24 U/L (ref 1–40)
BASOPHILS # BLD AUTO: 0.07 10*3/MM3 (ref 0–0.2)
BASOPHILS NFR BLD AUTO: 1 % (ref 0–1.5)
BILIRUB SERPL-MCNC: 0.4 MG/DL (ref 0–1.2)
BUN SERPL-MCNC: 18 MG/DL (ref 8–23)
BUN/CREAT SERPL: 18.4 (ref 7–25)
CALCIUM SPEC-SCNC: 9.7 MG/DL (ref 8.6–10.5)
CHLORIDE SERPL-SCNC: 99 MMOL/L (ref 98–107)
CHOLEST SERPL-MCNC: 211 MG/DL (ref 0–200)
CO2 SERPL-SCNC: 24.7 MMOL/L (ref 22–29)
CREAT SERPL-MCNC: 0.98 MG/DL (ref 0.76–1.27)
DEPRECATED RDW RBC AUTO: 41.9 FL (ref 37–54)
EGFRCR SERPLBLD CKD-EPI 2021: 85.6 ML/MIN/1.73
EOSINOPHIL # BLD AUTO: 0.35 10*3/MM3 (ref 0–0.4)
EOSINOPHIL NFR BLD AUTO: 5.2 % (ref 0.3–6.2)
ERYTHROCYTE [DISTWIDTH] IN BLOOD BY AUTOMATED COUNT: 13.1 % (ref 12.3–15.4)
GLOBULIN UR ELPH-MCNC: 3 GM/DL
GLUCOSE SERPL-MCNC: 103 MG/DL (ref 65–99)
HCT VFR BLD AUTO: 45.6 % (ref 37.5–51)
HDLC SERPL-MCNC: 50 MG/DL (ref 40–60)
HGB BLD-MCNC: 14.8 G/DL (ref 13–17.7)
IMM GRANULOCYTES # BLD AUTO: 0.02 10*3/MM3 (ref 0–0.05)
IMM GRANULOCYTES NFR BLD AUTO: 0.3 % (ref 0–0.5)
LDLC SERPL CALC-MCNC: 131 MG/DL (ref 0–100)
LDLC/HDLC SERPL: 2.55 {RATIO}
LYMPHOCYTES # BLD AUTO: 2.49 10*3/MM3 (ref 0.7–3.1)
LYMPHOCYTES NFR BLD AUTO: 37.1 % (ref 19.6–45.3)
MCH RBC QN AUTO: 28.6 PG (ref 26.6–33)
MCHC RBC AUTO-ENTMCNC: 32.5 G/DL (ref 31.5–35.7)
MCV RBC AUTO: 88 FL (ref 79–97)
MONOCYTES # BLD AUTO: 0.55 10*3/MM3 (ref 0.1–0.9)
MONOCYTES NFR BLD AUTO: 8.2 % (ref 5–12)
NEUTROPHILS NFR BLD AUTO: 3.23 10*3/MM3 (ref 1.7–7)
NEUTROPHILS NFR BLD AUTO: 48.2 % (ref 42.7–76)
NRBC BLD AUTO-RTO: 0 /100 WBC (ref 0–0.2)
PLATELET # BLD AUTO: 291 10*3/MM3 (ref 140–450)
PMV BLD AUTO: 10 FL (ref 6–12)
POTASSIUM SERPL-SCNC: 4.1 MMOL/L (ref 3.5–5.2)
PROT SERPL-MCNC: 7.5 G/DL (ref 6–8.5)
RBC # BLD AUTO: 5.18 10*6/MM3 (ref 4.14–5.8)
SODIUM SERPL-SCNC: 138 MMOL/L (ref 136–145)
T4 FREE SERPL-MCNC: 1.19 NG/DL (ref 0.92–1.68)
TRIGL SERPL-MCNC: 167 MG/DL (ref 0–150)
TSH SERPL DL<=0.05 MIU/L-ACNC: 7.41 UIU/ML (ref 0.27–4.2)
VIT B12 BLD-MCNC: >2000 PG/ML (ref 211–946)
VLDLC SERPL-MCNC: 30 MG/DL (ref 5–40)
WBC NRBC COR # BLD AUTO: 6.71 10*3/MM3 (ref 3.4–10.8)

## 2024-08-01 PROCEDURE — 82607 VITAMIN B-12: CPT | Performed by: FAMILY MEDICINE

## 2024-08-01 PROCEDURE — 84439 ASSAY OF FREE THYROXINE: CPT | Performed by: FAMILY MEDICINE

## 2024-08-01 PROCEDURE — 80061 LIPID PANEL: CPT | Performed by: FAMILY MEDICINE

## 2024-08-01 PROCEDURE — 80053 COMPREHEN METABOLIC PANEL: CPT | Performed by: FAMILY MEDICINE

## 2024-08-01 PROCEDURE — 82306 VITAMIN D 25 HYDROXY: CPT | Performed by: FAMILY MEDICINE

## 2024-08-01 PROCEDURE — 84443 ASSAY THYROID STIM HORMONE: CPT | Performed by: FAMILY MEDICINE

## 2024-08-01 PROCEDURE — 85025 COMPLETE CBC W/AUTO DIFF WBC: CPT | Performed by: FAMILY MEDICINE

## 2024-08-01 PROCEDURE — 36415 COLL VENOUS BLD VENIPUNCTURE: CPT | Performed by: FAMILY MEDICINE

## 2024-08-05 ENCOUNTER — OFFICE VISIT (OUTPATIENT)
Dept: FAMILY MEDICINE CLINIC | Facility: CLINIC | Age: 64
End: 2024-08-05
Payer: MEDICARE

## 2024-08-05 VITALS
WEIGHT: 209 LBS | SYSTOLIC BLOOD PRESSURE: 118 MMHG | HEIGHT: 69 IN | BODY MASS INDEX: 30.96 KG/M2 | HEART RATE: 72 BPM | DIASTOLIC BLOOD PRESSURE: 74 MMHG | OXYGEN SATURATION: 97 % | TEMPERATURE: 97.6 F

## 2024-08-05 DIAGNOSIS — E55.9 VITAMIN D DEFICIENCY: ICD-10-CM

## 2024-08-05 DIAGNOSIS — I10 ESSENTIAL HYPERTENSION: ICD-10-CM

## 2024-08-05 DIAGNOSIS — Z51.81 MEDICATION MONITORING ENCOUNTER: ICD-10-CM

## 2024-08-05 DIAGNOSIS — E53.8 B12 DEFICIENCY: ICD-10-CM

## 2024-08-05 DIAGNOSIS — Z82.49 FAMILY HISTORY OF CORONARY ARTERY DISEASE: ICD-10-CM

## 2024-08-05 DIAGNOSIS — M25.571 ACUTE RIGHT ANKLE PAIN: Primary | ICD-10-CM

## 2024-08-05 DIAGNOSIS — E03.9 HYPOTHYROIDISM, UNSPECIFIED TYPE: ICD-10-CM

## 2024-08-05 DIAGNOSIS — T14.8XXA ABRASION: ICD-10-CM

## 2024-08-05 DIAGNOSIS — I25.10 CORONARY ARTERY DISEASE INVOLVING NATIVE CORONARY ARTERY OF NATIVE HEART WITHOUT ANGINA PECTORIS: ICD-10-CM

## 2024-08-05 DIAGNOSIS — E78.2 MIXED HYPERLIPIDEMIA: ICD-10-CM

## 2024-08-05 DIAGNOSIS — Z12.5 SCREENING FOR PROSTATE CANCER: ICD-10-CM

## 2024-08-05 DIAGNOSIS — R73.03 PREDIABETES: ICD-10-CM

## 2024-08-05 PROCEDURE — 3078F DIAST BP <80 MM HG: CPT | Performed by: FAMILY MEDICINE

## 2024-08-05 PROCEDURE — 3074F SYST BP LT 130 MM HG: CPT | Performed by: FAMILY MEDICINE

## 2024-08-05 PROCEDURE — 1126F AMNT PAIN NOTED NONE PRSNT: CPT | Performed by: FAMILY MEDICINE

## 2024-08-05 PROCEDURE — 99214 OFFICE O/P EST MOD 30 MIN: CPT | Performed by: FAMILY MEDICINE

## 2024-08-05 RX ORDER — IBUPROFEN 200 MG
200 TABLET ORAL EVERY 6 HOURS PRN
COMMUNITY

## 2024-08-05 RX ORDER — LEVOTHYROXINE SODIUM 88 UG/1
88 TABLET ORAL DAILY
Qty: 90 TABLET | Refills: 3 | Status: SHIPPED | OUTPATIENT
Start: 2024-08-05

## 2024-08-05 RX ORDER — ATORVASTATIN CALCIUM 80 MG/1
80 TABLET, FILM COATED ORAL DAILY
Qty: 90 TABLET | Refills: 3 | Status: SHIPPED | OUTPATIENT
Start: 2024-08-05

## 2024-08-05 NOTE — PROGRESS NOTES
Chief Complaint  Right ankle pain    Subjective          Rico Jack Armstrong presents to [facility]  History of Present Illness  Patient presents today to follow-up for right ankle pain.  He had an injury where he came down with his ladder while doing painting work at his house.  He did see orthopedics.  His ankle swelling still present but he is improving.  He does have an abrasion on the lateral aspect of the right ankle.  He does have an appointment to follow-up again with orthopedics on 8/13/2024.  The original right ankle showed that he had a medial subtalar dislocation.  Patient does have coronary artery disease as noted on his most recent cardiac CT showing a Agatston score of 107 which is moderate.  He is taking aspirin.  His labs show that his LDL unfortunately is at 131.  I will increase his statin intensity and switch him from simvastatin 40 to start taking atorvastatin 80 mg daily.  TSH level is elevated and has trended upward.  He is currently taking 75 mcg of levothyroxine daily so I will increase this to take 88 mcg daily.  B12 and vitamin D levels are adequate.  We did discuss continuing current supplementation.  CMP showed normal renal function with normal LFTs.  CBC shows no anemia with normal white blood cell count and platelets.  I did discuss with patient checking A1c with the next lab work which will be in 6 months.      Current Outpatient Medications   Medication Instructions    Aspirin Low Dose 81 MG EC tablet No dose, route, or frequency recorded.    atorvastatin (LIPITOR) 80 mg, Oral, Daily    cetirizine (zyrTEC) 10 MG tablet No dose, route, or frequency recorded.    cholecalciferol (VITAMIN D3) 25 MCG (1000 UT) tablet No dose, route, or frequency recorded.    Cyanocobalamin ER 1000 MCG tablet controlled-release Oral, Daily    fluticasone (Flonase) 50 MCG/ACT nasal spray 2 sprays, Nasal, Daily    hydroCHLOROthiazide (HYDRODIURIL) 25 MG tablet No dose,  "route, or frequency recorded.    HYDROcodone-acetaminophen (NORCO) 5-325 MG per tablet 1 tablet, Oral, Every 8 Hours PRN    HYDROcodone-acetaminophen (NORCO) 5-325 MG per tablet 1 tablet, Oral, Every 6 Hours PRN    ibuprofen (ADVIL,MOTRIN) 200 mg, Oral, Every 6 Hours PRN    levothyroxine (SYNTHROID) 88 mcg, Oral, Daily    multivitamin (THERAGRAN) tablet tablet Oral, Daily    Omega-3 Fatty Acids (fish oil) 1000 MG capsule capsule Oral, Daily    pantoprazole (PROTONIX) 40 mg, Oral, Daily    promethazine-dextromethorphan (PROMETHAZINE-DM) 6.25-15 MG/5ML syrup 5 mL, Oral, 4 Times Daily PRN    Sod Fluoride-Potassium Nitrate (PreviDent 5000 Sensitive) 1.1-5 % paste Prevident 5000 Enamel Protect 1.1-5 % dental paste Brush before bed and do not rinse   Active       The following portions of the patient's history were reviewed and updated as appropriate: allergies, current medications, past family history, past medical history, past social history, past surgical history, and problem list.    Objective   Vital Signs:   /74 (BP Location: Left arm, Patient Position: Sitting)   Pulse 72   Temp 97.6 °F (36.4 °C) (Oral)   Ht 175.3 cm (69\")   Wt 94.8 kg (209 lb)   SpO2 97%   BMI 30.86 kg/m²     BP Readings from Last 3 Encounters:   08/05/24 118/74   07/20/24 152/80   07/09/24 161/91     Wt Readings from Last 3 Encounters:   08/05/24 94.8 kg (209 lb)   07/20/24 96.2 kg (212 lb)   07/09/24 96.2 kg (212 lb)           Physical Exam  Vitals reviewed.   Constitutional:       Appearance: Normal appearance.   HENT:      Head: Normocephalic and atraumatic.      Right Ear: External ear normal.      Left Ear: External ear normal.   Eyes:      Conjunctiva/sclera: Conjunctivae normal.   Cardiovascular:      Rate and Rhythm: Normal rate and regular rhythm.      Heart sounds: No murmur heard.     No friction rub. No gallop.   Pulmonary:      Effort: Pulmonary effort is normal.      Breath sounds: Normal breath sounds. No wheezing or " rhonchi.   Abdominal:      General: Bowel sounds are normal. There is no distension.      Palpations: Abdomen is soft.      Tenderness: There is no abdominal tenderness.   Musculoskeletal:      Comments: Range of motion of the right ankle is limited especially in dorsiflexion.  Negative Santoro's test noted.   Skin:     Comments: Healing abrasion noted on the lateral aspect of the right ankle.  Ankle swelling noted.   Neurological:      Mental Status: He is alert and oriented to person, place, and time.      Cranial Nerves: No cranial nerve deficit.   Psychiatric:         Mood and Affect: Mood and affect normal.         Behavior: Behavior normal.         Thought Content: Thought content normal.         Judgment: Judgment normal.            Result Review :   The following data was reviewed by: Minh Shay DO on 08/05/2024:  Common labs          1/17/2024    11:30 8/1/2024    08:57   Common Labs   Glucose 69  103    BUN 16  18    Creatinine 1.18  0.98    Sodium 139  138    Potassium 3.7  4.1    Chloride 97  99    Calcium 9.8  9.7    Albumin 4.8  4.5    Total Bilirubin 0.4  0.4    Alkaline Phosphatase 58  62    AST (SGOT) 27  24    ALT (SGPT) 17  16    WBC 7.03  6.71    Hemoglobin 15.3  14.8    Hematocrit 46.6  45.6    Platelets 302  291    Total Cholesterol  211    Triglycerides  167    HDL Cholesterol  50    LDL Cholesterol   131    Hemoglobin A1C 5.90     PSA 0.330              Lab Results   Component Value Date    SARSANTIGEN Not Detected 07/02/2024    COVID19 NOT DETECTED 06/15/2020    FLUAAG Detected (A) 07/02/2024    FLUBAG Not Detected 07/02/2024    RAPSCRN Negative 07/02/2024    INR 0.98 (L) 06/15/2020    BILIRUBINUR Negative 07/29/2019       Procedures        Assessment and Plan    Diagnoses and all orders for this visit:    1. Acute right ankle pain (Primary)    2. Medication monitoring encounter    3. Coronary artery disease involving native coronary artery of native heart without angina  pectoris    4. Family history of coronary artery disease    5. Prediabetes  -     CBC & Differential; Future  -     Comprehensive Metabolic Panel; Future  -     Hemoglobin A1c; Future    6. Mixed hyperlipidemia  -     Lipid Panel; Future    7. Hypothyroidism, unspecified type  -     TSH+Free T4; Future    8. B12 deficiency  -     Vitamin B12; Future    9. Vitamin D deficiency  -     Vitamin D,25-Hydroxy; Future    10. Essential hypertension    11. Screening for prostate cancer  -     PSA Screen; Future    12. Abrasion    Other orders  -     atorvastatin (Lipitor) 80 MG tablet; Take 1 tablet by mouth Daily.  Dispense: 90 tablet; Refill: 3  -     levothyroxine (Synthroid) 88 MCG tablet; Take 1 tablet by mouth Daily.  Dispense: 90 tablet; Refill: 3    I discussed with patient and spouse that he will continue seeing orthopedics for treatment of his right ankle dislocation.  Patient will have a Silvadene dressing placed today to help with healing as he does have an abrasion still present.  He does report that there is improving.  No signs of active infection today.  I did discuss with him having labs repeated in 6 months.  He has been referred to cardiology for coronary artery disease.  He is taking aspirin already and I will switch him to start taking atorvastatin 80 mg daily which is an increase from simvastatin 40 mg daily.      Medications Discontinued During This Encounter   Medication Reason    simvastatin (ZOCOR) 40 MG tablet     levothyroxine (SYNTHROID, LEVOTHROID) 75 MCG tablet           Follow Up   Return in about 6 months (around 2/5/2025) for Hypertension.  Patient was given instructions and counseling regarding his condition or for health maintenance advice. Please see specific information pulled into the AVS if appropriate.       Minh Shay DO  08/05/24  13:18 EDT

## 2024-08-13 ENCOUNTER — OFFICE VISIT (OUTPATIENT)
Dept: ORTHOPEDIC SURGERY | Facility: CLINIC | Age: 64
End: 2024-08-13
Payer: MEDICARE

## 2024-08-13 VITALS
HEART RATE: 80 BPM | OXYGEN SATURATION: 96 % | BODY MASS INDEX: 30.96 KG/M2 | SYSTOLIC BLOOD PRESSURE: 155 MMHG | HEIGHT: 69 IN | WEIGHT: 209 LBS | DIASTOLIC BLOOD PRESSURE: 91 MMHG

## 2024-08-13 DIAGNOSIS — M79.671 RIGHT FOOT PAIN: Primary | ICD-10-CM

## 2024-08-13 DIAGNOSIS — S93.314D: ICD-10-CM

## 2024-08-13 NOTE — PROGRESS NOTES
"Chief Complaint  Follow-up of the Right Ankle     Subjective      Rico Armstrong presents to [facility] for follow up of the right ankle. He had an injury and slipped down the ladder on 24. He notes he is doing much better.  He was able to treat the right ankle conservatively.  He doesn't have full flexion.     No Known Allergies     Social History     Socioeconomic History    Marital status:    Tobacco Use    Smoking status: Former     Current packs/day: 0.00     Average packs/day: 2.0 packs/day for 5.0 years (10.0 ttl pk-yrs)     Types: Cigarettes     Start date:      Quit date:      Years since quittin.6     Passive exposure: Past    Smokeless tobacco: Never    Tobacco comments:     AGE [age]   Vaping Use    Vaping status: Never Used   Substance and Sexual Activity    Alcohol use: Not Currently     Comment: RARE SOCIALLY    Drug use: Defer    Sexual activity: Defer        I reviewed the patient's chief complaint, history of present illness, review of systems, past medical history, surgical history, family history, social history, medications, and allergy list.     Review of Systems     Constitutional: Denies fevers, chills, weight loss  Cardiovascular: Denies chest pain, shortness of breath  Skin: Denies rashes, acute skin changes  Neurologic: Denies headache, loss of consciousness        Vital Signs:   /91   Pulse 80   Ht 175.3 cm (69.02\")   Wt 94.8 kg (209 lb)   SpO2 96%   BMI 30.85 kg/m²          Physical Exam  General: Alert. No acute distress    Ortho Exam        RIGHT ANKLE Mildly full ROM of the right ankle.  His blister has resolved on the foot.  Positive EHL, FHL, GS and TA. Sensation intact to all 5 nerves of the foot. Positive pulses. Neurovascularly intact. Calf soft, Non-tender.  Intact flexion and extension of toes.          Procedures      Imaging Results (Most Recent)       Procedure Component Value Units Date/Time    XR Ankle " 2 View Right [[id number]] Resulted: 08/13/24 0815     Updated: 08/13/24 0818             Result Review :     X-Ray Report:  Right ankle(s) X-Ray  Indication: Evaluation of the right ankle  AP/Lateral view(s)  Findings: No fracture or dislocation. No swelling.    Prior studies available for comparison: yes       XR Ankle 3+ View Right    Result Date: 7/23/2024  Narrative: X-Ray Report: Right ankle(s) X-Ray Indication: Evaluation of the right ankle AP/Lateral view(s) Findings: Routine alignment of the subtalar  joint. Prior studies available for comparison: yes             Assessment and Plan     Diagnoses and all orders for this visit:    1. Right foot pain (Primary)  -     XR Ankle 2 View Right    2. Closed traumatic medial dislocation of right subtalar joint, subsequent encounter        Discussed the treatment plan with the patient.     Discussed physical therapy.  He noted he feels he can get his ROM and do exercises at home.        Call or return if worsening symptoms.    Follow Up     PRN  He can call back for physical therapy orders if he needs.       Patient was given instructions and counseling regarding his condition or for health maintenance advice. Please see specific information pulled into the AVS if appropriate.     Scribed for Misbah Stone MD by Susana Goel MA.  08/13/24   08:13 EDT    I have personally performed the services described in this document as scribed by the above individual and it is both accurate and complete. Misbah Stone MD 08/15/24

## 2024-08-16 ENCOUNTER — PATIENT ROUNDING (BHMG ONLY) (OUTPATIENT)
Dept: FAMILY MEDICINE CLINIC | Facility: CLINIC | Age: 64
End: 2024-08-16
Payer: OTHER GOVERNMENT

## 2024-08-16 NOTE — PROGRESS NOTES
August 16, 2024    Hello, may I speak with Rico Armstrong?    My name is Nelson Ferrari       I am  with [facility]  [address]  [address]  [place] [address]  [phone].    Before we get started may I verify your date of birth? 1960    I am calling to officially welcome you to our practice and ask about your recent visit. Is this a good time to talk? yes    Tell me about your visit with us. What things went well?  Dr Anand always goes above and beyond        We're always looking for ways to make our patients' experiences even better. Do you have recommendations on ways we may improve?  no    Overall were you satisfied with your first visit to our practice? yes       I appreciate you taking the time to speak with me today. Is there anything else I can do for you? no      Thank you, and have a great day.

## 2024-08-23 ENCOUNTER — OFFICE VISIT (OUTPATIENT)
Dept: CARDIOLOGY | Facility: CLINIC | Age: 64
End: 2024-08-23
Payer: MEDICARE

## 2024-08-23 VITALS
SYSTOLIC BLOOD PRESSURE: 169 MMHG | WEIGHT: 210 LBS | DIASTOLIC BLOOD PRESSURE: 78 MMHG | HEART RATE: 73 BPM | HEIGHT: 69 IN | BODY MASS INDEX: 31.1 KG/M2

## 2024-08-23 DIAGNOSIS — I10 PRIMARY HYPERTENSION: ICD-10-CM

## 2024-08-23 DIAGNOSIS — R06.02 SOB (SHORTNESS OF BREATH): ICD-10-CM

## 2024-08-23 DIAGNOSIS — E78.2 MIXED HYPERLIPIDEMIA: Primary | ICD-10-CM

## 2024-08-23 RX ORDER — EZETIMIBE 10 MG/1
10 TABLET ORAL DAILY
Qty: 60 TABLET | Refills: 5 | Status: SHIPPED | OUTPATIENT
Start: 2024-08-23

## 2024-08-23 NOTE — PROGRESS NOTES
[facility]  Interventional Cardiology Patient Visit Note      Referring Provider:  Minh Shay,   [address]  [address]  [place] [address]    Reason for Referral:   Risk stratification due to family history of coronary artery disease  Coronary artery disease    History of Presenting Illness:  History of Present Illness  The patient is a 64-year-old male who presents for evaluation of coronary artery disease.    He reports no symptoms such as chest pain, difficulty breathing, or leg swelling. He has been on Lipitor for over a year. His diet is low in red meat but high in salt. He experiences mild shortness of breath when walking uphill, which he attributes to his lack of physical activity. He does not experience palpitations or sensations of his heart pounding or skipping beats.    He has high blood pressure but reports no history of stroke or kidney issues. He had bilateral carotid artery stenosis and underwent thromboendarterectomy of the right common and right internal carotid artery. He sees Dr. Montiel once a year for an ultrasound check-up.    He dislocated his right ankle on 07/05/2024. Initially, he was in a splint for 2 weeks, followed by a boot. However, the boot aggravated the raw skin after the blister popped, so he wore it for only 2 days, which helped. He used flip flops and crutches to get around and was very careful. He is almost back to walking normally but not quite.      SOCIAL HISTORY  He quit smoking 32 years ago. He drinks alcohol socially. He denies any illicit drug use.    FAMILY HISTORY  His father had a stroke in 11/2017 and passed away in 2018 at the age of 85. He also had late stage Alzheimer's. His mother had quadruple bypass back in 2002 at the age of 65. She just turned 85 and she is active.    Past Medical History  Past Medical History:   Diagnosis Date    Allergies     Esophageal reflux     HLD (hyperlipidemia)     HTN (hypertension)      Hypothyroid     CHERRY (obstructive sleep apnea) 02/14/2019    Throat cancer     Tonsil cancer     SEE DR ELY WILKS AT [facility]. DR MCKINLEY RIGGS AND DR RAHUL GUEVARA FOR CANCER REHAB SQUAMOUS CELL CARCINOMA         Current Outpatient Medications:     Aspirin Low Dose 81 MG EC tablet, , Disp: , Rfl:     atorvastatin (Lipitor) 80 MG tablet, Take 1 tablet by mouth Daily., Disp: 90 tablet, Rfl: 3    cetirizine (zyrTEC) 10 MG tablet, , Disp: , Rfl:     cholecalciferol (VITAMIN D3) 25 MCG (1000 UT) tablet, , Disp: , Rfl:     Cyanocobalamin ER 1000 MCG tablet controlled-release, Take  by mouth Daily., Disp: , Rfl:     fluticasone (Flonase) 50 MCG/ACT nasal spray, 2 sprays into the nostril(s) as directed by provider Daily., Disp: 16 g, Rfl: 3    levothyroxine (Synthroid) 88 MCG tablet, Take 1 tablet by mouth Daily., Disp: 90 tablet, Rfl: 3    multivitamin (THERAGRAN) tablet tablet, Take  by mouth Daily., Disp: , Rfl:     Omega-3 Fatty Acids (fish oil) 1000 MG capsule capsule, Take  by mouth Daily., Disp: , Rfl:     pantoprazole (PROTONIX) 40 MG EC tablet, Take 1 tablet by mouth Daily., Disp: 30 tablet, Rfl: 5    Sod Fluoride-Potassium Nitrate (PreviDent 5000 Sensitive) 1.1-5 % paste, Prevident 5000 Enamel Protect 1.1-5 % dental paste Brush before bed and do not rinse   Active, Disp: , Rfl:     ezetimibe (ZETIA) 10 MG tablet, Take 1 tablet by mouth Daily., Disp: 60 tablet, Rfl: 5    hydroCHLOROthiazide (HYDRODIURIL) 25 MG tablet, , Disp: , Rfl:     HYDROcodone-acetaminophen (NORCO) 5-325 MG per tablet, Take 1 tablet by mouth Every 8 (Eight) Hours As Needed for Severe Pain. (Patient not taking: Reported on 8/5/2024), Disp: 9 tablet, Rfl: 0    HYDROcodone-acetaminophen (NORCO) 5-325 MG per tablet, Take 1 tablet by mouth Every 6 (Six) Hours As Needed for Moderate Pain. (Patient not taking: Reported on 8/5/2024), Disp: 21 tablet, Rfl: 0    ibuprofen (ADVIL,MOTRIN) 200 MG tablet, Take 1 tablet by mouth Every 6 (Six)  "Hours As Needed for Mild Pain. (Patient not taking: Reported on 2024), Disp: , Rfl:     promethazine-dextromethorphan (PROMETHAZINE-DM) 6.25-15 MG/5ML syrup, Take 5 mL by mouth 4 (Four) Times a Day As Needed for Cough. (Patient not taking: Reported on 2024), Disp: 120 mL, Rfl: 0  Current outpatient and discharge medications have been reconciled for the patient.  Reviewed by: Melchor Ortez MD     There are no discontinued medications.  No Known Allergies   Social History     Tobacco Use    Smoking status: Former     Current packs/day: 0.00     Average packs/day: 2.0 packs/day for 5.0 years (10.0 ttl pk-yrs)     Types: Cigarettes     Start date:      Quit date:      Years since quittin.6     Passive exposure: Past    Smokeless tobacco: Never    Tobacco comments:     [age]   Vaping Use    Vaping status: Never Used   Substance Use Topics    Alcohol use: Not Currently     Comment: RARE SOCIALLY    Drug use: Defer     Family History   Problem Relation Age of Onset    Stroke Other     Alzheimer's disease Other     Heart disease Other     Diabetes Other     Colon cancer Neg Hx           Objective   /78 (BP Location: Right arm)   Pulse 73   Ht 175.3 cm (69\")   Wt 95.3 kg (210 lb)   BMI 31.01 kg/m²     Wt Readings from Last 3 Encounters:   24 95.3 kg (210 lb)   24 94.8 kg (209 lb)   24 94.8 kg (209 lb)     BP Readings from Last 3 Encounters:   24 169/78   24 155/91   24 118/74       Physical Exam  Constitutional:  Awake. Not in acute distress. Normal appearance.   Neck: No carotid bruit, hepatojugular reflux or JVD.   Cardiovascular:      Rate and Rhythm: Normal rate and regular rhythm.      Chest Wall: PMI is not displaced.      Heart sounds: Normal heart sounds, S1 normal and S2 normal. No murmur heard.       No friction rub. No gallop. No S3 or S4 sounds.    Pulmonary: Pulmonary effort is normal. Normal breath sounds. No wheezing, rhonchi or rales. " "  Extremities: No Bilateral edema is noted.   Skin: Warm and dry. Non cyanotic, No petechiae or rash.   Neurological: Alert and oriented x 3  Psychiatric:  Behavior is cooperative.       Result Review :   The following data was reviewed by Melchor Ortez MD on 08/23/2024   No results found for: \"PROBNP\"  CMP          1/17/2024    11:30 8/1/2024    08:57   CMP   Glucose 69  103    BUN 16  18    Creatinine 1.18  0.98    EGFR 68.5  85.6    Sodium 139  138    Potassium 3.7  4.1    Chloride 97  99    Calcium 9.8  9.7    Total Protein 7.6  7.5    Albumin 4.8  4.5    Globulin 2.8  3.0    Total Bilirubin 0.4  0.4    Alkaline Phosphatase 58  62    AST (SGOT) 27  24    ALT (SGPT) 17  16    Albumin/Globulin Ratio 1.7  1.5    BUN/Creatinine Ratio 13.6  18.4    Anion Gap 12.8  14.3      CBC w/diff          1/17/2024    11:30 8/1/2024    08:57   CBC w/Diff   WBC 7.03  6.71    RBC 5.44  5.18    Hemoglobin 15.3  14.8    Hematocrit 46.6  45.6    MCV 85.7  88.0    MCH 28.1  28.6    MCHC 32.8  32.5    RDW 12.8  13.1    Platelets 302  291    Neutrophil Rel % 42.0  48.2    Immature Granulocyte Rel % 0.1  0.3    Lymphocyte Rel % 41.1  37.1    Monocyte Rel % 8.1  8.2    Eosinophil Rel % 7.7  5.2    Basophil Rel % 1.0  1.0       Lab Results   Component Value Date    TSH 7.410 (H) 08/01/2024      Lab Results   Component Value Date    FREET4 1.19 08/01/2024      No results found for: \"DDIMERQUANT\"  Magnesium   Date Value Ref Range Status   07/30/2019 2.0 1.6 - 2.3 mg/dL Final      No results found for: \"DIGOXIN\"   No results found for: \"TROPONINT\"   No results found for: \"POCTROP\"       A1C Last 3 Results          1/17/2024    11:30   HGBA1C Last 3 Results   Hemoglobin A1C 5.90      Lipid Panel          8/1/2024    08:57   Lipid Panel   Total Cholesterol 211    Triglycerides 167    HDL Cholesterol 50    VLDL Cholesterol 30    LDL Cholesterol  131    LDL/HDL Ratio 2.55            No results found for this or any previous visit.        The " 10-year ASCVD risk score (Mario MOTLEY, et al., 2019) is: 25.6%    Values used to calculate the score:      Age: 64 years      Sex: Male      Is Non- : No      Diabetic: No      Tobacco smoker: No      Systolic Blood Pressure: 169 mmHg      Is BP treated: Yes      HDL Cholesterol: 50 mg/dL      Total Cholesterol: 211 mg/dL        Assessment  Assessment & Plan  1. Coronary Artery Disease.  2. Hypertension  3. Hyperlipidemia  4. History of peripheral vascular disease, status post right carotid artery endarterectomy  5. Asymptomatic PVCs  6.  History of AV susy reentrant tachycardia  7. History of loop recorder removal    His calcium score of 107 indicates a moderate risk of coronary artery disease. His LDL level is currently at 131, which is above the target of 70. Despite being on Lipitor for over a year, his LDL levels remain high.   Zetia 10 mg will be added to his regimen to further reduce his cholesterol levels.   He is advised to monitor his blood pressure at home and maintain a record. Dietary modifications including a salt-restricted diet, less red meat, more white meat, fish, chicken, vegetables, and proteins are recommended.   An echocardiogram will be ordered to assess his heart function. If the results are satisfactory, the stress test can be postponed until his ankle has fully healed and he is comfortable walking.     In the interim, he should continue his current medications to ensure his blood pressure remains well controlled, ideally below 130/90. If Zetia causes gastric upset, alternative options will be considered. His lipid panels will be rechecked in 12 weeks.   If there is no improvement, a different medication will be considered. Once he feels ready, a treadmill stress test will be conducted to assess his risk of cardiovascular events in the upcoming year.    Continue aspirin 81 mg p.o. daily along with high intensity Lipitor 80 mg p.o. daily.  Continue hydrochlorothiazide  25 mg p.o. daily for hypertension    Patient's Ryan score is greater than 10% which puts him at high risk of coronary artery disease with next 10 years.  Therefore aggressive risk factor management is necessary.  The risk will increase manifold with uncontrolled blood pressure exclusively.    Patient has had loop recorder in the past for rhythm monitoring after he was noted to have a brief sinus pause.  Per report no intervention was performed presumably from benign heart rhythms.            Plan    ECG 12 Lead    Date/Time: 8/23/2024 1:07 PM  Performed by: Melchor Ortez MD    Authorized by: Melchor Ortez MD  Comparison: compared with previous ECG from 6/17/2020  Comparison to previous ECG: Rare PVCs are noted  Nonspecific T wave abnormalities are present  Rhythm: sinus rhythm    Clinical impression: abnormal EKG                   Diagnoses and all orders for this visit:    1. Mixed hyperlipidemia (Primary)  -     ezetimibe (ZETIA) 10 MG tablet; Take 1 tablet by mouth Daily.  Dispense: 60 tablet; Refill: 5    2. SOB (shortness of breath)  -     Adult Transthoracic Echo Complete W/ Cont if Necessary Per Protocol; Future    3. Primary hypertension    Other orders  -     ECG 12 Lead      Follow Up     Return in about 3 months (around 11/23/2024) for With Dr. Ortez.      Melchor Ortez MD  Interventional Cardiology  08/23/2024  13:10 EDT      Patient was given instructions and counseling regarding his condition or for health maintenance advice. Please see specific information pulled into the AVS if appropriate.     Please note that portions of this document were completed using a voice recognition program.     Patient or patient representative verbalized consent for the use of Ambient Listening during the visit with  Melchor Ortez MD for chart documentation. 8/23/2024  13:07 EDT

## 2024-09-12 ENCOUNTER — HOSPITAL ENCOUNTER (OUTPATIENT)
Dept: CARDIOLOGY | Facility: HOSPITAL | Age: 64
Discharge: HOME | End: 2024-09-12
Admitting: STUDENT IN AN ORGANIZED HEALTH CARE EDUCATION/TRAINING PROGRAM
Payer: MEDICARE

## 2024-09-12 DIAGNOSIS — R06.02 SOB (SHORTNESS OF BREATH): ICD-10-CM

## 2024-09-12 PROCEDURE — 93306 TTE W/DOPPLER COMPLETE: CPT

## 2024-09-13 LAB
BH CV ECHO MEAS - AO ROOT DIAM: 3 CM
BH CV ECHO MEAS - EDV(CUBED): 111.7 ML
BH CV ECHO MEAS - EDV(MOD-SP2): 51.6 ML
BH CV ECHO MEAS - EDV(MOD-SP4): 47.8 ML
BH CV ECHO MEAS - EF(MOD-BP): 64.7 %
BH CV ECHO MEAS - EF(MOD-SP2): 64.5 %
BH CV ECHO MEAS - EF(MOD-SP4): 67.6 %
BH CV ECHO MEAS - ESV(CUBED): 14.4 ML
BH CV ECHO MEAS - ESV(MOD-SP2): 18.3 ML
BH CV ECHO MEAS - ESV(MOD-SP4): 15.5 ML
BH CV ECHO MEAS - FS: 49.5 %
BH CV ECHO MEAS - IVS/LVPW: 0.99 CM
BH CV ECHO MEAS - IVSD: 0.84 CM
BH CV ECHO MEAS - LA DIMENSION: 3.7 CM
BH CV ECHO MEAS - LAT PEAK E' VEL: 7.9 CM/SEC
BH CV ECHO MEAS - LV DIASTOLIC VOL/BSA (35-75): 22.8 CM2
BH CV ECHO MEAS - LV MASS(C)D: 135.9 GRAMS
BH CV ECHO MEAS - LV SYSTOLIC VOL/BSA (12-30): 7.4 CM2
BH CV ECHO MEAS - LVIDD: 4.8 CM
BH CV ECHO MEAS - LVIDS: 2.43 CM
BH CV ECHO MEAS - LVPWD: 0.84 CM
BH CV ECHO MEAS - MED PEAK E' VEL: 6.4 CM/SEC
BH CV ECHO MEAS - MV A MAX VEL: 60.8 CM/SEC
BH CV ECHO MEAS - MV DEC SLOPE: 196.2 CM/SEC2
BH CV ECHO MEAS - MV DEC TIME: 0.23 SEC
BH CV ECHO MEAS - MV E MAX VEL: 44.6 CM/SEC
BH CV ECHO MEAS - MV E/A: 0.73
BH CV ECHO MEAS - RVDD: 3.2 CM
BH CV ECHO MEAS - SV(MOD-SP2): 33.3 ML
BH CV ECHO MEAS - SV(MOD-SP4): 32.3 ML
BH CV ECHO MEAS - SVI(MOD-SP2): 15.9 ML/M2
BH CV ECHO MEAS - SVI(MOD-SP4): 15.4 ML/M2
BH CV ECHO MEASUREMENTS AVERAGE E/E' RATIO: 6.24
LEFT ATRIUM VOLUME INDEX: 24.4 ML/M2

## 2024-09-16 ENCOUNTER — TELEPHONE (OUTPATIENT)
Dept: CARDIOLOGY | Facility: CLINIC | Age: 64
End: 2024-09-16
Payer: OTHER GOVERNMENT

## 2024-09-30 ENCOUNTER — TELEPHONE (OUTPATIENT)
Dept: FAMILY MEDICINE CLINIC | Facility: CLINIC | Age: 64
End: 2024-09-30
Payer: OTHER GOVERNMENT

## 2024-09-30 DIAGNOSIS — M25.571 ACUTE RIGHT ANKLE PAIN: Primary | ICD-10-CM

## 2024-09-30 DIAGNOSIS — M79.671 RIGHT FOOT PAIN: ICD-10-CM

## 2024-09-30 NOTE — TELEPHONE ENCOUNTER
Caller: Rico Armstrong    Relationship: Self    Best call back number: [phone]    What is the medical concern/diagnosis: N/A    What specialty or service is being requested: PODIATRY    What is the provider, practice or medical service name: N/A    What is the office location: N/A    What is the office phone number: N/A    Any additional details: PATIENT WOULD LIKE A REFERRAL TO A PODIATRIST. HE HAD A REFERRAL BACK IN JULY 5, 2024 FROM THE ER. HE DOES NOT KNOW THE NAME OF THAT PODIATRIST. PLEASE SEND NEW REFERRAL TO PODIATRY AND CALL PATIENT TO SCHEDULE AND ADVISE. PATIENT IS IN PAIN WITH RIGHT FOOT AND ANKLE.

## 2024-10-21 ENCOUNTER — CLINICAL SUPPORT (OUTPATIENT)
Dept: FAMILY MEDICINE CLINIC | Facility: CLINIC | Age: 64
End: 2024-10-21
Payer: MEDICARE

## 2024-10-21 DIAGNOSIS — Z23 NEED FOR INFLUENZA VACCINATION: Primary | ICD-10-CM

## 2024-10-21 PROCEDURE — 90662 IIV NO PRSV INCREASED AG IM: CPT | Performed by: FAMILY MEDICINE

## 2024-10-21 PROCEDURE — G0008 ADMIN INFLUENZA VIRUS VAC: HCPCS | Performed by: FAMILY MEDICINE

## 2024-10-31 ENCOUNTER — OFFICE VISIT (OUTPATIENT)
Dept: FAMILY MEDICINE CLINIC | Facility: CLINIC | Age: 64
End: 2024-10-31
Payer: MEDICARE

## 2024-10-31 VITALS
WEIGHT: 219.5 LBS | HEART RATE: 86 BPM | DIASTOLIC BLOOD PRESSURE: 82 MMHG | HEIGHT: 69 IN | BODY MASS INDEX: 32.51 KG/M2 | OXYGEN SATURATION: 94 % | TEMPERATURE: 97.9 F | SYSTOLIC BLOOD PRESSURE: 156 MMHG

## 2024-10-31 DIAGNOSIS — R53.83 OTHER FATIGUE: ICD-10-CM

## 2024-10-31 DIAGNOSIS — R09.81 SINUS CONGESTION: ICD-10-CM

## 2024-10-31 DIAGNOSIS — R05.1 ACUTE COUGH: Primary | ICD-10-CM

## 2024-10-31 PROCEDURE — 99213 OFFICE O/P EST LOW 20 MIN: CPT

## 2024-10-31 PROCEDURE — 87428 SARSCOV & INF VIR A&B AG IA: CPT

## 2024-10-31 PROCEDURE — 3077F SYST BP >= 140 MM HG: CPT

## 2024-10-31 PROCEDURE — 1126F AMNT PAIN NOTED NONE PRSNT: CPT

## 2024-10-31 PROCEDURE — 3079F DIAST BP 80-89 MM HG: CPT

## 2024-10-31 RX ORDER — DEXTROMETHORPHAN HYDROBROMIDE AND PROMETHAZINE HYDROCHLORIDE 15; 6.25 MG/5ML; MG/5ML
5 SYRUP ORAL 4 TIMES DAILY PRN
Qty: 120 ML | Refills: 0 | Status: SHIPPED | OUTPATIENT
Start: 2024-10-31

## 2024-10-31 RX ORDER — GUAIFENESIN 600 MG/1
1200 TABLET, EXTENDED RELEASE ORAL 2 TIMES DAILY
Qty: 56 TABLET | Refills: 0 | Status: SHIPPED | OUTPATIENT
Start: 2024-10-31 | End: 2024-11-14

## 2024-10-31 RX ORDER — ESOMEPRAZOLE MAGNESIUM 40 MG/1
CAPSULE, DELAYED RELEASE ORAL
COMMUNITY
Start: 2024-09-20

## 2024-10-31 NOTE — PROGRESS NOTES
Chief Complaint  Chief Complaint   Patient presents with    Cough    Fatigue       Subjective      Rico Armstrong presents to [facility]  History of Present Illness  The patient is a 64-year-old male who presents today with complaints of a cough and fatigue. He received his flu and COVID-19 shots last week and has felt unwell since.    He began to feel unwell a few days after receiving his flu shot on 10/21/2024. His symptoms include a persistent wet cough, congestion, and mild fatigue. The cough is productive, yielding yellow, milky-colored mucus. He reports no fevers, chills, sore throat, ear pressure, or ear pain. He also reports no headaches, although he notes that coughing can cause a slight headache.    He is currently taking cetirizine and Flonase for allergies. He recalls that a cough syrup prescribed during a previous visit to urgent care was beneficial.      Objective     Medical History:  Past Medical History:   Diagnosis Date    Allergies     Esophageal reflux     HLD (hyperlipidemia)     HTN (hypertension)     Hypothyroid     CHERRY (obstructive sleep apnea) 02/14/2019    Throat cancer     Tonsil cancer     SEE DR ELY WILKS AT [facility]. DR MCKINLEY RIGGS AND DR RAHUL GUEVARA FOR CANCER REHAB SQUAMOUS CELL CARCINOMA     Past Surgical History:   Procedure Laterality Date    APPENDECTOMY      COLONOSCOPY  05/02/2018    ENDOSCOPY N/A 5/29/2024    Procedure: ESOPHAGOGASTRODUODENOSCOPY WITH BIOPSIES;  Surgeon: Ryan Woods MD;  Location: [facility] ENDOSCOPY;  Service: Gastroenterology;  Laterality: N/A;  GASTRIC POLYP/HIATAL HERNIA    GASTROSTOMY FEEDING TUBE INSERTION      KNEE SURGERY      NOSE SURGERY      CARTLIAGE GRAFT AUTOGENOUS, TO NOSE FROM RIB    PORTACATH PLACEMENT      SKIN CANCER EXCISION      LEFT EAR    UPPER GASTROINTESTINAL ENDOSCOPY        Social History     Tobacco Use    Smoking status: Former     Current packs/day: 0.00     Average  packs/day: 2.0 packs/day for 5.0 years (10.0 ttl pk-yrs)     Types: Cigarettes     Start date:      Quit date:      Years since quittin.8     Passive exposure: Past    Smokeless tobacco: Never    Tobacco comments:     AGE [age]   Vaping Use    Vaping status: Never Used   Substance Use Topics    Alcohol use: Not Currently     Comment: RARE SOCIALLY    Drug use: Defer     Family History   Problem Relation Age of Onset    Stroke Other     Alzheimer's disease Other     Heart disease Other     Diabetes Other     Colon cancer Neg Hx        Medications:  Prior to Admission medications    Medication Sig Start Date End Date Taking? Authorizing Provider   Aspirin Low Dose 81 MG EC tablet  21  Yes ProviderShravan MD   atorvastatin (Lipitor) 80 MG tablet Take 1 tablet by mouth Daily. 24  Yes Minh Shay DO   cetirizine (zyrTEC) 10 MG tablet  7/3/21  Yes ProviderShravan MD   cholecalciferol (VITAMIN D3) 25 MCG (1000 UT) tablet  10/26/21  Yes ProviderShravan MD   Cyanocobalamin ER 1000 MCG tablet controlled-release Take  by mouth Daily.   Yes ProviderShravan MD   esomeprazole (nexIUM) 40 MG capsule  24  Yes ProviderShravan MD   ezetimibe (ZETIA) 10 MG tablet Take 1 tablet by mouth Daily. 24  Yes Melchor Ortez MD   fluticasone (Flonase) 50 MCG/ACT nasal spray 2 sprays into the nostril(s) as directed by provider Daily. 3/29/24  Yes Aria Iyer APRN   hydroCHLOROthiazide (HYDRODIURIL) 25 MG tablet  21  Yes ProviderShravan MD   levothyroxine (Synthroid) 88 MCG tablet Take 1 tablet by mouth Daily. 24  Yes Minh Shay DO   multivitamin (THERAGRAN) tablet tablet Take  by mouth Daily.   Yes Shravan Barahona MD   Omega-3 Fatty Acids (fish oil) 1000 MG capsule capsule Take  by mouth Daily.   Yes ProviderShravan MD   Sod Fluoride-Potassium Nitrate (PreviDent 5000 Sensitive) 1.1-5 % paste Prevident 5000 Enamel Protect 1.1-5 %  "dental paste Brush before bed and do not rinse   Active   Yes Shravan Barahona MD   pantoprazole (PROTONIX) 40 MG EC tablet Take 1 tablet by mouth Daily. 5/29/24 10/31/24 Yes Ryan Woods MD   HYDROcodone-acetaminophen (NORCO) 5-325 MG per tablet Take 1 tablet by mouth Every 8 (Eight) Hours As Needed for Severe Pain.  Patient not taking: Reported on 8/5/2024 7/5/24   Roby Ruiz MD   HYDROcodone-acetaminophen (NORCO) 5-325 MG per tablet Take 1 tablet by mouth Every 6 (Six) Hours As Needed for Moderate Pain.  Patient not taking: Reported on 8/5/2024 7/9/24   Misbah Stone MD   ibuprofen (ADVIL,MOTRIN) 200 MG tablet Take 1 tablet by mouth Every 6 (Six) Hours As Needed for Mild Pain.  Patient not taking: Reported on 8/23/2024    Shravan Barahona MD   promethazine-dextromethorphan (PROMETHAZINE-DM) 6.25-15 MG/5ML syrup Take 5 mL by mouth 4 (Four) Times a Day As Needed for Cough.  Patient not taking: Reported on 8/5/2024 7/2/24   Gina Wilkinson APRN        Allergies:   Patient has no known allergies.    Health Maintenance Due   Topic Date Due    ANNUAL WELLNESS VISIT  Never done    COVID-19 Vaccine (6 - 2023-24 season) 09/01/2024         Vital Signs:   /82 (BP Location: Left arm, Patient Position: Sitting, Cuff Size: Adult)   Pulse 86   Temp 97.9 °F (36.6 °C) (Oral)   Ht 175.3 cm (69\")   Wt 99.6 kg (219 lb 8 oz)   SpO2 94%   BMI 32.41 kg/m²     Wt Readings from Last 3 Encounters:   10/31/24 99.6 kg (219 lb 8 oz)   08/23/24 95.3 kg (210 lb)   08/13/24 94.8 kg (209 lb)     BP Readings from Last 3 Encounters:   10/31/24 156/82   08/23/24 169/78   08/13/24 155/91                Physical Exam  Vitals reviewed.   Constitutional:       Appearance: Normal appearance. He is well-developed.   HENT:      Head: Normocephalic and atraumatic.      Right Ear: Tympanic membrane normal. No tenderness. No middle ear effusion. Tympanic membrane is not erythematous.      Left Ear: Tympanic " membrane normal. No tenderness.  No middle ear effusion. Tympanic membrane is not erythematous.   Eyes:      Conjunctiva/sclera: Conjunctivae normal.      Pupils: Pupils are equal, round, and reactive to light.   Cardiovascular:      Rate and Rhythm: Normal rate and regular rhythm.      Heart sounds: No murmur heard.     No friction rub. No gallop.   Pulmonary:      Effort: Pulmonary effort is normal.      Breath sounds: Normal breath sounds. No wheezing or rhonchi.   Skin:     General: Skin is warm and dry.   Neurological:      Mental Status: He is alert and oriented to person, place, and time.      Cranial Nerves: No cranial nerve deficit.   Psychiatric:         Mood and Affect: Mood and affect normal.         Behavior: Behavior normal.         Thought Content: Thought content normal.         Judgment: Judgment normal.       Physical Exam        Result Review :    The following data was reviewed by BARRERA Krause on 10/31/24 at 15:20 EDT:    SARS Antigen   Date Value Ref Range Status   10/31/2024 Not Detected Not Detected, Presumptive Negative Final     Influenza A Antigen TOM   Date Value Ref Range Status   10/31/2024 Not Detected Not Detected Final     Influenza B Antigen TOM   Date Value Ref Range Status   10/31/2024 Not Detected Not Detected Final     Internal Control   Date Value Ref Range Status   10/31/2024 Passed Passed Final     Lot Number   Date Value Ref Range Status   10/31/2024 4,220,658  Final     Expiration Date   Date Value Ref Range Status   10/31/2024 11/14/2025  Final       Results  Laboratory Studies  Flu and Covid tests are negative.               Assessment and Plan    Diagnoses and all orders for this visit:    1. Acute cough (Primary)  -     POCT SARS-CoV-2 Antigen TOM + Flu  -     promethazine-dextromethorphan (PROMETHAZINE-DM) 6.25-15 MG/5ML syrup; Take 5 mL by mouth 4 (Four) Times a Day As Needed for Cough.  Dispense: 120 mL; Refill: 0  -     guaiFENesin (Mucinex) 600 MG 12  hr tablet; Take 2 tablets by mouth 2 (Two) Times a Day for 14 days.  Dispense: 56 tablet; Refill: 0    2. Other fatigue  -     POCT SARS-CoV-2 Antigen TOM + Flu  -     guaiFENesin (Mucinex) 600 MG 12 hr tablet; Take 2 tablets by mouth 2 (Two) Times a Day for 14 days.  Dispense: 56 tablet; Refill: 0    3. Sinus congestion  -     guaiFENesin (Mucinex) 600 MG 12 hr tablet; Take 2 tablets by mouth 2 (Two) Times a Day for 14 days.  Dispense: 56 tablet; Refill: 0       Assessment & Plan  1. Sinus congestion.  His influenza and COVID-19 tests returned negative results. There are no signs of wheezing or pneumonia, suggesting the symptoms may be due to sinus congestion. His recent influenza and COVID-19 vaccinations could have temporarily affected his immune system to cause current symptoms. A prescription for cough syrup (5 mL, four times daily as needed) and Mucinex was provided. He was advised to maintain hydration and continue with his antihistamine and nasal spray regimen. Tylenol was recommended for any aches and pains.  He is afebrile and does not present any concerning symptoms to warrant antibiotic coverage.            Smoking Cessation:    Rico Armstrong  reports that he quit smoking about 31 years ago. His smoking use included cigarettes. He started smoking about 36 years ago. He has a 10 pack-year smoking history. He has been exposed to tobacco smoke. He has never used smokeless tobacco.             Follow Up   Return if symptoms worsen or fail to improve.  Patient was given instructions and counseling regarding his condition or for health maintenance advice. Please see specific information pulled into the AVS if appropriate.     Please note that portions of this note were completed with a voice recognition program.    Patient or patient representative verbalized consent for the use of Ambient Listening during the visit with  BARRERA Krause for chart documentation. 10/31/2024  15:17 EDT

## 2024-11-05 DIAGNOSIS — R05.1 ACUTE COUGH: ICD-10-CM

## 2024-11-05 DIAGNOSIS — R53.83 OTHER FATIGUE: ICD-10-CM

## 2024-11-05 DIAGNOSIS — R09.81 SINUS CONGESTION: ICD-10-CM

## 2024-11-05 RX ORDER — GUAIFENESIN 600 MG/1
1200 TABLET, EXTENDED RELEASE ORAL 2 TIMES DAILY
Qty: 56 TABLET | Refills: 0 | Status: SHIPPED | OUTPATIENT
Start: 2024-11-05 | End: 2024-11-19

## 2024-11-06 ENCOUNTER — TELEPHONE (OUTPATIENT)
Dept: GASTROENTEROLOGY | Facility: CLINIC | Age: 64
End: 2024-11-06

## 2024-11-06 ENCOUNTER — OFFICE VISIT (OUTPATIENT)
Dept: GASTROENTEROLOGY | Facility: CLINIC | Age: 64
End: 2024-11-06
Payer: MEDICARE

## 2024-11-06 VITALS
DIASTOLIC BLOOD PRESSURE: 67 MMHG | HEART RATE: 71 BPM | SYSTOLIC BLOOD PRESSURE: 172 MMHG | HEIGHT: 69 IN | BODY MASS INDEX: 32.7 KG/M2 | WEIGHT: 220.8 LBS

## 2024-11-06 DIAGNOSIS — K44.9 HIATAL HERNIA: ICD-10-CM

## 2024-11-06 DIAGNOSIS — R13.19 ESOPHAGEAL DYSPHAGIA: ICD-10-CM

## 2024-11-06 DIAGNOSIS — K20.0 ESOPHAGITIS, EOSINOPHILIC: ICD-10-CM

## 2024-11-06 DIAGNOSIS — R12 HEARTBURN: Primary | ICD-10-CM

## 2024-11-06 RX ORDER — VONOPRAZAN FUMARATE 26.72 MG/1
20 TABLET ORAL DAILY
Qty: 10 TABLET | Refills: 0 | COMMUNITY
Start: 2024-11-06 | End: 2024-11-08

## 2024-11-06 RX ORDER — VONOPRAZAN FUMARATE 26.72 MG/1
20 TABLET ORAL DAILY
Qty: 90 TABLET | Refills: 1 | Status: SHIPPED | OUTPATIENT
Start: 2024-11-06

## 2024-11-06 NOTE — PROGRESS NOTES
Patient Name: Rico Armstrong   Visit Date: 11/06/2024   Patient ID: [id number]  Provider: BARRERA Mohamud    Sex: male  Location:  Location Address:  Location Phone: [phone] [place] [address] [phone]    YOB: 1960  Age: 64 y.o.   Primary Care Provider Minh Shay DO      Referring Provider: Ryan Woods MD        Chief Complaint  Heartburn (Acid reflux break through ) and EGD (Follow up )    History of Present Illness    Pt initially presented 4/8/24 with dysphagia and worsening heartburn on Nexium.  History of throat / tonsillar cancer in 2015, had radiation and chemo treatments.  History of  EOE in 2018    EGD 7/2018 by Dr. Woods: Small size hiatal hernia, esophagitis possibly eosinophilic-eosinophilic esophagitis noted, 11 mm polyp in the stomach body removed-fundic gland polyp  EGD/colonoscopy May 2018 by Dr. Woods: Good prep, hiatal hernia, Schatzki's ring with dilation performed from 15 to 18 mm, biopsy showed increased eosinophils up to 35 per high-power field, 4 small polyps in the stomach removed-fundic gland, normal duodenum, diverticula noted in the sigmoid otherwise negative    Repeat  EGD 5/29/2024: Medium size hiatal hernia, irregular Z-line-biopsy with increased eosinophils up to 56 per high-power field, also reflux esophagitis noted, 4 polyps in the stomach with biopsy taken, size 12 mm--polyps are fundic gland/benign normal duodenum  Protonix prescribed    Pt states he wakes up with burning in throat in the middle of the night, he gets up and takes Nexium and has to sit up for a couple hrs, and drinks water to try to help. Denies eating bigger meals or eating late. Pt feels that previously the only thing that worked for him is Nexium, so he did not want to try Protonix. Pt states he has tried Protonix and Prilosec in the past and they did not work. He reports being miserable when these sx occurs.   Pt states he does chew excessively and  "he's the last one at the table, he has had a few issues of dysphagia where he fears he cannot breathe and that food is lodged in throat area. Does not occur often. Also feels food goes down slowly. R/w pt EoE dx.       Past Medical History:   Diagnosis Date    Allergies     Esophageal reflux     HLD (hyperlipidemia)     HTN (hypertension)     Hypothyroid     CHERRY (obstructive sleep apnea) 2019    Throat cancer     Tonsil cancer     SEE DR ELY WILKS AT [facility]. DR MCKINLEY RIGGS AND DR RAHUL GUEVARA FOR CANCER REHAB SQUAMOUS CELL CARCINOMA       Past Surgical History:   Procedure Laterality Date    APPENDECTOMY      COLONOSCOPY  2018    ENDOSCOPY N/A 2024    Procedure: ESOPHAGOGASTRODUODENOSCOPY WITH BIOPSIES;  Surgeon: Ryan Woods MD;  Location: [facility] ENDOSCOPY;  Service: Gastroenterology;  Laterality: N/A;  GASTRIC POLYP/HIATAL HERNIA    GASTROSTOMY FEEDING TUBE INSERTION      KNEE SURGERY      NOSE SURGERY      CARTLIAGE GRAFT AUTOGENOUS, TO NOSE FROM RIB    PORTACATH PLACEMENT      SKIN CANCER EXCISION      LEFT EAR    UPPER GASTROINTESTINAL ENDOSCOPY         No Known Allergies    Family History   Problem Relation Age of Onset    Stroke Other     Alzheimer's disease Other     Heart disease Other     Diabetes Other     Colon cancer Neg Hx         Social History     Tobacco Use    Smoking status: Former     Current packs/day: 0.00     Average packs/day: 2.0 packs/day for 5.0 years (10.0 ttl pk-yrs)     Types: Cigarettes     Start date:      Quit date:      Years since quittin.8     Passive exposure: Past    Smokeless tobacco: Never    Tobacco comments:     AGE [age]   Vaping Use    Vaping status: Never Used   Substance Use Topics    Alcohol use: Not Currently     Comment: RARE SOCIALLY    Drug use: Defer       Objective     Vital Signs:   /67 (BP Location: Left arm, Patient Position: Sitting, Cuff Size: Adult)   Pulse 71   Ht 175.3 cm (69\")   Wt 100 kg " (220 lb 12.8 oz)   BMI 32.61 kg/m²       Physical Exam  Constitutional:       General: The patient is not in acute distress.     Appearance: Normal appearance.   HENT:      Head: Normocephalic and atraumatic.      Nose: Nose normal.   Pulmonary:      Effort: Pulmonary effort is normal. No respiratory distress.   Abdominal:      General: Abdomen is flat.      Palpations: Abdomen is soft. There is no mass.      Tenderness: There is no abdominal tenderness. There is no guarding.   Musculoskeletal:      Cervical back: Neck supple.      Right lower leg: No edema.      Left lower leg: No edema.   Skin:     General: Skin is warm and dry.   Neurological:      General: No focal deficit present.      Mental Status: The patient is alert and oriented to person, place, and time.      Gait: Gait normal.   Psychiatric:         Mood and Affect: Mood normal.         Speech: Speech normal.         Behavior: Behavior normal.         Thought Content: Thought content normal.     Result Review :   The following data was reviewed by: BARRERA Mohamud on 11/06/2024:    CBC w/diff          1/17/2024    11:30 8/1/2024    08:57   CBC w/Diff   WBC 7.03  6.71    RBC 5.44  5.18    Hemoglobin 15.3  14.8    Hematocrit 46.6  45.6    MCV 85.7  88.0    MCH 28.1  28.6    MCHC 32.8  32.5    RDW 12.8  13.1    Platelets 302  291    Neutrophil Rel % 42.0  48.2    Immature Granulocyte Rel % 0.1  0.3    Lymphocyte Rel % 41.1  37.1    Monocyte Rel % 8.1  8.2    Eosinophil Rel % 7.7  5.2    Basophil Rel % 1.0  1.0      CMP          1/17/2024    11:30 8/1/2024    08:57   CMP   Glucose 69  103    BUN 16  18    Creatinine 1.18  0.98    EGFR 68.5  85.6    Sodium 139  138    Potassium 3.7  4.1    Chloride 97  99    Calcium 9.8  9.7    Total Protein 7.6  7.5    Albumin 4.8  4.5    Globulin 2.8  3.0    Total Bilirubin 0.4  0.4    Alkaline Phosphatase 58  62    AST (SGOT) 27  24    ALT (SGPT) 17  16    Albumin/Globulin Ratio 1.7  1.5    BUN/Creatinine  Ratio 13.6  18.4    Anion Gap 12.8  14.3                    Assessment and Plan    Diagnoses and all orders for this visit:    1. Heartburn (Primary)    2. Esophagitis, eosinophilic    3. Esophageal dysphagia    4. Hiatal hernia    Other orders  -     Vonoprazan Fumarate (Voquezna) 20 MG tablet; Take 1 tablet by mouth Daily.  Dispense: 90 tablet; Refill: 1              Follow Up   Return in about 6 months (around 5/6/2025).  We discussed changing meds from Nexium, will try Voquezna  Gaviscon for any breakthrough symptoms at night  Dupixent for EoE - message sent to nurse navigator  Written info given to patient on Dupixent and eosinophilic esophagitis  small frequent meals, do not eat 3 hours before bed, and elevate head of bed.   Pt had some interest in discussing hiatal hernia repair, but decided to wait on surgical consult.       Patient was given instructions and counseling regarding his condition or for health maintenance advice. Please see specific information pulled into the AVS if appropriate.

## 2024-11-06 NOTE — PATIENT INSTRUCTIONS
Gaviscon for any breakthrough symptoms at night   small frequent meals, do not eat 3 hours before bed, and elevate head of bed.

## 2024-11-07 RX ORDER — DUPILUMAB 300 MG/2ML
300 INJECTION, SOLUTION SUBCUTANEOUS WEEKLY
Qty: 4 EACH | Refills: 5 | Status: SHIPPED | OUTPATIENT
Start: 2024-11-07

## 2024-11-07 NOTE — TELEPHONE ENCOUNTER
Approval for Dupixent received through [facility]. 10/7/24-11/6/25. [facility] does carry. Patient would like script sent there.

## 2024-11-08 ENCOUNTER — OFFICE VISIT (OUTPATIENT)
Dept: FAMILY MEDICINE CLINIC | Facility: CLINIC | Age: 64
End: 2024-11-08
Payer: MEDICARE

## 2024-11-08 VITALS
HEIGHT: 69 IN | DIASTOLIC BLOOD PRESSURE: 82 MMHG | BODY MASS INDEX: 31.61 KG/M2 | OXYGEN SATURATION: 96 % | SYSTOLIC BLOOD PRESSURE: 142 MMHG | HEART RATE: 82 BPM | WEIGHT: 213.4 LBS

## 2024-11-08 DIAGNOSIS — R13.10 DYSPHAGIA, UNSPECIFIED TYPE: ICD-10-CM

## 2024-11-08 DIAGNOSIS — K20.0 ESOPHAGITIS, EOSINOPHILIC: ICD-10-CM

## 2024-11-08 DIAGNOSIS — I25.10 CORONARY ARTERY DISEASE INVOLVING NATIVE CORONARY ARTERY OF NATIVE HEART WITHOUT ANGINA PECTORIS: ICD-10-CM

## 2024-11-08 DIAGNOSIS — E78.2 MIXED HYPERLIPIDEMIA: ICD-10-CM

## 2024-11-08 DIAGNOSIS — E53.8 B12 DEFICIENCY: ICD-10-CM

## 2024-11-08 DIAGNOSIS — I10 ESSENTIAL HYPERTENSION: ICD-10-CM

## 2024-11-08 DIAGNOSIS — K44.9 HIATAL HERNIA: ICD-10-CM

## 2024-11-08 DIAGNOSIS — E55.9 VITAMIN D DEFICIENCY: ICD-10-CM

## 2024-11-08 DIAGNOSIS — Z00.00 MEDICARE ANNUAL WELLNESS VISIT, INITIAL: Primary | ICD-10-CM

## 2024-11-08 DIAGNOSIS — E03.9 HYPOTHYROIDISM, UNSPECIFIED TYPE: ICD-10-CM

## 2024-11-08 DIAGNOSIS — Z12.5 SCREENING FOR PROSTATE CANCER: ICD-10-CM

## 2024-11-08 DIAGNOSIS — K21.9 GASTROESOPHAGEAL REFLUX DISEASE WITHOUT ESOPHAGITIS: ICD-10-CM

## 2024-11-08 DIAGNOSIS — R73.03 PREDIABETES: ICD-10-CM

## 2024-11-08 DIAGNOSIS — Z98.890 HISTORY OF CAROTID ENDARTERECTOMY: ICD-10-CM

## 2024-11-08 PROBLEM — Z91.89 AT RISK FOR CORONARY ARTERY DISEASE: Status: ACTIVE | Noted: 2017-11-21

## 2024-11-08 PROBLEM — I65.21 CAROTID STENOSIS, ASYMPTOMATIC, RIGHT: Status: ACTIVE | Noted: 2019-07-29

## 2024-11-08 PROBLEM — K11.20 SIALOADENITIS: Status: ACTIVE | Noted: 2018-08-06

## 2024-11-08 LAB
25(OH)D3 SERPL-MCNC: 52.7 NG/ML (ref 30–100)
ALBUMIN SERPL-MCNC: 4.1 G/DL (ref 3.5–5.2)
ALBUMIN/GLOB SERPL: 1.1 G/DL
ALP SERPL-CCNC: 72 U/L (ref 39–117)
ALT SERPL W P-5'-P-CCNC: 10 U/L (ref 1–41)
ANION GAP SERPL CALCULATED.3IONS-SCNC: 10 MMOL/L (ref 5–15)
AST SERPL-CCNC: 18 U/L (ref 1–40)
BASOPHILS # BLD AUTO: 0.06 10*3/MM3 (ref 0–0.2)
BASOPHILS NFR BLD AUTO: 0.9 % (ref 0–1.5)
BILIRUB SERPL-MCNC: 0.5 MG/DL (ref 0–1.2)
BUN SERPL-MCNC: 16 MG/DL (ref 8–23)
BUN/CREAT SERPL: 15.1 (ref 7–25)
CALCIUM SPEC-SCNC: 10 MG/DL (ref 8.6–10.5)
CHLORIDE SERPL-SCNC: 99 MMOL/L (ref 98–107)
CHOLEST SERPL-MCNC: 187 MG/DL (ref 0–200)
CO2 SERPL-SCNC: 29 MMOL/L (ref 22–29)
CREAT SERPL-MCNC: 1.06 MG/DL (ref 0.76–1.27)
DEPRECATED RDW RBC AUTO: 40.5 FL (ref 37–54)
EGFRCR SERPLBLD CKD-EPI 2021: 77.4 ML/MIN/1.73
EOSINOPHIL # BLD AUTO: 0.37 10*3/MM3 (ref 0–0.4)
EOSINOPHIL NFR BLD AUTO: 5.3 % (ref 0.3–6.2)
ERYTHROCYTE [DISTWIDTH] IN BLOOD BY AUTOMATED COUNT: 13 % (ref 12.3–15.4)
GLOBULIN UR ELPH-MCNC: 3.6 GM/DL
GLUCOSE SERPL-MCNC: 95 MG/DL (ref 65–99)
HBA1C MFR BLD: 5.7 % (ref 4.8–5.6)
HCT VFR BLD AUTO: 44.5 % (ref 37.5–51)
HDLC SERPL-MCNC: 47 MG/DL (ref 40–60)
HGB BLD-MCNC: 14.9 G/DL (ref 13–17.7)
IMM GRANULOCYTES # BLD AUTO: 0.01 10*3/MM3 (ref 0–0.05)
IMM GRANULOCYTES NFR BLD AUTO: 0.1 % (ref 0–0.5)
LDLC SERPL CALC-MCNC: 113 MG/DL (ref 0–100)
LDLC/HDLC SERPL: 2.32 {RATIO}
LYMPHOCYTES # BLD AUTO: 3.3 10*3/MM3 (ref 0.7–3.1)
LYMPHOCYTES NFR BLD AUTO: 47.7 % (ref 19.6–45.3)
MCH RBC QN AUTO: 28.8 PG (ref 26.6–33)
MCHC RBC AUTO-ENTMCNC: 33.5 G/DL (ref 31.5–35.7)
MCV RBC AUTO: 85.9 FL (ref 79–97)
MONOCYTES # BLD AUTO: 0.54 10*3/MM3 (ref 0.1–0.9)
MONOCYTES NFR BLD AUTO: 7.8 % (ref 5–12)
NEUTROPHILS NFR BLD AUTO: 2.64 10*3/MM3 (ref 1.7–7)
NEUTROPHILS NFR BLD AUTO: 38.2 % (ref 42.7–76)
NRBC BLD AUTO-RTO: 0 /100 WBC (ref 0–0.2)
PLATELET # BLD AUTO: 265 10*3/MM3 (ref 140–450)
PMV BLD AUTO: 9.9 FL (ref 6–12)
POTASSIUM SERPL-SCNC: 3.8 MMOL/L (ref 3.5–5.2)
PROT SERPL-MCNC: 7.7 G/DL (ref 6–8.5)
PSA SERPL-MCNC: 0.33 NG/ML (ref 0–4)
RBC # BLD AUTO: 5.18 10*6/MM3 (ref 4.14–5.8)
SODIUM SERPL-SCNC: 138 MMOL/L (ref 136–145)
T4 FREE SERPL-MCNC: 1.18 NG/DL (ref 0.92–1.68)
TRIGL SERPL-MCNC: 154 MG/DL (ref 0–150)
TSH SERPL DL<=0.05 MIU/L-ACNC: 3.58 UIU/ML (ref 0.27–4.2)
VIT B12 BLD-MCNC: >2000 PG/ML (ref 211–946)
VLDLC SERPL-MCNC: 27 MG/DL (ref 5–40)
WBC NRBC COR # BLD AUTO: 6.92 10*3/MM3 (ref 3.4–10.8)

## 2024-11-08 PROCEDURE — 84439 ASSAY OF FREE THYROXINE: CPT | Performed by: FAMILY MEDICINE

## 2024-11-08 PROCEDURE — 80053 COMPREHEN METABOLIC PANEL: CPT | Performed by: FAMILY MEDICINE

## 2024-11-08 PROCEDURE — 80061 LIPID PANEL: CPT | Performed by: FAMILY MEDICINE

## 2024-11-08 PROCEDURE — 83036 HEMOGLOBIN GLYCOSYLATED A1C: CPT | Performed by: FAMILY MEDICINE

## 2024-11-08 PROCEDURE — 82607 VITAMIN B-12: CPT | Performed by: FAMILY MEDICINE

## 2024-11-08 PROCEDURE — 85025 COMPLETE CBC W/AUTO DIFF WBC: CPT | Performed by: FAMILY MEDICINE

## 2024-11-08 PROCEDURE — G0103 PSA SCREENING: HCPCS | Performed by: FAMILY MEDICINE

## 2024-11-08 PROCEDURE — 84443 ASSAY THYROID STIM HORMONE: CPT | Performed by: FAMILY MEDICINE

## 2024-11-08 PROCEDURE — 82306 VITAMIN D 25 HYDROXY: CPT | Performed by: FAMILY MEDICINE

## 2024-11-08 NOTE — PROGRESS NOTES
Subjective   The ABCs of the Annual Wellness Visit  Medicare Wellness Visit      Rico Armstrong is a 64 y.o. patient who presents for a Medicare Wellness Visit.    The following portions of the patient's history were reviewed and   updated as appropriate: allergies, current medications, past family history, past medical history, past social history, past surgical history, and problem list.    Compared to one year ago, the patient's physical   health is the same.  Compared to one year ago, the patient's mental   health is the same.    Recent Hospitalizations:  He was not admitted to the hospital during the last year.     Current Medical Providers:  Patient Care Team:  Minh Shay DO as PCP - General (Family Medicine)    Outpatient Medications Prior to Visit   Medication Sig Dispense Refill    Aspirin Low Dose 81 MG EC tablet       atorvastatin (Lipitor) 80 MG tablet Take 1 tablet by mouth Daily. 90 tablet 3    cetirizine (zyrTEC) 10 MG tablet       cholecalciferol (VITAMIN D3) 25 MCG (1000 UT) tablet       Dupilumab (Dupixent) 300 MG/2ML solution prefilled syringe Inject 2 mL under the skin into the appropriate area as directed 1 (One) Time Per Week. 4 each 5    ezetimibe (ZETIA) 10 MG tablet Take 1 tablet by mouth Daily. 60 tablet 5    fluticasone (Flonase) 50 MCG/ACT nasal spray 2 sprays into the nostril(s) as directed by provider Daily. 16 g 3    guaiFENesin (Mucinex) 600 MG 12 hr tablet Take 2 tablets by mouth 2 (Two) Times a Day for 14 days. 56 tablet 0    hydroCHLOROthiazide (HYDRODIURIL) 25 MG tablet       levothyroxine (Synthroid) 88 MCG tablet Take 1 tablet by mouth Daily. 90 tablet 3    multivitamin (THERAGRAN) tablet tablet Take  by mouth Daily.      Omega-3 Fatty Acids (fish oil) 1000 MG capsule capsule Take  by mouth Daily.      promethazine-dextromethorphan (PROMETHAZINE-DM) 6.25-15 MG/5ML syrup Take 5 mL by mouth 4 (Four) Times a Day As Needed for Cough. 120 mL 0    Sod Fluoride-Potassium  Nitrate (PreviDent 5000 Sensitive) 1.1-5 % paste Prevident 5000 Enamel Protect 1.1-5 % dental paste Brush before bed and do not rinse   Active      Vonoprazan Fumarate (Voquezna) 20 MG tablet Take 1 tablet by mouth Daily. 90 tablet 1    Cyanocobalamin ER 1000 MCG tablet controlled-release Take  by mouth Daily.      Vonoprazan Fumarate (Voquezna) 20 MG tablet Take 1 tablet by mouth Daily for 10 days. 10 tablet 0     No facility-administered medications prior to visit.     No opioid medication identified on active medication list. I have reviewed chart for other potential  high risk medication/s and harmful drug interactions in the elderly.      Aspirin is on active medication list. Aspirin use is indicated based on review of current medical condition/s. Pros and cons of this therapy have been discussed today. Benefits of this medication outweigh potential harm.  Patient has been encouraged to continue taking this medication.  .      Patient Active Problem List   Diagnosis    Colon cancer screening    Esophageal reflux    HLD (hyperlipidemia)    HTN (hypertension)    Hypertension, benign    Hypothyroid    Malignant neoplasm of ear, nose, and throat    Need for hepatitis C screening test    CHERRY (obstructive sleep apnea)    Other acute sinusitis    Palpitations    Paroxysmal supraventricular tachycardia    Tachycardia    Tonsil cancer    Heartburn    Esophagitis, eosinophilic    Hiatal hernia    Carotid stenosis, asymptomatic, right    Asymptomatic cerebrovascular disease    At risk for coronary artery disease    Sialoadenitis    Xerostomia    History of carotid endarterectomy    Dysphagia    Coronary artery disease involving native coronary artery of native heart without angina pectoris     Advance Care Planning Advance Directive is not on file.  ACP discussion was held with the patient during this visit. Patient does not have an advance directive, information provided.            Objective   Vitals:    11/08/24 0800  "  BP: 162/80   BP Location: Right arm   Patient Position: Sitting   Pulse: 82   SpO2: 96%   Weight: 96.8 kg (213 lb 6.4 oz)   Height: 175.3 cm (69\")   PainSc: 0-No pain       Estimated body mass index is 31.51 kg/m² as calculated from the following:    Height as of this encounter: 175.3 cm (69\").    Weight as of this encounter: 96.8 kg (213 lb 6.4 oz).            Does the patient have evidence of cognitive impairment? No                                                                                                Health  Risk Assessment    Smoking Status:  Social History     Tobacco Use   Smoking Status Former    Current packs/day: 0.00    Average packs/day: 2.0 packs/day for 5.0 years (10.0 ttl pk-yrs)    Types: Cigarettes    Start date:     Quit date:     Years since quittin.8    Passive exposure: Past   Smokeless Tobacco Never   Tobacco Comments    AGE [age]     Alcohol Consumption:  Social History     Substance and Sexual Activity   Alcohol Use Not Currently    Comment: RARE SOCIALLY       Fall Risk Screen  STEADI Fall Risk Assessment was completed, and patient is at LOW risk for falls.Assessment completed on:2024    Depression Screenin/8/2024     8:05 AM   PHQ-2/PHQ-9 Depression Screening   Little interest or pleasure in doing things Not at all   Feeling down, depressed, or hopeless Not at all   How difficult have these problems made it for you to do your work, take care of things at home, or get along with other people? Not difficult at all     Health Habits and Functional and Cognitive Screenin/8/2024     8:08 AM   Functional & Cognitive Status   Do you have difficulty preparing food and eating? No   Do you have difficulty bathing yourself, getting dressed or grooming yourself? No   Do you have difficulty using the toilet? No   Do you have difficulty moving around from place to place? No   Do you have trouble with steps or getting out of a bed or a chair? No   Current " Diet Well Balanced Diet   Dental Exam Up to date   Eye Exam Up to date   Exercise (times per week) 3 times per week   Current Exercises Include Gardening   Do you need help using the phone?  No   Are you deaf or do you have serious difficulty hearing?  No   Do you need help to go to places out of walking distance? No   Do you need help shopping? No   Do you need help preparing meals?  No   Do you need help with housework?  No   Do you need help with laundry? No   Do you need help taking your medications? No   Do you need help managing money? No   Do you ever drive or ride in a car without wearing a seat belt? No   Have you felt unusual stress, anger or loneliness in the last month? No   Who do you live with? Spouse   If you need help, do you have trouble finding someone available to you? No   Have you been bothered in the last four weeks by sexual problems? No   Do you have difficulty concentrating, remembering or making decisions? No           Age-appropriate Screening Schedule:  Refer to the list below for future screening recommendations based on patient's age, sex and/or medical conditions. Orders for these recommended tests are listed in the plan section. The patient has been provided with a written plan.    Health Maintenance List  Health Maintenance   Topic Date Due    COVID-19 Vaccine (6 - 2024-25 season) 09/01/2024    BMI FOLLOWUP  01/22/2025    LIPID PANEL  08/01/2025    ANNUAL WELLNESS VISIT  11/08/2025    COLORECTAL CANCER SCREENING  05/02/2028    TDAP/TD VACCINES (4 - Td or Tdap) 12/13/2033    HEPATITIS C SCREENING  Completed    INFLUENZA VACCINE  Completed    Pneumococcal Vaccine 65+  Completed    AAA SCREEN (ONE-TIME)  Completed    ZOSTER VACCINE  Completed                                                                                                                                                CMS Preventative Services Quick Reference  Risk Factors Identified During  Encounter  Inactivity/Sedentary: Patient was advised to exercise at least 150 minutes a week per CDC recommendations.    The above risks/problems have been discussed with the patient.  Pertinent information has been shared with the patient in the After Visit Summary.  An After Visit Summary and PPPS were made available to the patient.    Follow Up:   Next Medicare Wellness visit to be scheduled in 1 year.          Additional E&M Note during same encounter follows:  Patient has multiple medical problems which are significant and separately identifiable that require additional work above and beyond the Medicare Wellness Visit.      Chief Complaint  Annual Exam    Rico Armstrong is a 64 y.o. male who presents to [facility]     History of Present Illness  The patient is a 64-year-old male who presents for an annual medical wellness visit.    He has been managing reflux with Nexium since 2008 but recently experienced a few flare-ups. He consulted a gastroenterologist earlier this week and was prescribed a new medication, Voquezna, which he plans to try. He has a hiatal hernia and experiences difficulty swallowing due to esophageal stenosis. This condition was exacerbated by radiation treatment for throat cancer, which caused further narrowing of his esophagus. An ENT specialist, Dr. Harry, performed a procedure to stretch his esophagus several years ago. Since then, he has been eating slowly and chewing food thoroughly. However, he still experiences episodes once or twice a year where food gets stuck in his throat, obstructing his airway. He has purchased a device to help dislodge food in case this happens when he is alone. He also has eosinophilic esophagitis and was prescribed Dupixent, which he has not yet started. He had an EGD performed by Dr. Garcia a few months ago but was not informed about the results until he saw Dr. Cervantes. He was frustrated that he was not informed  "about the findings or given any recommendations or prescriptions.    He has been taking hydrochlorothiazide 25 mg for high blood pressure, which has been slightly elevated during his last few checks. He has not had a heart attack or stroke but had a right endarterectomy in 2019 due to carotid artery disease. He sees Dr. Fonseca annually for an ultrasound. He does not monitor his blood pressure at home and reports no increased stress. He admits to consuming more salt than recommended. He takes his hydrochlorothiazide mid-morning.    He takes over-the-counter B12 tablets and Flonase for allergies. 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 He has cut out sugar from his diet and drinks Dr. Pepper Zero. He does not drink much water but plans to increase his intake.    His physical health has remained stable over the past year, except for a dislocated ankle in July 2024. His mental health has also remained stable, with no issues of depression. He has not been hospitalized overnight in the past year. He has not discussed advanced care directives or a living will.    He had a loop recorder in his chest for 2 years, which was removed after no episodes were detected. He was referred to a cardiologist, Dr. Ortez, due to his family history of coronary artery disease. He was scheduled for a treadmill stress test, but it was postponed due to his dislocated ankle. He has an appointment with Dr. Ortez on December 4, 2024. He has an appointment with a podiatrist on November 11, 2024.    He had a colonoscopy in 2018 and was advised to repeat it in 10 years.    FAMILY HISTORY  His mother and all of her siblings had quadruple bypass.    Objective   Vital Signs:   Vitals:    11/08/24 0800   BP: 162/80   BP Location: Right arm   Patient Position: Sitting   Pulse: 82   SpO2: 96%   Weight: 96.8 kg (213 lb 6.4 oz)   Height: 175.3 cm (69\")   PainSc: 0-No pain       Wt Readings from Last 3 Encounters:   11/08/24 " 96.8 kg (213 lb 6.4 oz)   11/06/24 100 kg (220 lb 12.8 oz)   10/31/24 99.6 kg (219 lb 8 oz)     BP Readings from Last 3 Encounters:   11/08/24 162/80   11/06/24 172/67   10/31/24 156/82       Physical Exam    The following data was reviewed by Crescencio Dolan DO on 11/08/2024        Assessment & Plan   Diagnoses and all orders for this visit:    1. Medicare annual wellness visit, initial (Primary)    2. Gastroesophageal reflux disease without esophagitis    3. Hiatal hernia    4. Dysphagia, unspecified type    5. Esophagitis, eosinophilic    6. History of carotid endarterectomy    7. Coronary artery disease involving native coronary artery of native heart without angina pectoris    8. Mixed hyperlipidemia  -     Lipid Panel    9. Essential hypertension    10. Prediabetes  -     CBC & Differential  -     Comprehensive Metabolic Panel  -     Hemoglobin A1c    11. B12 deficiency  -     Vitamin B12    12. Vitamin D deficiency  -     Vitamin D,25-Hydroxy    13. Hypothyroidism, unspecified type  -     TSH+Free T4    14. Screening for prostate cancer  -     PSA Screen        Assessment & Plan  1. Gastroesophageal Reflux Disease (GERD).  He has been experiencing flare-ups recently and was prescribed a new time-release medication by his gastroenterologist. He will continue with Gaviscon for breakthrough symptoms at night. He has also been prescribed Dupixent for eosinophilic esophagitis, which has been approved and will be sent to him soon. He is advised to continue monitoring his symptoms and follow up with his gastroenterologist if there are any issues.    2. Hiatal Hernia.  He has a known hiatal hernia which may contribute to his GERD symptoms. Surgery is not currently recommended due to potential complications. He is advised to continue with his current management plan and monitor his symptoms.    3. Hypertension.  His blood pressure has been consistently high, ranging from 150s to 160s systolic over the past two months.  He is currently on hydrochlorothiazide. He was advised to monitor his blood pressure at home twice daily and maintain a log for review at his next appointment with Dr. Shay as well as with Dr Ortez (cardiology). Dietary changes, including reducing salt intake, and regular exercise, at least 30 minutes five days a week, were recommended. He was also advised to bring his blood pressure log to his upcoming appointment with Dr. Ortez.  Patient was put on a second blood pressure medication [facility] and started experiencing dizziness upon standing.  He is hesitant to start a second medication today due to this.    4. Hyperlipidemia.  His cholesterol levels are elevated despite being on high doses of atorvastatin 80 mg and Zetia 10 mg. Given his history of carotid artery disease, he may be a suitable candidate for Repatha injections every two weeks. He is advised to make dietary changes and monitor his cholesterol levels at his next appointment with Dr. Shay in February.    5. Eosinophilic Esophagitis.  He has been diagnosed with eosinophilic esophagitis and prescribed Dupixent, which has been approved and will be sent to him soon. He is advised to follow up with his gastroenterologist if there are any issues.    6. Hypothyroidism.  His thyroid function was assessed in August 2024, with no changes recommended for his current levothyroxine dosage of 88 mcg. He is advised to follow up with Dr. Shay for further assessment.    7. Vitamin B12 Supplementation.  His B12 levels are elevated. He was advised to discontinue B12 supplementation as B12 stays in the system for a long time.    8. Carotid Artery Disease.  He had an endarterectomy on the right side in 2019 and sees Dr. Fonseca once a year for follow-up with an ultrasound. He is advised to continue with his current management plan and follow up with Dr. Fonseca in December.  Patient also seeing cardiology now.  Reviewed note, patient has another  appointment in December.    9. Allergies.  He is currently on Zyrtec and Flonase for allergies. He is advised to continue with his current regimen.    10. Dry Mouth Syndrome.  He uses Prevident paste due to dry mouth syndrome caused by radiation therapy. He is advised to continue with his current management plan.    11. Health Maintenance.  He had a colonoscopy in 2018 with a recommendation for a repeat in 10 years. His vision will be checked during this visit.    Follow-up  Return in 3 months for follow-up with Dr. Shay            FOLLOW UP  Return for Next scheduled follow up.  Patient was given instructions and counseling regarding his condition or for health maintenance advice. Please see specific information pulled into the AVS if appropriate.     Crescencio Dolan DO  11/08/24  09:17 EST    Patient or patient representative verbalized consent for the use of Ambient Listening during the visit with  Crescencio Dolan DO for chart documentation. 11/8/2024  08:39 EST

## 2024-11-11 ENCOUNTER — OFFICE VISIT (OUTPATIENT)
Dept: PODIATRY | Facility: CLINIC | Age: 64
End: 2024-11-11
Payer: MEDICARE

## 2024-11-11 VITALS
WEIGHT: 210 LBS | OXYGEN SATURATION: 94 % | HEIGHT: 69 IN | DIASTOLIC BLOOD PRESSURE: 77 MMHG | SYSTOLIC BLOOD PRESSURE: 146 MMHG | HEART RATE: 76 BPM | BODY MASS INDEX: 31.1 KG/M2

## 2024-11-11 DIAGNOSIS — S93.315S: ICD-10-CM

## 2024-11-11 DIAGNOSIS — S93.314S: Primary | ICD-10-CM

## 2024-11-11 DIAGNOSIS — M15.3 POST-TRAUMATIC OSTEOARTHRITIS OF MULTIPLE JOINTS: ICD-10-CM

## 2024-11-14 NOTE — PROGRESS NOTES
[facility]    Today's Date: 11/14/24    Patient Name: Rico Armstrong  MRN: [id number]  CSN: [id number]  PCP: Minh Shay DO,   Referring Provider: Minh Shay DO    SUBJECTIVE     Chief Complaint   Patient presents with    Right Ankle - Establish Care     Dislocation in 7/5/24, wore a splint and boot   Was seeing Dr. Stone   No pain in ankle now     Right Foot - Establish Care, Pain     Heel pain and top of foot pain started after ankle injury        HPI: Rico Armstrong, a 64 y.o.male, presents to clinic.    Patient 64-year-old male presenting with right foot pain.  Patient had a dislocation on 7/5/2024.  His foot was relocated and he was placed in a splint.  He has been treated conservatively for the last 3 to 4 months and is still having issues and pain to the top and bottom of his foot.  Ankle has improved.  He is here for further treatment.    Past Medical History:   Diagnosis Date    Allergies     Esophageal reflux     Foot pain, right     HLD (hyperlipidemia)     HTN (hypertension)     Hypothyroid     CHERRY (obstructive sleep apnea) 02/14/2019    Throat cancer     Tonsil cancer     SEE DR ELY WILKS AT [facility]. DR MCKINLEY RIGGS AND DR RAHUL GUEVARA FOR CANCER REHAB SQUAMOUS CELL CARCINOMA     Past Surgical History:   Procedure Laterality Date    APPENDECTOMY      COLONOSCOPY  05/02/2018    ENDOSCOPY N/A 5/29/2024    Procedure: ESOPHAGOGASTRODUODENOSCOPY WITH BIOPSIES;  Surgeon: Ryan Woods MD;  Location: [facility] ENDOSCOPY;  Service: Gastroenterology;  Laterality: N/A;  GASTRIC POLYP/HIATAL HERNIA    GASTROSTOMY FEEDING TUBE INSERTION      KNEE SURGERY      NOSE SURGERY      CARTLIAGE GRAFT AUTOGENOUS, TO NOSE FROM RIB    PORTACATH PLACEMENT      SKIN CANCER EXCISION      LEFT EAR    UPPER GASTROINTESTINAL ENDOSCOPY       Family History   Problem Relation Age of Onset    Stroke Other     Alzheimer's disease Other     Heart disease Other      Diabetes Other     Colon cancer Neg Hx      Social History     Socioeconomic History    Marital status:    Tobacco Use    Smoking status: Former     Current packs/day: 0.00     Average packs/day: 2.0 packs/day for 5.0 years (10.0 ttl pk-yrs)     Types: Cigarettes     Start date:      Quit date:      Years since quittin.8     Passive exposure: Past    Smokeless tobacco: Never    Tobacco comments:     AGE [age]   Vaping Use    Vaping status: Never Used   Substance and Sexual Activity    Alcohol use: Not Currently     Comment: RARE SOCIALLY    Drug use: Defer    Sexual activity: Defer     No Known Allergies  Current Outpatient Medications   Medication Sig Dispense Refill    Aspirin Low Dose 81 MG EC tablet       atorvastatin (Lipitor) 80 MG tablet Take 1 tablet by mouth Daily. 90 tablet 3    cetirizine (zyrTEC) 10 MG tablet       cholecalciferol (VITAMIN D3) 25 MCG (1000 UT) tablet       Dupilumab (Dupixent) 300 MG/2ML solution prefilled syringe Inject 2 mL under the skin into the appropriate area as directed 1 (One) Time Per Week. 4 each 5    ezetimibe (ZETIA) 10 MG tablet Take 1 tablet by mouth Daily. 60 tablet 5    fluticasone (Flonase) 50 MCG/ACT nasal spray 2 sprays into the nostril(s) as directed by provider Daily. 16 g 3    guaiFENesin (Mucinex) 600 MG 12 hr tablet Take 2 tablets by mouth 2 (Two) Times a Day for 14 days. 56 tablet 0    hydroCHLOROthiazide (HYDRODIURIL) 25 MG tablet       levothyroxine (Synthroid) 88 MCG tablet Take 1 tablet by mouth Daily. 90 tablet 3    multivitamin (THERAGRAN) tablet tablet Take  by mouth Daily.      Omega-3 Fatty Acids (fish oil) 1000 MG capsule capsule Take  by mouth Daily.      promethazine-dextromethorphan (PROMETHAZINE-DM) 6.25-15 MG/5ML syrup Take 5 mL by mouth 4 (Four) Times a Day As Needed for Cough. 120 mL 0    Sod Fluoride-Potassium Nitrate (PreviDent 5000 Sensitive) 1.1-5 % paste Prevident 5000 Enamel Protect 1.1-5 % dental paste Brush before bed  and do not rinse   Active      Vonoprazan Fumarate (Voquezna) 20 MG tablet Take 1 tablet by mouth Daily. 90 tablet 1     No current facility-administered medications for this visit.     Review of Systems   Musculoskeletal:  Positive for arthralgias.   All other systems reviewed and are negative.      OBJECTIVE     Vitals:    11/11/24 0902   BP: 146/77   Pulse: 76   SpO2: 94%       WBC   Date Value Ref Range Status   11/08/2024 6.92 3.40 - 10.80 10*3/mm3 Final   07/30/2019 10.43 4.5 - 11.0 10*3/uL Final     RBC   Date Value Ref Range Status   11/08/2024 5.18 4.14 - 5.80 10*6/mm3 Final   07/30/2019 4.21 (L) 4.5 - 5.9 10*6/uL Final     Hemoglobin   Date Value Ref Range Status   11/08/2024 14.9 13.0 - 17.7 g/dL Final   07/30/2019 12.2 (L) 13.5 - 17.5 g/dL Final     Hematocrit   Date Value Ref Range Status   11/08/2024 44.5 37.5 - 51.0 % Final   07/30/2019 38.1 (L) 41.0 - 53.0 % Final     MCV   Date Value Ref Range Status   11/08/2024 85.9 79.0 - 97.0 fL Final   07/30/2019 90.5 80.0 - 100.0 fL Final     MCH   Date Value Ref Range Status   11/08/2024 28.8 26.6 - 33.0 pg Final   07/30/2019 29.0 26.0 - 34.0 pg Final     MCHC   Date Value Ref Range Status   11/08/2024 33.5 31.5 - 35.7 g/dL Final   07/30/2019 32.0 31.0 - 37.0 g/dL Final     RDW   Date Value Ref Range Status   11/08/2024 13.0 12.3 - 15.4 % Final   07/30/2019 13.9 12.0 - 16.8 % Final     RDW-SD   Date Value Ref Range Status   11/08/2024 40.5 37.0 - 54.0 fl Final     MPV   Date Value Ref Range Status   11/08/2024 9.9 6.0 - 12.0 fL Final   07/30/2019 10.3 6.7 - 10.8 fL Final     Platelets   Date Value Ref Range Status   11/08/2024 265 140 - 450 10*3/mm3 Final   07/30/2019 264 140 - 440 10*3/uL Final     Neutrophil Rel %   Date Value Ref Range Status   07/30/2019 64.7 45 - 80 % Final     Neutrophil %   Date Value Ref Range Status   11/08/2024 38.2 (L) 42.7 - 76.0 % Final     Lymphocyte Rel %   Date Value Ref Range Status   07/30/2019 24.7 15 - 50 % Final      Lymphocyte %   Date Value Ref Range Status   11/08/2024 47.7 (H) 19.6 - 45.3 % Final     Monocyte Rel %   Date Value Ref Range Status   07/30/2019 9.9 0 - 15 % Final     Monocyte %   Date Value Ref Range Status   11/08/2024 7.8 5.0 - 12.0 % Final     Eosinophil %   Date Value Ref Range Status   11/08/2024 5.3 0.3 - 6.2 % Final   07/30/2019 0.4 0 - 7 % Final     Basophil Rel %   Date Value Ref Range Status   07/30/2019 0.1 0 - 2 % Final     Basophil %   Date Value Ref Range Status   11/08/2024 0.9 0.0 - 1.5 % Final     Immature Grans %   Date Value Ref Range Status   11/08/2024 0.1 0.0 - 0.5 % Final   07/30/2019 0.2 (H) 0 % Final     Neutrophils Absolute   Date Value Ref Range Status   07/30/2019 6.75 2.0 - 8.8 10*3/uL Final     Neutrophils, Absolute   Date Value Ref Range Status   11/08/2024 2.64 1.70 - 7.00 10*3/mm3 Final     Lymphocytes Absolute   Date Value Ref Range Status   07/30/2019 2.58 0.7 - 5.5 10*3/uL Final     Lymphocytes, Absolute   Date Value Ref Range Status   11/08/2024 3.30 (H) 0.70 - 3.10 10*3/mm3 Final     Monocytes Absolute   Date Value Ref Range Status   07/30/2019 1.03 0.0 - 1.7 10*3/uL Final     Monocytes, Absolute   Date Value Ref Range Status   11/08/2024 0.54 0.10 - 0.90 10*3/mm3 Final     Eosinophils Absolute   Date Value Ref Range Status   07/30/2019 0.04 0.0 - 0.8 10*3/uL Final     Eosinophils, Absolute   Date Value Ref Range Status   11/08/2024 0.37 0.00 - 0.40 10*3/mm3 Final     Basophils Absolute   Date Value Ref Range Status   07/30/2019 0.01 0.0 - 0.2 10*3/uL Final     Basophils, Absolute   Date Value Ref Range Status   11/08/2024 0.06 0.00 - 0.20 10*3/mm3 Final     Immature Grans, Absolute   Date Value Ref Range Status   11/08/2024 0.01 0.00 - 0.05 10*3/mm3 Final   07/30/2019 0.02 <1 10*3/uL Final     nRBC   Date Value Ref Range Status   11/08/2024 0.0 0.0 - 0.2 /100 WBC Final         Lab Results   Component Value Date    GLUCOSE 95 11/08/2024    BUN 16 11/08/2024    CREATININE  1.06 11/08/2024    BCR 15.1 11/08/2024    K 3.8 11/08/2024    CO2 29.0 11/08/2024    CALCIUM 10.0 11/08/2024    ALBUMIN 4.1 11/08/2024    LABIL2 1.5 07/29/2019    AST 18 11/08/2024    ALT 10 11/08/2024       Patient seen in no apparent distress.      PHYSICAL EXAM:     Foot/Ankle Exam    GENERAL  Appearance:  appears stated age  Orientation:  AAOx3  Affect:  appropriate  Gait:  unimpaired  Assistance:  independent  Right shoe gear: casual shoe  Left shoe gear: casual shoe    VASCULAR     Right Foot Vascularity   Normal vascular exam    Dorsalis pedis:  2+  Posterior tibial:  2+  Skin temperature:  warm  Edema grading:  None  CFT:  < 3 seconds  Pedal hair growth:  Present  Varicosities:  none     Left Foot Vascularity   Normal vascular exam    Dorsalis pedis:  2+  Posterior tibial:  2+  Skin temperature:  warm  Edema grading:  None  CFT:  < 3 seconds  Pedal hair growth:  Present  Varicosities:  none     NEUROLOGIC     Right Foot Neurologic   Normal sensation    Light touch sensation: normal  Vibratory sensation: normal  Hot/Cold sensation: normal  Protective Sensation using [place] Monofilament:   Sites intact: 10  Sites tested: 10     Left Foot Neurologic   Normal sensation    Light touch sensation: normal  Vibratory sensation: normal  Hot/Cold sensation:  normal  Protective Sensation using [place] Monofilament:   Sites intact: 10  Sites tested: 10    MUSCULOSKELETAL     Right Foot Musculoskeletal   Tenderness:  ankle joint tenderness, calcaneocuboid joint tenderness, calcaneofibular ligament tenderness, deltoid ligament tenderness and subtalar joint tenderness      MUSCLE STRENGTH     Right Foot Muscle Strength   Foot dorsiflexion:  4  Foot plantar flexion:  4  Foot inversion:  4  Foot eversion:  4     Left Foot Muscle Strength   Foot dorsiflexion:  4  Foot plantar flexion:  4  Foot inversion:  4  Foot eversion:  4    RANGE OF MOTION     Right Foot Range of Motion   Foot and ankle ROM within  normal limits       Left Foot Range of Motion   Foot and ankle ROM within normal limits      DERMATOLOGIC      Right Foot Dermatologic   Skin  Right foot skin is intact.      Left Foot Dermatologic   Skin  Left foot skin is intact.       RADIOLOGY:          No results found.    ASSESSMENT/PLAN     Diagnoses and all orders for this visit:    1. Closed dislocation of tarsal joint, right, sequela (Primary)  -     MRI Ankle Right Without Contrast; Future    2. Closed dislocation of tarsal joint of left foot, sequela    3. Post-traumatic osteoarthritis of multiple joints        Comprehensive lower extremity examination and evaluation was performed.    Patient with previous TN and subtalar joint dislocation.  MRI ordered for further evaluation.    Discussed findings and treatment plan including risks, benefits, and treatment options with patient in detail. Patient agreed with treatment plan.    Medications and allergies reviewed.  Reviewed available lab values along with other pertinent labs.  These were discussed with the patient.    An After Visit Summary was printed and given to the patient at discharge, including (if requested) any available informative/educational handouts regarding diagnosis, treatment, or medications. All questions were answered to patient/family satisfaction. Should symptoms fail to improve or worsen they agree to call or return to clinic or to go to the Emergency Department. Discussed the importance of following up with any needed screening tests/labs/specialist appointments and any requested follow-up recommended by me today. Importance of maintaining follow-up discussed and patient accepts that missed appointments can delay diagnosis and potentially lead to worsening of conditions.    No follow-ups on file., or sooner if acute issues arise.    This document has been electronically signed by Gabo Mahajan DPM on November 14, 2024 07:22 EST

## 2024-11-15 ENCOUNTER — TELEPHONE (OUTPATIENT)
Dept: GASTROENTEROLOGY | Facility: CLINIC | Age: 64
End: 2024-11-15
Payer: OTHER GOVERNMENT

## 2024-12-04 ENCOUNTER — TELEPHONE (OUTPATIENT)
Dept: CARDIOLOGY | Facility: CLINIC | Age: 64
End: 2024-12-04

## 2024-12-04 ENCOUNTER — OFFICE VISIT (OUTPATIENT)
Dept: CARDIOLOGY | Facility: CLINIC | Age: 64
End: 2024-12-04
Payer: MEDICARE

## 2024-12-04 VITALS
HEIGHT: 69 IN | SYSTOLIC BLOOD PRESSURE: 151 MMHG | DIASTOLIC BLOOD PRESSURE: 85 MMHG | BODY MASS INDEX: 31.4 KG/M2 | HEART RATE: 75 BPM | WEIGHT: 212 LBS

## 2024-12-04 DIAGNOSIS — E78.5 HYPERLIPIDEMIA LDL GOAL <70: ICD-10-CM

## 2024-12-04 DIAGNOSIS — I25.10 CORONARY ARTERY DISEASE INVOLVING NATIVE CORONARY ARTERY OF NATIVE HEART WITHOUT ANGINA PECTORIS: Primary | ICD-10-CM

## 2024-12-04 RX ORDER — ESOMEPRAZOLE MAGNESIUM 40 MG/1
40 CAPSULE, DELAYED RELEASE ORAL
COMMUNITY
End: 2024-12-09

## 2024-12-04 NOTE — TELEPHONE ENCOUNTER
Caller: Rico Armstrong    Relationship: Self    Best call back number: [phone]     What is the best time to reach you: ANY    Who are you requesting to speak with (clinical staff, provider,  specific staff member): ANY    Do you know the name of the person who called:     What was the call regarding: PATIENT CALLING IN DUE TO NEEDING A PRIOR AUTH FOR REPATHA SENT TO [facility]. PLEASE CALL PHARMACY FOR ANY QUESTIONS. THANK YOU!    Is it okay if the provider responds through [facility]: NO

## 2024-12-04 NOTE — TELEPHONE ENCOUNTER
WANDY TYLER STARTED VIA PHONE WITH [facility] AT [phone].  CASE ID # [id number].   PA APPROVED AND PT NOTIFIED.    APPROVED 11/04/24-12/31/2099.    APPROVAL LETTER COMING VIA FAX

## 2024-12-04 NOTE — PROGRESS NOTES
[facility]  Interventional Cardiology Patient Visit Note      Referring Provider:  No referring provider defined for this encounter.    History of Presenting Illness:  History of Present Illness  Mr. Jack Armstrong is a 64-year-old male with a history of hypertension, hyperlipidemia, peripheral vascular disease, coronary artery disease who presents today for routine follow-up.    He was initially referred to me for risk stratification due to family history of coronary artery disease.  He underwent coronary artery calcium scoring which was remarkable for 107 he was initiated on appropriate therapy and today he presents for follow-up.    He has been adhering to his prescribed cholesterol medication regimen. He is also maintaining a healthy lifestyle by monitoring his diet and engaging in regular physical activity, including walking 2 miles on the treadmill every other day.     He reports no chest pain or shortness of breath during these exercise sessions. He has a scheduled appointment with Dr. Jernigan this month. He does not experience shortness of breath at night.  He also denies orthopnea, PND, peripheral edema, palpitations and syncope to me.          Past Medical History  Past Medical History:   Diagnosis Date    Allergies     Esophageal reflux     Foot pain, right     HLD (hyperlipidemia)     HTN (hypertension)     Hypothyroid     CHERRY (obstructive sleep apnea) 02/14/2019    Throat cancer     Tonsil cancer     SEE DR ELY WILKS AT [facility]. DR MCKINLEY RIGGS AND DR RAHUL GUEVARA FOR CANCER REHAB SQUAMOUS CELL CARCINOMA         Current Outpatient Medications:     Aspirin Low Dose 81 MG EC tablet, , Disp: , Rfl:     atorvastatin (Lipitor) 80 MG tablet, Take 1 tablet by mouth Daily., Disp: 90 tablet, Rfl: 3    cetirizine (zyrTEC) 10 MG tablet, , Disp: , Rfl:     cholecalciferol (VITAMIN D3) 25 MCG (1000 UT) tablet, , Disp: , Rfl:     Dupilumab (Dupixent) 300 MG/2ML  solution prefilled syringe, Inject 2 mL under the skin into the appropriate area as directed 1 (One) Time Per Week., Disp: 4 each, Rfl: 5    esomeprazole (nexIUM) 40 MG capsule, Take 1 capsule by mouth Every Morning Before Breakfast., Disp: , Rfl:     ezetimibe (ZETIA) 10 MG tablet, Take 1 tablet by mouth Daily., Disp: 60 tablet, Rfl: 5    fluticasone (Flonase) 50 MCG/ACT nasal spray, 2 sprays into the nostril(s) as directed by provider Daily., Disp: 16 g, Rfl: 3    hydroCHLOROthiazide (HYDRODIURIL) 25 MG tablet, , Disp: , Rfl:     levothyroxine (Synthroid) 88 MCG tablet, Take 1 tablet by mouth Daily., Disp: 90 tablet, Rfl: 3    multivitamin (THERAGRAN) tablet tablet, Take  by mouth Daily., Disp: , Rfl:     Omega-3 Fatty Acids (fish oil) 1000 MG capsule capsule, Take  by mouth Daily., Disp: , Rfl:     Sod Fluoride-Potassium Nitrate (PreviDent 5000 Sensitive) 1.1-5 % paste, Prevident 5000 Enamel Protect 1.1-5 % dental paste Brush before bed and do not rinse   Active, Disp: , Rfl:     promethazine-dextromethorphan (PROMETHAZINE-DM) 6.25-15 MG/5ML syrup, Take 5 mL by mouth 4 (Four) Times a Day As Needed for Cough. (Patient not taking: Reported on 2024), Disp: 120 mL, Rfl: 0    Vonoprazan Fumarate (Voquezna) 20 MG tablet, Take 1 tablet by mouth Daily. (Patient not taking: Reported on 2024), Disp: 90 tablet, Rfl: 1  Current outpatient and discharge medications have been reconciled for the patient.  Reviewed by: Melchor Ortez MD     There are no discontinued medications.  No Known Allergies   Social History     Tobacco Use    Smoking status: Former     Current packs/day: 0.00     Average packs/day: 2.0 packs/day for 5.0 years (10.0 ttl pk-yrs)     Types: Cigarettes     Start date:      Quit date:      Years since quittin.9     Passive exposure: Past    Smokeless tobacco: Never    Tobacco comments:     [age]   Vaping Use    Vaping status: Never Used   Substance Use Topics    Alcohol use: Not  "Currently     Comment: RARE SOCIALLY    Drug use: Defer     Family History   Problem Relation Age of Onset    Stroke Other     Alzheimer's disease Other     Heart disease Other     Diabetes Other     Colon cancer Neg Hx           Objective   /85 (BP Location: Right arm, Patient Position: Sitting, Cuff Size: Large Adult)   Pulse 75   Ht 175.3 cm (69\")   Wt 96.2 kg (212 lb)   BMI 31.31 kg/m²     Wt Readings from Last 3 Encounters:   12/04/24 96.2 kg (212 lb)   11/11/24 95.3 kg (210 lb)   11/08/24 96.8 kg (213 lb 6.4 oz)     BP Readings from Last 3 Encounters:   12/04/24 151/85   11/11/24 146/77   11/08/24 142/82       Physical Exam  Constitutional:  Awake. Not in acute distress. Normal appearance.   Neck: No carotid bruit, hepatojugular reflux or JVD.   Cardiovascular:      Rate and Rhythm: Normal rate and regular rhythm.      Chest Wall: PMI is not displaced.      Heart sounds: Normal heart sounds, S1 normal and S2 normal. No murmur heard.       No friction rub. No gallop. No S3 or S4 sounds.    Pulmonary: Pulmonary effort is normal. Normal breath sounds. No wheezing, rhonchi or rales.   Extremities: No Bilateral edema is noted.   Skin: Warm and dry. Non cyanotic, No petechiae or rash.   Neurological: Alert and oriented x 3  Psychiatric:  Behavior is cooperative.       Result Review :   The following data was reviewed by Melchor Ortez MD on 12/04/2024   No results found for: \"PROBNP\"  CMP          1/17/2024    11:30 8/1/2024    08:57 11/8/2024    09:10   CMP   Glucose 69  103  95    BUN 16  18  16    Creatinine 1.18  0.98  1.06    EGFR 68.5  85.6  77.4    Sodium 139  138  138    Potassium 3.7  4.1  3.8    Chloride 97  99  99    Calcium 9.8  9.7  10.0    Total Protein 7.6  7.5  7.7    Albumin 4.8  4.5  4.1    Globulin 2.8  3.0  3.6    Total Bilirubin 0.4  0.4  0.5    Alkaline Phosphatase 58  62  72    AST (SGOT) 27  24  18    ALT (SGPT) 17  16  10    Albumin/Globulin Ratio 1.7  1.5  1.1    BUN/Creatinine " "Ratio 13.6  18.4  15.1    Anion Gap 12.8  14.3  10.0      CBC w/diff          1/17/2024    11:30 8/1/2024    08:57 11/8/2024    09:10   CBC w/Diff   WBC 7.03  6.71  6.92    RBC 5.44  5.18  5.18    Hemoglobin 15.3  14.8  14.9    Hematocrit 46.6  45.6  44.5    MCV 85.7  88.0  85.9    MCH 28.1  28.6  28.8    MCHC 32.8  32.5  33.5    RDW 12.8  13.1  13.0    Platelets 302  291  265    Neutrophil Rel % 42.0  48.2  38.2    Immature Granulocyte Rel % 0.1  0.3  0.1    Lymphocyte Rel % 41.1  37.1  47.7    Monocyte Rel % 8.1  8.2  7.8    Eosinophil Rel % 7.7  5.2  5.3    Basophil Rel % 1.0  1.0  0.9       Lab Results   Component Value Date    TSH 3.580 11/08/2024      Lab Results   Component Value Date    FREET4 1.18 11/08/2024      No results found for: \"DDIMERQUANT\"  Magnesium   Date Value Ref Range Status   07/30/2019 2.0 1.6 - 2.3 mg/dL Final      No results found for: \"DIGOXIN\"   No results found for: \"TROPONINT\"   No results found for: \"POCTROP\"       A1C Last 3 Results          1/17/2024    11:30 11/8/2024    09:10   HGBA1C Last 3 Results   Hemoglobin A1C 5.90  5.70      Lipid Panel          8/1/2024    08:57 11/8/2024    09:10   Lipid Panel   Total Cholesterol 211  187    Triglycerides 167  154    HDL Cholesterol 50  47    VLDL Cholesterol 30  27    LDL Cholesterol  131  113    LDL/HDL Ratio 2.55  2.32      Results for orders placed during the hospital encounter of 09/12/24    Adult Transthoracic Echo Complete W/ Cont if Necessary Per Protocol    Interpretation Summary  The quality of the study is suboptimal.    Grossly normal chamber sizes.  LV has normal wall thickness.  No regional wall motion abnormalities are noted calculated LVEF is greater than 55%. Mildly abnormal diastolic function with normal LAP.  Valves are not well-visualized.  Probably trileaflet aortic valve.  There is no aortic valve stenosis visually  There is no significant TR noted.  RVSP therefore cannot be estimated.  No pericardial effusion is " present.  IVC has normal size.    No prior studies available for comparison.     Results for orders placed during the hospital encounter of 09/12/24    Adult Transthoracic Echo Complete W/ Cont if Necessary Per Protocol    Interpretation Summary  The quality of the study is suboptimal.    Grossly normal chamber sizes.  LV has normal wall thickness.  No regional wall motion abnormalities are noted calculated LVEF is greater than 55%. Mildly abnormal diastolic function with normal LAP.  Valves are not well-visualized.  Probably trileaflet aortic valve.  There is no aortic valve stenosis visually  There is no significant TR noted.  RVSP therefore cannot be estimated.  No pericardial effusion is present.  IVC has normal size.    No prior studies available for comparison.     No results found for this or any previous visit.        The 10-year ASCVD risk score (Mario DK, et al., 2019) is: 20.6%    Values used to calculate the score:      Age: 64 years      Sex: Male      Is Non- : No      Diabetic: No      Tobacco smoker: No      Systolic Blood Pressure: 151 mmHg      Is BP treated: Yes      HDL Cholesterol: 47 mg/dL      Total Cholesterol: 187 mg/dL        Assessment  Assessment & Plan    1. Hypercholesterolemia.  Despite adherence to his cholesterol medication, his lipid levels remain suboptimal. His cardiac ultrasound results were satisfactory, indicating good heart function. However, his calcium score and other risk factors suggest an elevated risk for cardiovascular events.   Repatha injections have been prescribed, to be administered once every two weeks or monthly, depending on his preference.  The potential side effects of Repatha, including flu-like symptoms, were discussed.   He was advised to discontinue Zetia, while the continuation of Lipitor was left to his discretion. His cholesterol levels will be reassessed in 02/2025.    2a.  Coronary artery disease  2b.  Peripheral vascular  disease status post right carotid artery endarterectomy  Based on calcium score patient's Ryan score was estimated he has a 17% risk of major adverse cardiac event which is considered high risk.  A stress test is scheduled for assessment of functional capacity and for areas of reversible ischemia.  Dietary and lifestyle modification was reinforced today  Continue aspirin 81 mg p.o. daily along with above-mentioned lipid regimen.    3.  Hypertension  Blood pressure is optimally controlled on home monitoring.  Continue current regimen hydrochlorothiazide 25 mg p.o. daily.    4. History of asymptomatic PVCs  5.  History of AV susy reentrant tachycardia  No palpitations are reported.  Currently not on any beta-blocker or AV susy blocking agents.  Conservative management is advised        Return in 6 months for follow up.    Plan                There are no diagnoses linked to this encounter.  Follow Up     No follow-ups on file.      Melchor Ortez MD  Interventional Cardiology  12/04/2024  08:44 EST      Patient was given instructions and counseling regarding his condition or for health maintenance advice. Please see specific information pulled into the AVS if appropriate.     Please note that portions of this document were completed using a voice recognition program.     Patient or patient representative verbalized consent for the use of Ambient Listening during the visit with  Melchor Ortez MD for chart documentation. 12/5/2024  20:37 EST

## 2024-12-20 ENCOUNTER — HOSPITAL ENCOUNTER (OUTPATIENT)
Dept: MRI IMAGING | Facility: HOSPITAL | Age: 64
Discharge: HOME | End: 2024-12-20
Payer: MEDICARE

## 2024-12-20 DIAGNOSIS — S93.314S: ICD-10-CM

## 2024-12-20 PROCEDURE — 73721 MRI JNT OF LWR EXTRE W/O DYE: CPT

## 2024-12-23 ENCOUNTER — OFFICE VISIT (OUTPATIENT)
Dept: PODIATRY | Facility: CLINIC | Age: 64
End: 2024-12-23
Payer: MEDICARE

## 2024-12-23 VITALS
DIASTOLIC BLOOD PRESSURE: 85 MMHG | HEART RATE: 71 BPM | HEIGHT: 69 IN | SYSTOLIC BLOOD PRESSURE: 176 MMHG | OXYGEN SATURATION: 94 % | WEIGHT: 212 LBS | BODY MASS INDEX: 31.4 KG/M2

## 2024-12-23 DIAGNOSIS — M15.3 POST-TRAUMATIC OSTEOARTHRITIS OF MULTIPLE JOINTS: ICD-10-CM

## 2024-12-23 DIAGNOSIS — S93.314S: Primary | ICD-10-CM

## 2024-12-23 DIAGNOSIS — S93.315S: ICD-10-CM

## 2024-12-27 ENCOUNTER — TELEPHONE (OUTPATIENT)
Dept: GASTROENTEROLOGY | Facility: CLINIC | Age: 64
End: 2024-12-27
Payer: OTHER GOVERNMENT

## 2024-12-27 NOTE — TELEPHONE ENCOUNTER
Rec'd notification from [facility]. They will no longer be covering Dexilant. Preferred alternatives: Nexium, Prilosec, Protonix, or Aciphex. Per Palmira Cervantes, APRN: see if pt has tried aciphex in the past, if not she can send in, if he has then he will need to continue his current regimen.  I called and s/w pt he states he doesn't think he has Dexilant, he has been taking Nexium. Pt states he would not like to try aciphex at this time, will keep F/U to further discuss if he would like to change medications or will let us know before his appt.

## 2024-12-31 ENCOUNTER — HOSPITAL ENCOUNTER (OUTPATIENT)
Dept: NUCLEAR MEDICINE | Facility: HOSPITAL | Age: 64
Discharge: HOME | End: 2024-12-31
Payer: MEDICARE

## 2024-12-31 DIAGNOSIS — I25.10 CORONARY ARTERY DISEASE INVOLVING NATIVE CORONARY ARTERY OF NATIVE HEART WITHOUT ANGINA PECTORIS: ICD-10-CM

## 2024-12-31 LAB
BH CV IMMEDIATE POST TECH DATA BLOOD PRESSURE: NORMAL MMHG
BH CV IMMEDIATE POST TECH DATA HEART RATE: 125 BPM
BH CV IMMEDIATE POST TECH DATA OXYGEN SATS: 96 %
BH CV NINE MINUTE RECOVERY TECH DATA BLOOD PRESSURE: NORMAL MMHG
BH CV NINE MINUTE RECOVERY TECH DATA HEART RATE: 88 BPM
BH CV NINE MINUTE RECOVERY TECH DATA OXYGEN SATURATION: 96 %
BH CV REST NUCLEAR ISOTOPE DOSE: 9.8 MCI
BH CV SIX MINUTE RECOVERY TECH DATA BLOOD PRESSURE: NORMAL
BH CV SIX MINUTE RECOVERY TECH DATA HEART RATE: 92 BPM
BH CV SIX MINUTE RECOVERY TECH DATA OXYGEN SATURATION: 96 %
BH CV STRESS BP STAGE 1: NORMAL
BH CV STRESS BP STAGE 2: NORMAL
BH CV STRESS DURATION MIN STAGE 1: 3
BH CV STRESS DURATION MIN STAGE 2: 3
BH CV STRESS DURATION MIN STAGE 3: 1
BH CV STRESS DURATION SEC STAGE 1: 0
BH CV STRESS DURATION SEC STAGE 2: 0
BH CV STRESS DURATION SEC STAGE 3: 30
BH CV STRESS GRADE STAGE 1: 10
BH CV STRESS GRADE STAGE 2: 12
BH CV STRESS GRADE STAGE 3: 12
BH CV STRESS HR STAGE 1: 130
BH CV STRESS HR STAGE 2: 138
BH CV STRESS HR STAGE 3: 143
BH CV STRESS METS STAGE 1: 5
BH CV STRESS METS STAGE 2: 7.5
BH CV STRESS METS STAGE 3: 10
BH CV STRESS NUCLEAR ISOTOPE DOSE: 37.4 MCI
BH CV STRESS O2 STAGE 1: 95
BH CV STRESS O2 STAGE 2: 94
BH CV STRESS O2 STAGE 3: 94
BH CV STRESS PROTOCOL 1: NORMAL
BH CV STRESS RECOVERY BP: NORMAL MMHG
BH CV STRESS RECOVERY HR: 88 BPM
BH CV STRESS RECOVERY O2: 96 %
BH CV STRESS SPEED STAGE 1: 1.7
BH CV STRESS SPEED STAGE 2: 2.5
BH CV STRESS SPEED STAGE 3: 2.5
BH CV STRESS STAGE 1: 1
BH CV STRESS STAGE 2: 2
BH CV STRESS STAGE 3: 3
BH CV THREE MINUTE POST TECH DATA BLOOD PRESSURE: NORMAL MMHG
BH CV THREE MINUTE POST TECH DATA HEART RATE: 96 BPM
BH CV THREE MINUTE POST TECH DATA OXYGEN SATURATION: 96 %
MAXIMAL PREDICTED HEART RATE: 154 BPM
PERCENT MAX PREDICTED HR: 92.86 %
SPECT HRT GATED+EF W RNC IV: 68 %
STRESS BASELINE BP: NORMAL MMHG
STRESS BASELINE HR: 81 BPM
STRESS O2 SAT REST: 95 %
STRESS PERCENT HR: 109 %
STRESS POST ESTIMATED WORKLOAD: 7.1 METS
STRESS POST EXERCISE DUR MIN: 7 MIN
STRESS POST EXERCISE DUR SEC: 30 SEC
STRESS POST O2 SAT PEAK: 94 %
STRESS POST PEAK BP: NORMAL MMHG
STRESS POST PEAK HR: 143 BPM
STRESS TARGET HR: 131 BPM

## 2024-12-31 PROCEDURE — 93017 CV STRESS TEST TRACING ONLY: CPT

## 2024-12-31 PROCEDURE — A9502 TC99M TETROFOSMIN: HCPCS | Performed by: STUDENT IN AN ORGANIZED HEALTH CARE EDUCATION/TRAINING PROGRAM

## 2024-12-31 PROCEDURE — 78452 HT MUSCLE IMAGE SPECT MULT: CPT

## 2024-12-31 PROCEDURE — 34310000005 TECHNETIUM TETROFOSMIN KIT: Performed by: STUDENT IN AN ORGANIZED HEALTH CARE EDUCATION/TRAINING PROGRAM

## 2024-12-31 RX ADMIN — TETROFOSMIN 1 DOSE: 1.38 INJECTION, POWDER, LYOPHILIZED, FOR SOLUTION INTRAVENOUS at 07:00

## 2024-12-31 RX ADMIN — TETROFOSMIN 1 DOSE: 1.38 INJECTION, POWDER, LYOPHILIZED, FOR SOLUTION INTRAVENOUS at 08:23

## 2025-01-01 NOTE — PROGRESS NOTES
[facility] - PODIATRY    Today's Date: 01/01/25    Patient Name: Rico Armstrong  MRN: [id number]  CSN: [id number]  PCP: Minh Shay DO,   Referring Provider: No ref. provider found    SUBJECTIVE     Chief Complaint   Patient presents with    Right Ankle - Follow-up, Results, Pain     MRI follow up       HPI: Rico Armstrong, a 64 y.o.male, presents to clinic.    Patient 64-year-old male presenting with right foot pain.  Patient had a dislocation on 7/5/2024.  His foot was relocated and he was placed in a splint.  He has been treated conservatively for the last 3 to 4 months and is still having issues and pain to the top and bottom of his foot.  Ankle has improved.  He is here for further treatment.    12/30/24- Patient is here for MRI follow up. Still having trouble going down stairs.    Past Medical History:   Diagnosis Date    Allergies     Ankle pain, right     Esophageal reflux     Foot pain, right     HLD (hyperlipidemia)     HTN (hypertension)     Hypothyroid     CHERRY (obstructive sleep apnea) 02/14/2019    Throat cancer     Tonsil cancer     SEE DR ELY WILKS AT [facility]. DR MCKINLEY RIGGS AND DR RAHUL GUEVARA FOR CANCER REHAB SQUAMOUS CELL CARCINOMA     Past Surgical History:   Procedure Laterality Date    APPENDECTOMY      COLONOSCOPY  05/02/2018    ENDOSCOPY N/A 5/29/2024    Procedure: ESOPHAGOGASTRODUODENOSCOPY WITH BIOPSIES;  Surgeon: Ryan Woods MD;  Location: [facility] ENDOSCOPY;  Service: Gastroenterology;  Laterality: N/A;  GASTRIC POLYP/HIATAL HERNIA    GASTROSTOMY FEEDING TUBE INSERTION      KNEE SURGERY      NOSE SURGERY      CARTLIAGE GRAFT AUTOGENOUS, TO NOSE FROM RIB    PORTACATH PLACEMENT      SKIN CANCER EXCISION      LEFT EAR    UPPER GASTROINTESTINAL ENDOSCOPY       Family History   Problem Relation Age of Onset    Stroke Other     Alzheimer's disease Other     Heart disease Other     Diabetes Other     Colon cancer Neg Hx      Social  History     Socioeconomic History    Marital status:    Tobacco Use    Smoking status: Former     Current packs/day: 0.00     Average packs/day: 2.0 packs/day for 5.0 years (10.0 ttl pk-yrs)     Types: Cigarettes     Start date:      Quit date:      Years since quittin.0     Passive exposure: Past    Smokeless tobacco: Never    Tobacco comments:     AGE [age]   Vaping Use    Vaping status: Never Used   Substance and Sexual Activity    Alcohol use: Not Currently     Comment: RARE SOCIALLY    Drug use: Defer    Sexual activity: Defer     No Known Allergies  Current Outpatient Medications   Medication Sig Dispense Refill    Aspirin Low Dose 81 MG EC tablet       cetirizine (zyrTEC) 10 MG tablet       cholecalciferol (VITAMIN D3) 25 MCG (1000 UT) tablet       dexlansoprazole (Dexilant) 60 MG capsule Take 1 capsule by mouth Daily. 30 capsule 3    Dupilumab (Dupixent) 300 MG/2ML solution prefilled syringe Inject 2 mL under the skin into the appropriate area as directed 1 (One) Time Per Week. 4 each 5    Evolocumab (REPATHA) solution auto-injector SureClick injection Inject 1 mL under the skin into the appropriate area as directed Every 14 (Fourteen) Days. 5 mL 5    fluticasone (Flonase) 50 MCG/ACT nasal spray 2 sprays into the nostril(s) as directed by provider Daily. 16 g 3    hydroCHLOROthiazide (HYDRODIURIL) 25 MG tablet       levothyroxine (Synthroid) 88 MCG tablet Take 1 tablet by mouth Daily. 90 tablet 3    multivitamin (THERAGRAN) tablet tablet Take  by mouth Daily.      Omega-3 Fatty Acids (fish oil) 1000 MG capsule capsule Take  by mouth Daily.      Sod Fluoride-Potassium Nitrate (PreviDent 5000 Sensitive) 1.1-5 % paste Prevident 5000 Enamel Protect 1.1-5 % dental paste Brush before bed and do not rinse   Active       No current facility-administered medications for this visit.     Review of Systems   Musculoskeletal:  Positive for arthralgias.   All other systems reviewed and are  negative.      OBJECTIVE     Vitals:    12/23/24 0844   BP: 176/85   Pulse: 71   SpO2: 94%       WBC   Date Value Ref Range Status   11/08/2024 6.92 3.40 - 10.80 10*3/mm3 Final   07/30/2019 10.43 4.5 - 11.0 10*3/uL Final     RBC   Date Value Ref Range Status   11/08/2024 5.18 4.14 - 5.80 10*6/mm3 Final   07/30/2019 4.21 (L) 4.5 - 5.9 10*6/uL Final     Hemoglobin   Date Value Ref Range Status   11/08/2024 14.9 13.0 - 17.7 g/dL Final   07/30/2019 12.2 (L) 13.5 - 17.5 g/dL Final     Hematocrit   Date Value Ref Range Status   11/08/2024 44.5 37.5 - 51.0 % Final   07/30/2019 38.1 (L) 41.0 - 53.0 % Final     MCV   Date Value Ref Range Status   11/08/2024 85.9 79.0 - 97.0 fL Final   07/30/2019 90.5 80.0 - 100.0 fL Final     MCH   Date Value Ref Range Status   11/08/2024 28.8 26.6 - 33.0 pg Final   07/30/2019 29.0 26.0 - 34.0 pg Final     MCHC   Date Value Ref Range Status   11/08/2024 33.5 31.5 - 35.7 g/dL Final   07/30/2019 32.0 31.0 - 37.0 g/dL Final     RDW   Date Value Ref Range Status   11/08/2024 13.0 12.3 - 15.4 % Final   07/30/2019 13.9 12.0 - 16.8 % Final     RDW-SD   Date Value Ref Range Status   11/08/2024 40.5 37.0 - 54.0 fl Final     MPV   Date Value Ref Range Status   11/08/2024 9.9 6.0 - 12.0 fL Final   07/30/2019 10.3 6.7 - 10.8 fL Final     Platelets   Date Value Ref Range Status   11/08/2024 265 140 - 450 10*3/mm3 Final   07/30/2019 264 140 - 440 10*3/uL Final     Neutrophil Rel %   Date Value Ref Range Status   07/30/2019 64.7 45 - 80 % Final     Neutrophil %   Date Value Ref Range Status   11/08/2024 38.2 (L) 42.7 - 76.0 % Final     Lymphocyte Rel %   Date Value Ref Range Status   07/30/2019 24.7 15 - 50 % Final     Lymphocyte %   Date Value Ref Range Status   11/08/2024 47.7 (H) 19.6 - 45.3 % Final     Monocyte Rel %   Date Value Ref Range Status   07/30/2019 9.9 0 - 15 % Final     Monocyte %   Date Value Ref Range Status   11/08/2024 7.8 5.0 - 12.0 % Final     Eosinophil %   Date Value Ref Range  Status   11/08/2024 5.3 0.3 - 6.2 % Final   07/30/2019 0.4 0 - 7 % Final     Basophil Rel %   Date Value Ref Range Status   07/30/2019 0.1 0 - 2 % Final     Basophil %   Date Value Ref Range Status   11/08/2024 0.9 0.0 - 1.5 % Final     Immature Grans %   Date Value Ref Range Status   11/08/2024 0.1 0.0 - 0.5 % Final   07/30/2019 0.2 (H) 0 % Final     Neutrophils Absolute   Date Value Ref Range Status   07/30/2019 6.75 2.0 - 8.8 10*3/uL Final     Neutrophils, Absolute   Date Value Ref Range Status   11/08/2024 2.64 1.70 - 7.00 10*3/mm3 Final     Lymphocytes Absolute   Date Value Ref Range Status   07/30/2019 2.58 0.7 - 5.5 10*3/uL Final     Lymphocytes, Absolute   Date Value Ref Range Status   11/08/2024 3.30 (H) 0.70 - 3.10 10*3/mm3 Final     Monocytes Absolute   Date Value Ref Range Status   07/30/2019 1.03 0.0 - 1.7 10*3/uL Final     Monocytes, Absolute   Date Value Ref Range Status   11/08/2024 0.54 0.10 - 0.90 10*3/mm3 Final     Eosinophils Absolute   Date Value Ref Range Status   07/30/2019 0.04 0.0 - 0.8 10*3/uL Final     Eosinophils, Absolute   Date Value Ref Range Status   11/08/2024 0.37 0.00 - 0.40 10*3/mm3 Final     Basophils Absolute   Date Value Ref Range Status   07/30/2019 0.01 0.0 - 0.2 10*3/uL Final     Basophils, Absolute   Date Value Ref Range Status   11/08/2024 0.06 0.00 - 0.20 10*3/mm3 Final     Immature Grans, Absolute   Date Value Ref Range Status   11/08/2024 0.01 0.00 - 0.05 10*3/mm3 Final   07/30/2019 0.02 <1 10*3/uL Final     nRBC   Date Value Ref Range Status   11/08/2024 0.0 0.0 - 0.2 /100 WBC Final         Lab Results   Component Value Date    GLUCOSE 95 11/08/2024    BUN 16 11/08/2024    CREATININE 1.06 11/08/2024    BCR 15.1 11/08/2024    K 3.8 11/08/2024    CO2 29.0 11/08/2024    CALCIUM 10.0 11/08/2024    ALBUMIN 4.1 11/08/2024    LABIL2 1.5 07/29/2019    AST 18 11/08/2024    ALT 10 11/08/2024       Patient seen in no apparent distress.      PHYSICAL EXAM:     Foot/Ankle  Exam    GENERAL  Appearance:  appears stated age  Orientation:  AAOx3  Affect:  appropriate  Gait:  unimpaired  Assistance:  independent  Right shoe gear: casual shoe  Left shoe gear: casual shoe    VASCULAR     Right Foot Vascularity   Normal vascular exam    Dorsalis pedis:  2+  Posterior tibial:  2+  Skin temperature:  warm  Edema grading:  None  CFT:  < 3 seconds  Pedal hair growth:  Present  Varicosities:  none     Left Foot Vascularity   Normal vascular exam    Dorsalis pedis:  2+  Posterior tibial:  2+  Skin temperature:  warm  Edema grading:  None  CFT:  < 3 seconds  Pedal hair growth:  Present  Varicosities:  none     NEUROLOGIC     Right Foot Neurologic   Normal sensation    Light touch sensation: normal  Vibratory sensation: normal  Hot/Cold sensation: normal  Protective Sensation using Jenera-Rukhsana Monofilament:   Sites intact: 10  Sites tested: 10     Left Foot Neurologic   Normal sensation    Light touch sensation: normal  Vibratory sensation: normal  Hot/Cold sensation:  normal  Protective Sensation using Jenera-Rukhsana Monofilament:   Sites intact: 10  Sites tested: 10    MUSCLE STRENGTH     Right Foot Muscle Strength   Foot dorsiflexion:  4  Foot plantar flexion:  4  Foot inversion:  4  Foot eversion:  4     Left Foot Muscle Strength   Foot dorsiflexion:  4  Foot plantar flexion:  4  Foot inversion:  4  Foot eversion:  4    RANGE OF MOTION     Right Foot Range of Motion   Foot and ankle ROM within normal limits       Left Foot Range of Motion   Foot and ankle ROM within normal limits      DERMATOLOGIC      Right Foot Dermatologic   Skin  Right foot skin is intact.      Left Foot Dermatologic   Skin  Left foot skin is intact.       RADIOLOGY:          Stress Test With Myocardial Perfusion One Day    Result Date: 12/31/2024  Narrative:   Low risk for ischemic heart disease. Low risk Mansoor protocol exercise treadmill testing with myocardial perfusion imaging Patient exercised for 7 minutes and 30  seconds equivalent to 7 METS, achieved target heart rate without angina.  Stress test was terminated due to hypertensive response to stress. Hypertensive hemodynamic response is noted, hypertension present at baseline.  Adequate heart rate recovery is noted There were no reversible perfusion defect noted.  No evidence of ischemia is noted. No transient ischemic dilatation is noted. No wall motion maladies are noted.  Stress LVEF is greater than 60%. At peak stress, no EKG changes were noted concerning for ischemia.  PVCs were noted during stress and recovery phase. Estimated Muller treadmill score is +7 which puts patient at low risk of cardiovascular events for 1 year.     MRI Ankle Right Without Contrast    Result Date: 12/24/2024  Narrative: MRI ANKLE RIGHT WO CONTRAST Date of Exam: 12/20/2024 4:37 PM EST Indication: STJ dislocation x 4 months, T-N, STJ pain.  Comparison: Radiographs August 13, 2024, July 5, 2024 Technique:  Routine multiplanar/multisequence images of the right ankle were obtained without contrast administration. Findings: BONES There appears to be an incompletely united nondisplaced fracture at the posterior-medial talus. There is mild marrow edema signal at the anterior process of the calcaneus. There is a suspected tiny avulsion fracture at the anterior process of the calcaneus, best seen on sagittal images. There is also suspected sequelae of prior avulsion injury at the dorsal talus and navicular at the talonavicular joint. Mild marrow edema signal at the anterior-anterior talus and medial calcaneus without well-defined fracture on this exam. Osseous alignment appears within normal limits. TENDONS Achilles tendon appears intact. Trace fluid and mild surrounding edema along the distal peroneal tendons. No definite peroneal tendon tear or displacement. No definite extensor or flexor tendon tear. There is suspected tendinopathy of the distal posterior tibialis tendon. No significant  tenosynovitis. No findings of acute plantar fasciitis or tear. MUSCLES No significant muscle edema or atrophy. LIGAMENTS Deltoid ligament appears grossly intact. The superior-medial spring ligament appears thickened with intermediate signal suggesting degeneration/tear. Distal syndesmosis and lateral ankle ligaments appear intact. There appears to be thickening and intermediate signal along the dorsal talonavicular joint and lateral calcaneocuboid joint suggesting prior capsular/ligament avulsion injuries. Lisfranc ligament appears intact. There is intermediate signal in the sinus tarsi. Sinus tarsi structures appear indistinct. CARTILAGE No definite osteochondral lesion at the talar dome. There is suspected partial thickness cartilage loss of the tibiotalar, subtalar, and midfoot joints with mild joint space narrowing. Findings suggest mild osteoarthritis. SOFT TISSUES There is mild subcutaneous edema, primarily along the lateral aspect of the ankle. No definite focal collection or well-defined mass. OTHER INTRAARTICULAR FINDINGS No significant joint effusion. OTHER EXTRAARTICULAR FINDINGS No suspicious abnormality is identified in the tarsal tunnel.     Impression: Impression: 1.Incompletely united nondisplaced fracture at the posterior-medial talus. No other significant acute displaced fracture is seen on this exam. The could be performed for additional evaluation of the osseous structures as indicated. 2.Suspected tiny avulsion fracture at the anterior process of the calcaneus with mild adjacent marrow edema. Sinus tarsi structures appear indistinct from prior injury. 3.Findings suggestive of prior capsular/ligament avulsion injuries at the dorsal talonavicular and lateral calcaneocuboid joints. 4.Distal posterior tibialis tendinopathy and degeneration/tear of the superior-medial spring ligament. 5.Mild osteoarthritis of the tibiotalar, subtalar, and midfoot joints. 6.Mild peroneal tenosynovitis. Electronically  Signed: Ervin Borges  12/24/2024 10:51 AM EST  Workstation ID: [id number]    Duplex Carotid Ultrasound CAR    Result Date: 12/19/2024  Narrative: Table formatting from the original result was not included. Carotid Duplex - CPT 14828 Technique: Extracranial cerebrovascular duplex evaluation was performed utilizing B-mode, color flow and pulsed wave Doppler. Indication: Known Stenosis Duplex Evaluation: Right   Left   PSV (cm/s) EDV (cm/s)  PSV (cm/s) EDV (cm/s) 77 17 CCA Proximal 108 38 65 19 CCA Distal 101 36 49  ECA occluded   270 67 ICA Proximal 155 52 118 34 ICA Distal 151 56 87 19 Vertebral 111 34 Direction Antegrade  Direction Antegrade 206  Subclavian 143   Waveform Triphasic  Waveform Triphasic 3.5 ICA/CCA Ratio 1.4 Plaque Characterization: Right  Left Large Common Carotid Artery Moderate  External Carotid Artery occluded Large Bulb Moderate Large Internal Carotid Artery Moderate Technologist: ZAHIRA DavidT Technologist Preliminary Findings: Bilateral internal carotid arteries with 50-79% stenosis. Right external carotid artery with retrograde flow noted. Left external carotid artery with known occluded. Bilateral vertebral arteries demonstrate antegrade flow. As compared to previous examination on 12/20/23, there has been an increase in plaque morphology. Impression: Bilateral internal carotid arteries with 50-79% stenosis. Right external carotid artery with retrograde flow noted. Left external carotid artery with known occluded. Bilateral vertebral arteries demonstrate antegrade flow. As compared to previous examination on 12/20/23, there has been an increase in plaque morphology.      ASSESSMENT/PLAN     Diagnoses and all orders for this visit:    1. Closed dislocation of tarsal joint, right, sequela (Primary)  -     Ambulatory Referral to Physical Therapy for Evaluation & Treatment    2. Closed dislocation of tarsal joint of left foot, sequela  -     Ambulatory Referral to Physical Therapy for Evaluation  & Treatment    3. Post-traumatic osteoarthritis of multiple joints  -     Ambulatory Referral to Physical Therapy for Evaluation & Treatment        Comprehensive lower extremity examination and evaluation was performed.    Recommend PT for strengthening and ROM   RTC in 2-3 months.     Discussed findings and treatment plan including risks, benefits, and treatment options with patient in detail. Patient agreed with treatment plan.    Medications and allergies reviewed.  Reviewed available lab values along with other pertinent labs.  These were discussed with the patient.    An After Visit Summary was printed and given to the patient at discharge, including (if requested) any available informative/educational handouts regarding diagnosis, treatment, or medications. All questions were answered to patient/family satisfaction. Should symptoms fail to improve or worsen they agree to call or return to clinic or to go to the Emergency Department. Discussed the importance of following up with any needed screening tests/labs/specialist appointments and any requested follow-up recommended by me today. Importance of maintaining follow-up discussed and patient accepts that missed appointments can delay diagnosis and potentially lead to worsening of conditions.    No follow-ups on file., or sooner if acute issues arise.    This document has been electronically signed by Gabo Mahajan DPM on January 1, 2025 10:10 EST

## 2025-01-13 ENCOUNTER — TELEPHONE (OUTPATIENT)
Dept: PODIATRY | Facility: CLINIC | Age: 65
End: 2025-01-13

## 2025-01-13 NOTE — TELEPHONE ENCOUNTER
Please see pt message.      Normally at check out you ask if the pt is going to go outside of [facility] and will give them the order and the pt will need to call the PT place to schedule themselves.     I am not sure if the pt received the order or if someone up front said that they would fax it for him.    Please call pt.    Thanks

## 2025-01-13 NOTE — TELEPHONE ENCOUNTER
Provider: DAGMAR    Caller: Rico Armstrong    Relationship to Patient: Self    Phone Number: [phone]    Reason for Call: PT STATES HE WAS LAST SEEN 12/23/24 FOR RT ANKLE AND WAS SUPPOSED TO HAVE ORDER SENT TO [facility] WAY ON [address]. PT HAS NOT HEARD FROM THEM AND REACHED OUT AND THEY STATED THEY DID NOT HAVE A REFERRAL. PLEASE ADVISE.

## 2025-01-15 DIAGNOSIS — S93.315S: Primary | ICD-10-CM

## 2025-01-15 DIAGNOSIS — S93.315S: ICD-10-CM

## 2025-01-30 ENCOUNTER — TELEPHONE (OUTPATIENT)
Dept: FAMILY MEDICINE CLINIC | Facility: CLINIC | Age: 65
End: 2025-01-30

## 2025-02-03 ENCOUNTER — CLINICAL SUPPORT (OUTPATIENT)
Dept: FAMILY MEDICINE CLINIC | Facility: CLINIC | Age: 65
End: 2025-02-03
Payer: MEDICARE

## 2025-02-03 DIAGNOSIS — E78.5 HYPERLIPIDEMIA LDL GOAL <70: ICD-10-CM

## 2025-02-03 DIAGNOSIS — E55.9 VITAMIN D DEFICIENCY: ICD-10-CM

## 2025-02-03 DIAGNOSIS — E03.9 HYPOTHYROIDISM, UNSPECIFIED TYPE: ICD-10-CM

## 2025-02-03 DIAGNOSIS — Z51.81 MEDICATION MONITORING ENCOUNTER: Primary | ICD-10-CM

## 2025-02-03 DIAGNOSIS — E53.8 B12 DEFICIENCY: ICD-10-CM

## 2025-02-03 DIAGNOSIS — R73.03 PREDIABETES: ICD-10-CM

## 2025-02-03 DIAGNOSIS — I10 ESSENTIAL HYPERTENSION: ICD-10-CM

## 2025-02-03 LAB
25(OH)D3 SERPL-MCNC: 66.8 NG/ML (ref 30–100)
ALBUMIN SERPL-MCNC: 4.3 G/DL (ref 3.5–5.2)
ALBUMIN/GLOB SERPL: 1.5 G/DL
ALP SERPL-CCNC: 62 U/L (ref 39–117)
ALT SERPL W P-5'-P-CCNC: 19 U/L (ref 1–41)
ANION GAP SERPL CALCULATED.3IONS-SCNC: 12 MMOL/L (ref 5–15)
AST SERPL-CCNC: 30 U/L (ref 1–40)
BASOPHILS # BLD AUTO: 0.04 10*3/MM3 (ref 0–0.2)
BASOPHILS NFR BLD AUTO: 0.7 % (ref 0–1.5)
BILIRUB SERPL-MCNC: 0.5 MG/DL (ref 0–1.2)
BUN SERPL-MCNC: 15 MG/DL (ref 8–23)
BUN/CREAT SERPL: 16.1 (ref 7–25)
CALCIUM SPEC-SCNC: 9.6 MG/DL (ref 8.6–10.5)
CHLORIDE SERPL-SCNC: 98 MMOL/L (ref 98–107)
CHOLEST SERPL-MCNC: 127 MG/DL (ref 0–200)
CO2 SERPL-SCNC: 28 MMOL/L (ref 22–29)
CREAT SERPL-MCNC: 0.93 MG/DL (ref 0.76–1.27)
DEPRECATED RDW RBC AUTO: 41.2 FL (ref 37–54)
EGFRCR SERPLBLD CKD-EPI 2021: 90.6 ML/MIN/1.73
EOSINOPHIL # BLD AUTO: 0.26 10*3/MM3 (ref 0–0.4)
EOSINOPHIL NFR BLD AUTO: 4.3 % (ref 0.3–6.2)
ERYTHROCYTE [DISTWIDTH] IN BLOOD BY AUTOMATED COUNT: 13.2 % (ref 12.3–15.4)
GLOBULIN UR ELPH-MCNC: 2.9 GM/DL
GLUCOSE SERPL-MCNC: 99 MG/DL (ref 65–99)
HBA1C MFR BLD: 5.5 % (ref 4.8–5.6)
HCT VFR BLD AUTO: 43 % (ref 37.5–51)
HDLC SERPL-MCNC: 50 MG/DL (ref 40–60)
HGB BLD-MCNC: 14.7 G/DL (ref 13–17.7)
IMM GRANULOCYTES # BLD AUTO: 0.02 10*3/MM3 (ref 0–0.05)
IMM GRANULOCYTES NFR BLD AUTO: 0.3 % (ref 0–0.5)
LDLC SERPL CALC-MCNC: 57 MG/DL (ref 0–100)
LDLC/HDLC SERPL: 1.1 {RATIO}
LYMPHOCYTES # BLD AUTO: 2.72 10*3/MM3 (ref 0.7–3.1)
LYMPHOCYTES NFR BLD AUTO: 44.7 % (ref 19.6–45.3)
MCH RBC QN AUTO: 29.6 PG (ref 26.6–33)
MCHC RBC AUTO-ENTMCNC: 34.2 G/DL (ref 31.5–35.7)
MCV RBC AUTO: 86.5 FL (ref 79–97)
MONOCYTES # BLD AUTO: 0.58 10*3/MM3 (ref 0.1–0.9)
MONOCYTES NFR BLD AUTO: 9.5 % (ref 5–12)
NEUTROPHILS NFR BLD AUTO: 2.46 10*3/MM3 (ref 1.7–7)
NEUTROPHILS NFR BLD AUTO: 40.5 % (ref 42.7–76)
NRBC BLD AUTO-RTO: 0 /100 WBC (ref 0–0.2)
PLATELET # BLD AUTO: 275 10*3/MM3 (ref 140–450)
PMV BLD AUTO: 9.4 FL (ref 6–12)
POTASSIUM SERPL-SCNC: 3.6 MMOL/L (ref 3.5–5.2)
PROT SERPL-MCNC: 7.2 G/DL (ref 6–8.5)
RBC # BLD AUTO: 4.97 10*6/MM3 (ref 4.14–5.8)
SODIUM SERPL-SCNC: 138 MMOL/L (ref 136–145)
T4 FREE SERPL-MCNC: 1.34 NG/DL (ref 0.92–1.68)
TRIGL SERPL-MCNC: 111 MG/DL (ref 0–150)
TSH SERPL DL<=0.05 MIU/L-ACNC: 2.54 UIU/ML (ref 0.27–4.2)
VIT B12 BLD-MCNC: 1273 PG/ML (ref 211–946)
VLDLC SERPL-MCNC: 20 MG/DL (ref 5–40)
WBC NRBC COR # BLD AUTO: 6.08 10*3/MM3 (ref 3.4–10.8)

## 2025-02-03 PROCEDURE — 84439 ASSAY OF FREE THYROXINE: CPT | Performed by: FAMILY MEDICINE

## 2025-02-03 PROCEDURE — 85025 COMPLETE CBC W/AUTO DIFF WBC: CPT | Performed by: FAMILY MEDICINE

## 2025-02-03 PROCEDURE — 84443 ASSAY THYROID STIM HORMONE: CPT | Performed by: FAMILY MEDICINE

## 2025-02-03 PROCEDURE — 80053 COMPREHEN METABOLIC PANEL: CPT | Performed by: FAMILY MEDICINE

## 2025-02-03 PROCEDURE — 82306 VITAMIN D 25 HYDROXY: CPT | Performed by: FAMILY MEDICINE

## 2025-02-03 PROCEDURE — 36415 COLL VENOUS BLD VENIPUNCTURE: CPT | Performed by: FAMILY MEDICINE

## 2025-02-03 PROCEDURE — 83036 HEMOGLOBIN GLYCOSYLATED A1C: CPT | Performed by: FAMILY MEDICINE

## 2025-02-03 PROCEDURE — 80061 LIPID PANEL: CPT | Performed by: FAMILY MEDICINE

## 2025-02-03 PROCEDURE — 82607 VITAMIN B-12: CPT | Performed by: FAMILY MEDICINE

## 2025-02-05 ENCOUNTER — OFFICE VISIT (OUTPATIENT)
Dept: FAMILY MEDICINE CLINIC | Facility: CLINIC | Age: 65
End: 2025-02-05
Payer: MEDICARE

## 2025-02-05 VITALS
WEIGHT: 212 LBS | HEIGHT: 69 IN | HEART RATE: 82 BPM | OXYGEN SATURATION: 92 % | SYSTOLIC BLOOD PRESSURE: 132 MMHG | TEMPERATURE: 97.9 F | BODY MASS INDEX: 31.4 KG/M2 | DIASTOLIC BLOOD PRESSURE: 80 MMHG

## 2025-02-05 DIAGNOSIS — E53.8 B12 DEFICIENCY: ICD-10-CM

## 2025-02-05 DIAGNOSIS — G89.29 CHRONIC PAIN OF RIGHT ANKLE: ICD-10-CM

## 2025-02-05 DIAGNOSIS — Z51.81 MEDICATION MONITORING ENCOUNTER: ICD-10-CM

## 2025-02-05 DIAGNOSIS — E55.9 VITAMIN D DEFICIENCY: ICD-10-CM

## 2025-02-05 DIAGNOSIS — M25.571 CHRONIC PAIN OF RIGHT ANKLE: ICD-10-CM

## 2025-02-05 DIAGNOSIS — E78.2 MIXED HYPERLIPIDEMIA: ICD-10-CM

## 2025-02-05 DIAGNOSIS — I65.23 BILATERAL CAROTID ARTERY STENOSIS: ICD-10-CM

## 2025-02-05 DIAGNOSIS — E03.9 HYPOTHYROIDISM, UNSPECIFIED TYPE: ICD-10-CM

## 2025-02-05 DIAGNOSIS — Z98.890 HISTORY OF CAROTID ENDARTERECTOMY: ICD-10-CM

## 2025-02-05 DIAGNOSIS — Z12.5 SCREENING FOR PROSTATE CANCER: ICD-10-CM

## 2025-02-05 DIAGNOSIS — R73.03 PREDIABETES: ICD-10-CM

## 2025-02-05 DIAGNOSIS — I10 ESSENTIAL HYPERTENSION: Primary | ICD-10-CM

## 2025-02-05 DIAGNOSIS — K20.0 ESOPHAGITIS, EOSINOPHILIC: ICD-10-CM

## 2025-02-05 PROCEDURE — 1125F AMNT PAIN NOTED PAIN PRSNT: CPT | Performed by: FAMILY MEDICINE

## 2025-02-05 PROCEDURE — 99214 OFFICE O/P EST MOD 30 MIN: CPT | Performed by: FAMILY MEDICINE

## 2025-02-05 PROCEDURE — 3075F SYST BP GE 130 - 139MM HG: CPT | Performed by: FAMILY MEDICINE

## 2025-02-05 PROCEDURE — 3079F DIAST BP 80-89 MM HG: CPT | Performed by: FAMILY MEDICINE

## 2025-02-05 RX ORDER — LOSARTAN POTASSIUM 25 MG/1
25 TABLET ORAL DAILY
COMMUNITY

## 2025-02-05 NOTE — PROGRESS NOTES
Chief Complaint  Hypertension    Subjective          Rico Armstrong presents to [facility]    History of Present Illness     History of Present Illness  The patient is a 64-year-old male who presents today for follow-up.    He has been monitoring his blood pressure at home, as advised by his physician, with readings taken twice daily for two weeks. Although he missed a few sessions, he has maintained a record of these readings. His initial home readings were significantly elevated, around 174 systolic. However, he discovered that his blood pressure normalized after a few minutes of rest and deep breathing exercises. He is currently on losartan 25 mg and hydrochlorothiazide 25 mg for blood pressure management.    He has been under the care of a podiatrist for his right ankle, with surgery being considered as a last resort. He has been managing his condition with arch supports for plantar fasciitis, but recently, he has been experiencing severe heel pain when walking on hardwood floors without shoes. He has also been attending physical therapy sessions, with three completed to date.    He has been diagnosed with eosinophilic esophagitis and is currently on a regimen of Dupixent injections, administered once weekly. He has not experienced any episodes since starting this treatment. He also takes Nexium regularly.    He has been prescribed Repatha, to be taken every two weeks, by Dr. Ortez. He has discontinued Zetia and Lipitor. He is also on levothyroxine 88 mcg for thyroid management. He has made dietary modifications, including reducing his sugar intake and switching to Dr. Pepper Zero. He has previously taken B12 supplements but has since discontinued them.    MEDICATIONS  Current: Losartan, hydrochlorothiazide, Repatha, levothyroxine, Nexium, multivitamin.  Discontinued: Zetia, Lipitor, Dexilant.         Current Outpatient Medications   Medication Instructions    Aspirin Low  "Dose 81 MG EC tablet No dose, route, or frequency recorded.    cetirizine (zyrTEC) 10 MG tablet No dose, route, or frequency recorded.    cholecalciferol (VITAMIN D3) 25 MCG (1000 UT) tablet No dose, route, or frequency recorded.    dexlansoprazole (DEXILANT) 60 mg, Oral, Daily    Dupixent 300 mg, Subcutaneous, Weekly    Evolocumab (REPATHA) 140 mg, Subcutaneous, Every 14 Days    fluticasone (Flonase) 50 MCG/ACT nasal spray 2 sprays, Nasal, Daily    hydroCHLOROthiazide (HYDRODIURIL) 25 MG tablet No dose, route, or frequency recorded.    levothyroxine (SYNTHROID) 88 mcg, Oral, Daily    losartan (COZAAR) 25 mg, Oral, Daily    multivitamin (THERAGRAN) tablet tablet Daily    Omega-3 Fatty Acids (fish oil) 1000 MG capsule capsule Daily    Sod Fluoride-Potassium Nitrate (PreviDent 5000 Sensitive) 1.1-5 % paste Prevident 5000 Enamel Protect 1.1-5 % dental paste Brush before bed and do not rinse   Active       The following portions of the patient's history were reviewed and updated as appropriate: allergies, current medications, past family history, past medical history, past social history, past surgical history, and problem list.    Objective   Vital Signs:   /80   Pulse 82   Temp 97.9 °F (36.6 °C) (Oral)   Ht 175.3 cm (69\")   Wt 96.2 kg (212 lb)   SpO2 92%   BMI 31.31 kg/m²     BP Readings from Last 3 Encounters:   02/05/25 132/80   12/23/24 176/85   12/04/24 151/85     Wt Readings from Last 3 Encounters:   02/05/25 96.2 kg (212 lb)   12/23/24 96.2 kg (212 lb)   12/04/24 96.2 kg (212 lb)           Physical Exam  Vitals reviewed.   Constitutional:       Appearance: Normal appearance.   HENT:      Head: Normocephalic and atraumatic.      Right Ear: External ear normal.      Left Ear: External ear normal.   Eyes:      Conjunctiva/sclera: Conjunctivae normal.   Cardiovascular:      Rate and Rhythm: Normal rate and regular rhythm.      Heart sounds: No murmur heard.     No friction rub. No gallop.   Pulmonary:    "   Effort: Pulmonary effort is normal.      Breath sounds: Normal breath sounds. No wheezing or rhonchi.   Abdominal:      General: Bowel sounds are normal. There is no distension.      Palpations: Abdomen is soft.      Tenderness: There is no abdominal tenderness.   Skin:     General: Skin is warm and dry.   Neurological:      Mental Status: He is alert and oriented to person, place, and time.      Cranial Nerves: No cranial nerve deficit.   Psychiatric:         Mood and Affect: Mood and affect normal.         Behavior: Behavior normal.         Thought Content: Thought content normal.         Judgment: Judgment normal.            Result Review :   The following data was reviewed by: Minh Shay DO on 02/05/2025:  Common labs          8/1/2024    08:57 11/8/2024    09:10 2/3/2025    08:56   Common Labs   Glucose 103  95  99    BUN 18  16  15    Creatinine 0.98  1.06  0.93    Sodium 138  138  138    Potassium 4.1  3.8  3.6    Chloride 99  99  98    Calcium 9.7  10.0  9.6    Albumin 4.5  4.1  4.3    Total Bilirubin 0.4  0.5  0.5    Alkaline Phosphatase 62  72  62    AST (SGOT) 24  18  30    ALT (SGPT) 16  10  19    WBC 6.71  6.92  6.08    Hemoglobin 14.8  14.9  14.7    Hematocrit 45.6  44.5  43.0    Platelets 291  265  275    Total Cholesterol 211  187  127    Triglycerides 167  154  111    HDL Cholesterol 50  47  50    LDL Cholesterol  131  113  57    Hemoglobin A1C  5.70  5.50    PSA  0.333              Lab Results   Component Value Date    SARSANTIGEN Not Detected 10/31/2024    COVID19 NOT DETECTED 06/15/2020    FLUAAG Not Detected 10/31/2024    FLUBAG Not Detected 10/31/2024    RAPSCRN Negative 07/02/2024    INR 0.98 (L) 06/15/2020    BILIRUBINUR Negative 07/29/2019       Results  Laboratory Studies  CBC showed no anemia, normal white blood cell count and platelets. Lipid panel showed LDL of 57, triglycerides down to 111. TSH and free T4 levels looked good. B12 was 1273. Vitamin D levels were 66.8. A1c was  5.5%. Kidney function, liver enzymes, electrolytes all normal. PSA was normal in November 2024.    Imaging  Ultrasound with contrast showed asymptomatic carotid stenosis, status post endarterectomy on the right common internal carotid artery and postoperative changes, little calcification but nothing significant. Stress test showed no areas of abnormal perfusion defects.    Procedures        Assessment and Plan    Diagnoses and all orders for this visit:    1. Essential hypertension (Primary)  -     CBC & Differential; Future  -     Comprehensive Metabolic Panel; Future    2. Medication monitoring encounter    3. History of carotid endarterectomy    4. Bilateral carotid artery stenosis    5. Chronic pain of right ankle    6. Hypothyroidism, unspecified type  -     CBC & Differential; Future  -     Comprehensive Metabolic Panel; Future  -     TSH+Free T4; Future    7. Mixed hyperlipidemia  -     Lipid Panel; Future    8. Vitamin D deficiency  -     Vitamin D,25-Hydroxy; Future    9. Prediabetes  -     Hemoglobin A1c; Future    10. Screening for prostate cancer    11. B12 deficiency  -     Vitamin B12; Future    12. Esophagitis, eosinophilic        Assessment & Plan  1. Hypertension.  His blood pressure remains slightly elevated despite current medication. He is advised to continue his current regimen of losartan 25 mg and hydrochlorothiazide 25 mg. He should rest for 5 minutes with feet flat on the floor, back supported, and legs uncrossed before taking blood pressure readings. He will continue to monitor his blood pressure at home and maintain a log of the readings.    2. Eosinophilic esophagitis.  He is currently on Dupixent injections once weekly and reports no episodes since starting the treatment. He will continue with the Dupixent injections and follow up with his gastroenterologist on 05/07/2025. The potential risks of untreated eosinophilic esophagitis, including severe acid reflux and possible cancer  development, were discussed.    3. Hyperlipidemia.  His cholesterol levels have shown significant improvement with the use of Repatha. LDL is 57, and triglycerides are down to 111. He will continue with the Repatha injections every 2 weeks. He will see Dr. Ortez again in June 2025.    4. Hypothyroidism.  His thyroid levels, including TSH and free T4, are within normal limits. He will continue taking levothyroxine 88 mcg daily.    5. Vitamin B12 elevation.  His B12 levels, although previously high, have decreased to 1273. He will continue taking a multivitamin, which includes B12.    6. Vitamin D sufficiency.  His vitamin D levels are sufficient at 66.8. He will continue taking vitamin D supplements for bone health and strength.    7. Prediabetes.  His A1c has decreased to 5.5%, indicating he is no longer in the prediabetic range. He will continue with his current lifestyle modifications, including reducing sugar intake and drinking Dr. Pepper Zero.    8. Asymptomatic carotid stenosis.  He has a history of endarterectomy on the right common internal carotid artery with stable postoperative changes and minor calcification. No immediate intervention is required, and he will follow up in a year.    9. Right ankle fracture.  He has an incompletely united nondisplaced fracture of the posterior medial talus. No surgery is planned at this time, and he has been released from follow-up.    10. Health maintenance.  PSA was normal in November 2024. Labs will be ordered to be done in 6 months.    Follow-up  The patient will follow up in 6 months.    PROCEDURE  The patient underwent an endarterectomy on the right common internal carotid artery.       There are no discontinued medications.       Follow Up   Return in about 6 months (around 8/5/2025) for Hypertension.  Patient was given instructions and counseling regarding his condition or for health maintenance advice. Please see specific information pulled into the AVS if  appropriate.     Patient or patient representative verbalized consent for the use of Ambient Listening during the visit with  Minh Shay DO for chart documentation. 2/5/2025  08:13 EST    Minh Shay DO  02/05/25  08:32 EST

## 2025-02-13 ENCOUNTER — TELEPHONE (OUTPATIENT)
Dept: GASTROENTEROLOGY | Facility: CLINIC | Age: 65
End: 2025-02-13
Payer: OTHER GOVERNMENT

## 2025-02-13 RX ORDER — DUPILUMAB 300 MG/2ML
300 INJECTION, SOLUTION SUBCUTANEOUS WEEKLY
Qty: 4 EACH | Refills: 5 | Status: SHIPPED | OUTPATIENT
Start: 2025-02-13

## 2025-02-13 RX ORDER — DUPILUMAB 300 MG/2ML
300 INJECTION, SOLUTION SUBCUTANEOUS WEEKLY
Qty: 4 EACH | Refills: 5 | Status: SHIPPED | OUTPATIENT
Start: 2025-02-13 | End: 2025-02-13

## 2025-02-13 NOTE — TELEPHONE ENCOUNTER
Called and left a VM with [facility] to cancel the prescription and called to notify pt it was sent to [facility].

## 2025-02-13 NOTE — TELEPHONE ENCOUNTER
Done , can you call [facility] and cancel? Rx went there first as it was the preferred pharmacy initially.

## 2025-02-13 NOTE — TELEPHONE ENCOUNTER
Provider: BARRERA MENSAH    Caller: Rico Armstrong    Relationship to Patient: Self    Pharmacy: [facility]     Phone Number: [phone]    Reason for Call: PT CALLED TO FIND OUT IF HE IS TO CONTINUE ON THE DUPIXENT. IF SO, PT IS IN NEED OF A NEW PRESCRIPTION HE ONLY HAS 2 DOSES LEFT.

## 2025-02-21 ENCOUNTER — TELEPHONE (OUTPATIENT)
Dept: PODIATRY | Facility: CLINIC | Age: 65
End: 2025-02-21

## 2025-02-21 NOTE — TELEPHONE ENCOUNTER
Caller: KERRI LONG     Phone Number: [phone] (home)     Reason for Call:   PATIENT IS CALLING NEEDING IMAGING THAT HAS BEEN DONE AND REPORT. PATIENT CAN  DISK AND REPORT CAN BE FAXED [phone] [facility].

## 2025-05-06 NOTE — PROGRESS NOTES
Patient Name: Rico Armstrong   Visit Date: 05/07/2025   Patient ID: [id number]  Provider: BARRERA Mohamud    Sex: male  Location:  Location Address:  Location Phone: [phone] [address] [phone]    YOB: 1960  Age: 64 y.o.   Primary Care Provider Minh Shay DO      Referring Provider: No ref. provider found        Chief Complaint  Eosinophilic Esophagitis (Follow up )    History of Present Illness  Pt initially presented 4/8/24 with dysphagia and worsening heartburn on Nexium.  History of throat / tonsillar cancer in 2015, had radiation and chemo treatments.  History of  EOE in 2018     EGD 7/2018 by Dr. Woods: Small size hiatal hernia, esophagitis possibly eosinophilic-eosinophilic esophagitis noted, 11 mm polyp in the stomach body removed-fundic gland polyp  EGD/colonoscopy May 2018 by Dr. Woods: Good prep, hiatal hernia, Schatzki's ring with dilation performed from 15 to 18 mm, biopsy showed increased eosinophils up to 35 per high-power field, 4 small polyps in the stomach removed-fundic gland, normal duodenum, diverticula noted in the sigmoid otherwise negative     Repeat  EGD 5/29/2024: Medium size hiatal hernia, irregular Z-line-biopsy with increased eosinophils up to 56 per high-power field, also reflux esophagitis noted, 4 polyps in the stomach with biopsy taken, size 12 mm--polyps are fundic gland/benign normal duodenum  Protonix prescribed    Patient last seen 11/6/2024, reported wakes up with burning in throat and takes Nexium he did not want to try Protonix as it did not work in the past and neither did Prilosec.  He reported a few issues with dysphagia.  Voquezna was attempted but insurance did not cover, Dupixent was prescribed for EOE.  Also Dexilant was tried but this was not covered    History of Present Illness  The patient presents for f/u of eosinophilic esophagitis and hiatal hernia.    He reports no episodes of dysphagia, heartburn,  or reflux since his last visit. He has been managing his condition with Dupixent and Nexium, which have resulted in an improvement in his swallowing. He has not experienced any choking episodes since starting Dupixent.     His heartburn symptoms are well-controlled with Nexium, and he has not experienced any episodes of acid reflux. He typically consumes dinner between 6:00 PM and 7:00 PM and retires to bed around midnight. He has adjusted his Nexium intake from early morning to mid-late morning, always ensuring it is taken before a meal. He does not require a refill of Nexium at this time. He reports no changes in bowel habits, presence of blood in stool, or black tarry stools. He also reports no abdominal pain associated with eating.          Past Medical History:   Diagnosis Date    Allergies     Ankle pain, right     Esophageal reflux     Foot pain, right     HLD (hyperlipidemia)     HTN (hypertension)     Hypothyroid     CHERRY (obstructive sleep apnea) 02/14/2019    Throat cancer     Tonsil cancer     SEE DR ELY WILKS AT [facility]. DR MCKINLEY RIGGS AND DR RAHUL GUEVARA FOR CANCER REHAB SQUAMOUS CELL CARCINOMA       Past Surgical History:   Procedure Laterality Date    APPENDECTOMY      COLONOSCOPY  05/02/2018    ENDOSCOPY N/A 5/29/2024    Procedure: ESOPHAGOGASTRODUODENOSCOPY WITH BIOPSIES;  Surgeon: Ryan Woods MD;  Location: [facility] ENDOSCOPY;  Service: Gastroenterology;  Laterality: N/A;  GASTRIC POLYP/HIATAL HERNIA    GASTROSTOMY FEEDING TUBE INSERTION      KNEE SURGERY      NOSE SURGERY      CARTLIAGE GRAFT AUTOGENOUS, TO NOSE FROM RIB    PORTACATH PLACEMENT      SKIN CANCER EXCISION      LEFT EAR    UPPER GASTROINTESTINAL ENDOSCOPY         No Known Allergies    Family History   Problem Relation Age of Onset    Stroke Other     Alzheimer's disease Other     Heart disease Other     Diabetes Other     Colon cancer Neg Hx         Social History     Tobacco Use    Smoking status: Former      "Current packs/day: 0.00     Average packs/day: 2.0 packs/day for 5.0 years (10.0 ttl pk-yrs)     Types: Cigarettes     Start date:      Quit date:      Years since quittin.3     Passive exposure: Past    Smokeless tobacco: Never    Tobacco comments:     AGE [age]   Vaping Use    Vaping status: Never Used   Substance Use Topics    Alcohol use: Not Currently     Comment: RARE SOCIALLY    Drug use: Defer       Objective     Vital Signs:   /66 (BP Location: Right arm, Patient Position: Sitting, Cuff Size: Adult)   Pulse 70   Ht 175.3 cm (69\")   Wt 99.6 kg (219 lb 9.6 oz)   BMI 32.43 kg/m²       Physical Exam  Constitutional:       General: The patient is not in acute distress.     Appearance: Normal appearance.   HENT:      Head: Normocephalic and atraumatic.      Nose: Nose normal.   Pulmonary:      Effort: Pulmonary effort is normal. No respiratory distress.   Abdominal:      General: Abdomen is flat.      Palpations: Abdomen is soft. There is no mass.      Tenderness: There is no abdominal tenderness. There is no guarding.   Musculoskeletal:      Cervical back: Neck supple.      Right lower leg: No edema.      Left lower leg: No edema.   Skin:     General: Skin is warm and dry.   Neurological:      General: No focal deficit present.      Mental Status: The patient is alert and oriented to person, place, and time.      Gait: Gait normal.   Psychiatric:         Mood and Affect: Mood normal.         Speech: Speech normal.         Behavior: Behavior normal.         Thought Content: Thought content normal.     Result Review :   The following data was reviewed by: BARRERA Mohamud on 2025:    CBC w/diff          2024    08:57 2024    09:10 2/3/2025    08:56   CBC w/Diff   WBC 6.71  6.92  6.08    RBC 5.18  5.18  4.97    Hemoglobin 14.8  14.9  14.7    Hematocrit 45.6  44.5  43.0    MCV 88.0  85.9  86.5    MCH 28.6  28.8  29.6    MCHC 32.5  33.5  34.2    RDW 13.1  13.0  13.2  "   Platelets 291  265  275    Neutrophil Rel % 48.2  38.2  40.5    Immature Granulocyte Rel % 0.3  0.1  0.3    Lymphocyte Rel % 37.1  47.7  44.7    Monocyte Rel % 8.2  7.8  9.5    Eosinophil Rel % 5.2  5.3  4.3    Basophil Rel % 1.0  0.9  0.7      CMP          8/1/2024    08:57 11/8/2024    09:10 2/3/2025    08:56   CMP   Glucose 103  95  99    BUN 18  16  15    Creatinine 0.98  1.06  0.93    EGFR 85.6  77.4  90.6    Sodium 138  138  138    Potassium 4.1  3.8  3.6    Chloride 99  99  98    Calcium 9.7  10.0  9.6    Total Protein 7.5  7.7  7.2    Albumin 4.5  4.1  4.3    Globulin 3.0  3.6  2.9    Total Bilirubin 0.4  0.5  0.5    Alkaline Phosphatase 62  72  62    AST (SGOT) 24  18  30    ALT (SGPT) 16  10  19    Albumin/Globulin Ratio 1.5  1.1  1.5    BUN/Creatinine Ratio 18.4  15.1  16.1    Anion Gap 14.3  10.0  12.0                    Assessment and Plan    Diagnoses and all orders for this visit:    1. Heartburn (Primary)  Comments:  stable with nexium    2. Esophagitis, eosinophilic    3. Esophageal dysphagia  Comments:  resolved    4. Hiatal hernia          Assessment & Plan  1. Eosinophilic esophagitis:  - Continue Dupixent injections.  - No episodes of dysphagia since starting Dupixent.    2. Hiatal hernia:  - Continue Nexium before meals.  - Maintain a minimum 3-hour gap between dinner and bedtime to minimize nighttime episodes.    Follow-up:  - 05/2026 or sooner if needed.  --Call or return to clinic PRN if any change in bowel pattern, blood in stool, or new GI sx.             Follow Up   Return in about 1 year (around 5/7/2026).    Patient or patient representative verbalized consent for the use of Ambient Listening during the visit with  BARRERA Mohamud for chart documentation. 5/7/2025  08:54 EDT    Patient was given instructions and counseling regarding his condition or for health maintenance advice. Please see specific information pulled into the AVS if appropriate.

## 2025-05-07 ENCOUNTER — OFFICE VISIT (OUTPATIENT)
Dept: GASTROENTEROLOGY | Facility: CLINIC | Age: 65
End: 2025-05-07
Payer: MEDICARE

## 2025-05-07 VITALS
HEART RATE: 70 BPM | BODY MASS INDEX: 32.53 KG/M2 | DIASTOLIC BLOOD PRESSURE: 66 MMHG | WEIGHT: 219.6 LBS | HEIGHT: 69 IN | SYSTOLIC BLOOD PRESSURE: 163 MMHG

## 2025-05-07 DIAGNOSIS — K44.9 HIATAL HERNIA: ICD-10-CM

## 2025-05-07 DIAGNOSIS — R12 HEARTBURN: Primary | ICD-10-CM

## 2025-05-07 DIAGNOSIS — K20.0 ESOPHAGITIS, EOSINOPHILIC: ICD-10-CM

## 2025-05-07 DIAGNOSIS — R13.19 ESOPHAGEAL DYSPHAGIA: ICD-10-CM

## 2025-05-07 RX ORDER — ESOMEPRAZOLE MAGNESIUM 40 MG/1
CAPSULE, DELAYED RELEASE ORAL
COMMUNITY
Start: 2025-03-28

## 2025-06-04 ENCOUNTER — OFFICE VISIT (OUTPATIENT)
Dept: CARDIOLOGY | Facility: CLINIC | Age: 65
End: 2025-06-04
Payer: MEDICARE

## 2025-06-04 VITALS
DIASTOLIC BLOOD PRESSURE: 87 MMHG | BODY MASS INDEX: 31.22 KG/M2 | WEIGHT: 210.8 LBS | HEART RATE: 64 BPM | SYSTOLIC BLOOD PRESSURE: 158 MMHG | HEIGHT: 69 IN

## 2025-06-04 DIAGNOSIS — E78.5 HYPERLIPIDEMIA LDL GOAL <70: ICD-10-CM

## 2025-06-04 DIAGNOSIS — I10 PRIMARY HYPERTENSION: ICD-10-CM

## 2025-06-04 DIAGNOSIS — I25.10 CORONARY ARTERY DISEASE INVOLVING NATIVE CORONARY ARTERY OF NATIVE HEART WITHOUT ANGINA PECTORIS: Primary | ICD-10-CM

## 2025-06-04 PROCEDURE — 1159F MED LIST DOCD IN RCRD: CPT

## 2025-06-04 PROCEDURE — 99214 OFFICE O/P EST MOD 30 MIN: CPT

## 2025-06-04 PROCEDURE — 3077F SYST BP >= 140 MM HG: CPT

## 2025-06-04 PROCEDURE — 3079F DIAST BP 80-89 MM HG: CPT

## 2025-06-04 PROCEDURE — 1160F RVW MEDS BY RX/DR IN RCRD: CPT

## 2025-06-04 NOTE — PROGRESS NOTES
Chief Complaint  Follow-up (6 month/), Coronary Artery Disease, Hypertension, and Hyperlipidemia    Subjective        History of Present Illness  Rico Armstrong presents to [facility]ARD[facility] for follow up.   Patient is a 64-year-old male with past medical history outlined below, significant for hypertension, hyperlipidemia and coronary artery disease who presents for routine follow-up.  He is doing well from a cardiac standpoint.  He has no chest pain or discomfort, dyspnea, palpitations, edema or syncope.    Past Medical History:   Diagnosis Date    Allergies     Ankle pain, right     Esophageal reflux     Foot pain, right     HLD (hyperlipidemia)     HTN (hypertension)     Hypothyroid     CHERRY (obstructive sleep apnea) 2019    Throat cancer     Tonsil cancer     SEE DR ELY WILKS AT [facility]. DR MCKINLEY RIGGS AND DR RAHUL GUEVARA FOR CANCER REHAB SQUAMOUS CELL CARCINOMA       ALLERGY  No Known Allergies     Past Surgical History:   Procedure Laterality Date    APPENDECTOMY      COLONOSCOPY  2018    ENDOSCOPY N/A 2024    Procedure: ESOPHAGOGASTRODUODENOSCOPY WITH BIOPSIES;  Surgeon: Ryan Woods MD;  Location: [facility] ENDOSCOPY;  Service: Gastroenterology;  Laterality: N/A;  GASTRIC POLYP/HIATAL HERNIA    GASTROSTOMY FEEDING TUBE INSERTION      KNEE SURGERY      NOSE SURGERY      CARTLIAGE GRAFT AUTOGENOUS, TO NOSE FROM RIB    PORTACATH PLACEMENT      SKIN CANCER EXCISION      LEFT EAR    UPPER GASTROINTESTINAL ENDOSCOPY          Social History     Socioeconomic History    Marital status:    Tobacco Use    Smoking status: Former     Current packs/day: 0.00     Average packs/day: 2.0 packs/day for 5.0 years (10.0 ttl pk-yrs)     Types: Cigarettes     Start date:      Quit date:      Years since quittin.4     Passive exposure: Past    Smokeless tobacco: Never    Tobacco comments:     AGE [age]   Vaping Use    Vaping status: Never Used  "  Substance and Sexual Activity    Alcohol use: Not Currently     Comment: RARE SOCIALLY    Drug use: Defer    Sexual activity: Defer       Family History   Problem Relation Age of Onset    Stroke Other     Alzheimer's disease Other     Heart disease Other     Diabetes Other     Colon cancer Neg Hx         Current Outpatient Medications on File Prior to Visit   Medication Sig    Aspirin Low Dose 81 MG EC tablet     cetirizine (zyrTEC) 10 MG tablet     cholecalciferol (VITAMIN D3) 25 MCG (1000 UT) tablet     Dupilumab (Dupixent) 300 MG/2ML solution prefilled syringe Inject 2 mL under the skin into the appropriate area as directed 1 (One) Time Per Week.    esomeprazole (nexIUM) 40 MG capsule     Evolocumab (REPATHA) solution auto-injector SureClick injection Inject 1 mL under the skin into the appropriate area as directed Every 14 (Fourteen) Days.    fluticasone (Flonase) 50 MCG/ACT nasal spray 2 sprays into the nostril(s) as directed by provider Daily.    hydroCHLOROthiazide (HYDRODIURIL) 25 MG tablet     levothyroxine (Synthroid) 88 MCG tablet Take 1 tablet by mouth Daily. (Patient taking differently: Take 100 mcg by mouth Daily.)    losartan (COZAAR) 25 MG tablet Take 1 tablet by mouth Daily.    multivitamin (THERAGRAN) tablet tablet Take  by mouth Daily.    Omega-3 Fatty Acids (fish oil) 1000 MG capsule capsule Take  by mouth Daily.    Sod Fluoride-Potassium Nitrate (PreviDent 5000 Sensitive) 1.1-5 % paste Prevident 5000 Enamel Protect 1.1-5 % dental paste Brush before bed and do not rinse   Active     No current facility-administered medications on file prior to visit.       Objective   Vitals:    06/04/25 0822   BP: 158/87   BP Location: Left arm   Patient Position: Sitting   Cuff Size: Adult   Pulse: 64   Weight: 95.6 kg (210 lb 12.8 oz)   Height: 175.3 cm (69.02\")       Physical Exam  Constitutional:       General: He is awake. He is not in acute distress.     Appearance: Normal appearance.   HENT:      Head: " Normocephalic.      Nose: Nose normal. No congestion.   Eyes:      Extraocular Movements: Extraocular movements intact.      Conjunctiva/sclera: Conjunctivae normal.      Pupils: Pupils are equal, round, and reactive to light.   Neck:      Thyroid: No thyromegaly.      Vascular: No JVD.   Cardiovascular:      Rate and Rhythm: Normal rate and regular rhythm.      Chest Wall: PMI is not displaced.      Pulses: Normal pulses.      Heart sounds: Normal heart sounds, S1 normal and S2 normal. No murmur heard.     No friction rub. No gallop. No S3 or S4 sounds.   Pulmonary:      Effort: Pulmonary effort is normal.      Breath sounds: Normal breath sounds. No wheezing, rhonchi or rales.   Abdominal:      General: Bowel sounds are normal.      Palpations: Abdomen is soft.      Tenderness: There is no abdominal tenderness.   Musculoskeletal:      Cervical back: No tenderness.      Right lower leg: No edema.      Left lower leg: No edema.   Lymphadenopathy:      Cervical: No cervical adenopathy.   Skin:     General: Skin is warm and dry.      Capillary Refill: Capillary refill takes less than 2 seconds.      Coloration: Skin is not cyanotic.      Findings: No petechiae or rash.      Nails: There is no clubbing.   Neurological:      Mental Status: He is alert.   Psychiatric:         Mood and Affect: Mood normal.         Behavior: Behavior is cooperative.           Result Review     The following data was reviewed by BARRERA Gallardo on 06/04/25.      CMP          8/1/2024    08:57 11/8/2024    09:10 2/3/2025    08:56   CMP   Glucose 103  95  99    BUN 18  16  15    Creatinine 0.98  1.06  0.93    EGFR 85.6  77.4  90.6    Sodium 138  138  138    Potassium 4.1  3.8  3.6    Chloride 99  99  98    Calcium 9.7  10.0  9.6    Total Protein 7.5  7.7  7.2    Albumin 4.5  4.1  4.3    Globulin 3.0  3.6  2.9    Total Bilirubin 0.4  0.5  0.5    Alkaline Phosphatase 62  72  62    AST (SGOT) 24  18  30    ALT (SGPT) 16  10  19     Albumin/Globulin Ratio 1.5  1.1  1.5    BUN/Creatinine Ratio 18.4  15.1  16.1    Anion Gap 14.3  10.0  12.0      CBC w/diff          8/1/2024    08:57 11/8/2024    09:10 2/3/2025    08:56   CBC w/Diff   WBC 6.71  6.92  6.08    RBC 5.18  5.18  4.97    Hemoglobin 14.8  14.9  14.7    Hematocrit 45.6  44.5  43.0    MCV 88.0  85.9  86.5    MCH 28.6  28.8  29.6    MCHC 32.5  33.5  34.2    RDW 13.1  13.0  13.2    Platelets 291  265  275    Neutrophil Rel % 48.2  38.2  40.5    Immature Granulocyte Rel % 0.3  0.1  0.3    Lymphocyte Rel % 37.1  47.7  44.7    Monocyte Rel % 8.2  7.8  9.5    Eosinophil Rel % 5.2  5.3  4.3    Basophil Rel % 1.0  0.9  0.7       Lipid Panel          8/1/2024    08:57 11/8/2024    09:10 2/3/2025    08:56   Lipid Panel   Total Cholesterol 211  187  127    Triglycerides 167  154  111    HDL Cholesterol 50  47  50    VLDL Cholesterol 30  27  20    LDL Cholesterol  131  113  57    LDL/HDL Ratio 2.55  2.32  1.10        Results for orders placed during the hospital encounter of 09/12/24    Adult Transthoracic Echo Complete W/ Cont if Necessary Per Protocol    Interpretation Summary  The quality of the study is suboptimal.    Grossly normal chamber sizes.  LV has normal wall thickness.  No regional wall motion abnormalities are noted calculated LVEF is greater than 55%. Mildly abnormal diastolic function with normal LAP.  Valves are not well-visualized.  Probably trileaflet aortic valve.  There is no aortic valve stenosis visually  There is no significant TR noted.  RVSP therefore cannot be estimated.  No pericardial effusion is present.  IVC has normal size.    No prior studies available for comparison.    Results for orders placed during the hospital encounter of 12/31/24    Stress Test With Myocardial Perfusion One Day    Interpretation Summary    Low risk for ischemic heart disease.    Low risk Mansoor protocol exercise treadmill testing with myocardial perfusion imaging  Patient exercised for 7  minutes and 30 seconds equivalent to 7 METS, achieved target heart rate without angina.  Stress test was terminated due to hypertensive response to stress.  Hypertensive hemodynamic response is noted, hypertension present at baseline.  Adequate heart rate recovery is noted  There were no reversible perfusion defect noted.  No evidence of ischemia is noted.  No transient ischemic dilatation is noted.  No wall motion maladies are noted.  Stress LVEF is greater than 60%.  At peak stress, no EKG changes were noted concerning for ischemia.  PVCs were noted during stress and recovery phase.  Estimated Muller treadmill score is +7 which puts patient at low risk of cardiovascular events for 1 year.    No results found for this or any previous visit.          Procedures      Assessment & Plan  Coronary artery disease involving native coronary artery of native heart without angina pectoris  Recent CT calcium score with a total calcium score of 107.  Continue risk factor modification.  He is asymptomatic.  Hyperlipidemia LDL goal <70  LDL is at goal.  Continue Repatha.  Primary hypertension  Blood pressure is elevated in the office today but he reports good blood pressure control at home.  Continue current antihypertensive regimen.                     Follow Up   Return in about 1 year (around 6/4/2026) for With Dr. Busby.    Patient was given instructions and counseling regarding his condition or for health maintenance advice. Please see specific information pulled into the AVS if appropriate.     BARRERA Gallardo  06/04/25  09:08 EDT    Dictated Utilizing Dragon Dictation

## 2025-06-04 NOTE — ASSESSMENT & PLAN NOTE
Blood pressure is elevated in the office today but he reports good blood pressure control at home.  Continue current antihypertensive regimen.

## 2025-06-04 NOTE — ASSESSMENT & PLAN NOTE
Recent CT calcium score with a total calcium score of 107.  Continue risk factor modification.  He is asymptomatic.

## 2025-07-23 ENCOUNTER — OFFICE VISIT (OUTPATIENT)
Dept: FAMILY MEDICINE CLINIC | Facility: CLINIC | Age: 65
End: 2025-07-23
Payer: MEDICARE

## 2025-07-23 VITALS
HEART RATE: 65 BPM | WEIGHT: 203 LBS | SYSTOLIC BLOOD PRESSURE: 142 MMHG | BODY MASS INDEX: 30.07 KG/M2 | TEMPERATURE: 97.7 F | OXYGEN SATURATION: 94 % | DIASTOLIC BLOOD PRESSURE: 70 MMHG | HEIGHT: 69 IN

## 2025-07-23 DIAGNOSIS — H53.19 PHOTOPSIA OF LEFT EYE: Primary | ICD-10-CM

## 2025-07-23 PROCEDURE — 3078F DIAST BP <80 MM HG: CPT | Performed by: STUDENT IN AN ORGANIZED HEALTH CARE EDUCATION/TRAINING PROGRAM

## 2025-07-23 PROCEDURE — 1125F AMNT PAIN NOTED PAIN PRSNT: CPT | Performed by: STUDENT IN AN ORGANIZED HEALTH CARE EDUCATION/TRAINING PROGRAM

## 2025-07-23 PROCEDURE — 3077F SYST BP >= 140 MM HG: CPT | Performed by: STUDENT IN AN ORGANIZED HEALTH CARE EDUCATION/TRAINING PROGRAM

## 2025-07-23 PROCEDURE — 99214 OFFICE O/P EST MOD 30 MIN: CPT | Performed by: STUDENT IN AN ORGANIZED HEALTH CARE EDUCATION/TRAINING PROGRAM

## 2025-07-23 NOTE — PROGRESS NOTES
"Chief Complaint  Eye Problem (Left eye floater x 1 week)    Subjective      Rico Armstrong is a 64 y.o. male who presents to [facility]     History of Present Illness  The patient presents with vision changes and reports symptoms that have persisted for the past week.    Vision Changes  - A year ago, the patient experienced a floater in the left eye that resolved without intervention.  - For the past week, he has noticed yellow flashes in the peripheral vision of the left eye, particularly in low light conditions when ascending stairs or turning his head.  - The flashes are constant, visible in the dark but not during the day, and occur with eye or neck movement.  - No associated blurriness, pain, headaches, or changes in vision in the right eye.  - No dark curtain or floaters reported.  - Straight-ahead vision is normal.  - Symptoms began acutely and have persisted without change.    Additional Information  - Last eye exam was a year ago for glasses prescription.  - Tried Visine for irritation without relief.  - Past surgical history includes a cancer diagnosis 10 years ago and ENT specialist consultation.       Objective   Vital Signs:   Vitals:    07/23/25 0729   BP: 142/70   BP Location: Left arm   Patient Position: Sitting   Pulse: 65   Temp: 97.7 °F (36.5 °C)   TempSrc: Oral   SpO2: 94%   Weight: 92.1 kg (203 lb)   Height: 175.3 cm (69.02\")     Body mass index is 29.96 kg/m².    Wt Readings from Last 3 Encounters:   07/23/25 92.1 kg (203 lb)   06/04/25 95.6 kg (210 lb 12.8 oz)   05/19/25 93 kg (205 lb)     BP Readings from Last 3 Encounters:   07/23/25 142/70   06/04/25 158/87   05/19/25 143/72       Health Maintenance   Topic Date Due    COVID-19 Vaccine (6 - 2024-25 season) 09/01/2024    INFLUENZA VACCINE  10/01/2025    ANNUAL WELLNESS VISIT  11/08/2025    LIPID PANEL  02/03/2026    COLORECTAL CANCER SCREENING  05/02/2028    TDAP/TD VACCINES (4 - Td or Tdap) 12/13/2033 "    HEPATITIS C SCREENING  Completed    Pneumococcal Vaccine 50+  Completed    AAA SCREEN ONCE  Completed    ZOSTER VACCINE  Completed       Physical Exam  HENT:      Head: Normocephalic and atraumatic.      Nose: Nose normal.      Mouth/Throat:      Mouth: Mucous membranes are moist.   Eyes:      Extraocular Movements: Extraocular movements intact.      Conjunctiva/sclera: Conjunctivae normal.      Pupils: Pupils are equal, round, and reactive to light.      Comments: Peripheral vision intact   Pulmonary:      Effort: No respiratory distress.   Abdominal:      General: Abdomen is flat. There is no distension.   Musculoskeletal:         General: No swelling.      Cervical back: Neck supple.   Skin:     General: Skin is warm and dry.   Neurological:      General: No focal deficit present.      Mental Status: He is alert and oriented to person, place, and time.   Psychiatric:         Mood and Affect: Mood normal.         Behavior: Behavior normal.         Thought Content: Thought content normal.         Judgment: Judgment normal.          Physical Exam      Result Review :  The following data was reviewed by: Crescencio Dolan DO on 07/23/2025:    No Images in the past 120 days found..     Results         Procedures          Diagnoses and all orders for this visit:    1. Photopsia of left eye (Primary)  -     Ambulatory Referral to Ophthalmology         Assessment & Plan  1. Photopsia.  - Yellow flashes in peripheral vision of the left eye for the past week, noticeable in low light and with head movement.  - No pain, blurred vision, or dark curtain over vision. Pupils equal and reactive to light, normal pupillary light reflex.  - Possible retinal issues, including early retinal detachment. Referral to ophthalmologist for further evaluation.  - Ddx: optic neuritis, retinal detachment, amaurosis fugax  - Called [facility] personally and discussed María torres scheduled patient for an appointment today with  Dr. De Los Santos.  They will reach out to the patient and call for an appointment.            I spent 31 minutes caring for Rico on this date of service. This time includes time spent by me in the following activities:preparing for the visit, reviewing tests, performing a medically appropriate examination and/or evaluation , counseling and educating the patient/family/caregiver, referring and communicating with other health care professionals , documenting information in the medical record, and care coordination  FOLLOW UP  Return for Next scheduled follow up.  Patient was given instructions and counseling regarding his condition or for health maintenance advice. Please see specific information pulled into the AVS if appropriate.     Patient or patient representative verbalized consent for the use of Ambient Listening during the visit with  Crescencio Dolan DO for chart documentation. 7/23/2025  08:11 EDT    Crescencio Dolan DO  07/23/25  08:15 EDT    CURRENT & DISCONTINUED MEDICATIONS  Current Outpatient Medications   Medication Instructions    Aspirin Low Dose 81 MG EC tablet No dose, route, or frequency recorded.    cetirizine (zyrTEC) 10 MG tablet No dose, route, or frequency recorded.    cholecalciferol (VITAMIN D3) 25 MCG (1000 UT) tablet No dose, route, or frequency recorded.    Dupixent 300 mg, Subcutaneous, Weekly    esomeprazole (nexIUM) 40 MG capsule     Evolocumab (REPATHA) 140 mg, Subcutaneous, Every 14 Days    fluticasone (Flonase) 50 MCG/ACT nasal spray 2 sprays, Nasal, Daily    hydroCHLOROthiazide (HYDRODIURIL) 25 MG tablet No dose, route, or frequency recorded.    levothyroxine (SYNTHROID) 88 mcg, Oral, Daily    losartan (COZAAR) 25 mg, Daily    multivitamin (THERAGRAN) tablet tablet Daily    Omega-3 Fatty Acids (fish oil) 1000 MG capsule capsule Daily    Sod Fluoride-Potassium Nitrate (PreviDent 5000 Sensitive) 1.1-5 % paste Prevident 5000 Enamel Protect 1.1-5 % dental paste Brush before bed and do not  rinse   Active       There are no discontinued medications.

## 2025-08-11 ENCOUNTER — TELEPHONE (OUTPATIENT)
Dept: GASTROENTEROLOGY | Facility: CLINIC | Age: 65
End: 2025-08-11
Payer: MEDICARE

## 2025-08-13 RX ORDER — DUPILUMAB 300 MG/2ML
300 INJECTION, SOLUTION SUBCUTANEOUS WEEKLY
Qty: 4 EACH | Refills: 5 | Status: SHIPPED | OUTPATIENT
Start: 2025-08-13

## (undated) DEVICE — CONN JET HYDRA H20 AUXILIARY DISP

## (undated) DEVICE — Device: Brand: DEFENDO AIR/WATER/SUCTION AND BIOPSY VALVE

## (undated) DEVICE — BLCK/BITE BLOX WO/DENTL/RIM W/STRAP 54F

## (undated) DEVICE — SOLIDIFIER LIQLOC PLS 1500CC BT

## (undated) DEVICE — Device

## (undated) DEVICE — SOL IRRG H2O PL/BG 1000ML STRL

## (undated) DEVICE — LINER SURG CANSTR SXN S/RIGD 1500CC

## (undated) DEVICE — SINGLE-USE BIOPSY FORCEPS: Brand: RADIAL JAW 4